# Patient Record
Sex: FEMALE | Race: ASIAN | NOT HISPANIC OR LATINO | URBAN - METROPOLITAN AREA
[De-identification: names, ages, dates, MRNs, and addresses within clinical notes are randomized per-mention and may not be internally consistent; named-entity substitution may affect disease eponyms.]

---

## 2019-02-20 ENCOUNTER — INPATIENT (INPATIENT)
Facility: HOSPITAL | Age: 79
LOS: 1 days | Discharge: HOME | DRG: 871 | End: 2019-02-22
Attending: HOSPITALIST | Admitting: HOSPITALIST
Payer: MEDICARE

## 2019-02-20 VITALS
HEART RATE: 124 BPM | SYSTOLIC BLOOD PRESSURE: 186 MMHG | WEIGHT: 119.93 LBS | RESPIRATION RATE: 16 BRPM | HEIGHT: 61 IN | OXYGEN SATURATION: 98 % | TEMPERATURE: 101 F | DIASTOLIC BLOOD PRESSURE: 96 MMHG

## 2019-02-20 DIAGNOSIS — J11.1 INFLUENZA DUE TO UNIDENTIFIED INFLUENZA VIRUS WITH OTHER RESPIRATORY MANIFESTATIONS: ICD-10-CM

## 2019-02-20 LAB
ALBUMIN SERPL ELPH-MCNC: 3.4 G/DL — LOW (ref 3.5–5)
ALP SERPL-CCNC: 107 U/L — SIGNIFICANT CHANGE UP (ref 40–120)
ALT FLD-CCNC: 26 U/L DA — SIGNIFICANT CHANGE UP (ref 10–60)
ANION GAP SERPL CALC-SCNC: 7 MMOL/L — SIGNIFICANT CHANGE UP (ref 5–17)
APTT BLD: 42.4 SEC — HIGH (ref 27.5–36.3)
AST SERPL-CCNC: 43 U/L — HIGH (ref 10–40)
BASOPHILS # BLD AUTO: 0.02 K/UL — SIGNIFICANT CHANGE UP (ref 0–0.2)
BASOPHILS NFR BLD AUTO: 0.4 % — SIGNIFICANT CHANGE UP (ref 0–2)
BILIRUB SERPL-MCNC: 0.7 MG/DL — SIGNIFICANT CHANGE UP (ref 0.2–1.2)
BUN SERPL-MCNC: 9 MG/DL — SIGNIFICANT CHANGE UP (ref 7–18)
CALCIUM SERPL-MCNC: 8.4 MG/DL — SIGNIFICANT CHANGE UP (ref 8.4–10.5)
CHLORIDE SERPL-SCNC: 98 MMOL/L — SIGNIFICANT CHANGE UP (ref 96–108)
CO2 SERPL-SCNC: 31 MMOL/L — SIGNIFICANT CHANGE UP (ref 22–31)
CREAT SERPL-MCNC: 0.56 MG/DL — SIGNIFICANT CHANGE UP (ref 0.5–1.3)
EOSINOPHIL # BLD AUTO: 0.01 K/UL — SIGNIFICANT CHANGE UP (ref 0–0.5)
EOSINOPHIL NFR BLD AUTO: 0.2 % — SIGNIFICANT CHANGE UP (ref 0–6)
FLU A RESULT: DETECTED
FLU A RESULT: DETECTED
FLUAV AG NPH QL: DETECTED
FLUBV AG NPH QL: SIGNIFICANT CHANGE UP
GLUCOSE SERPL-MCNC: 98 MG/DL — SIGNIFICANT CHANGE UP (ref 70–99)
HCT VFR BLD CALC: 44.8 % — SIGNIFICANT CHANGE UP (ref 34.5–45)
HGB BLD-MCNC: 14 G/DL — SIGNIFICANT CHANGE UP (ref 11.5–15.5)
IMM GRANULOCYTES NFR BLD AUTO: 0.2 % — SIGNIFICANT CHANGE UP (ref 0–1.5)
INR BLD: 1.25 RATIO — HIGH (ref 0.88–1.16)
LACTATE SERPL-SCNC: 0.9 MMOL/L — SIGNIFICANT CHANGE UP (ref 0.7–2)
LYMPHOCYTES # BLD AUTO: 0.41 K/UL — LOW (ref 1–3.3)
LYMPHOCYTES # BLD AUTO: 7.8 % — LOW (ref 13–44)
MCHC RBC-ENTMCNC: 31.3 GM/DL — LOW (ref 32–36)
MCHC RBC-ENTMCNC: 32.3 PG — SIGNIFICANT CHANGE UP (ref 27–34)
MCV RBC AUTO: 103.5 FL — HIGH (ref 80–100)
MONOCYTES # BLD AUTO: 0.69 K/UL — SIGNIFICANT CHANGE UP (ref 0–0.9)
MONOCYTES NFR BLD AUTO: 13.1 % — SIGNIFICANT CHANGE UP (ref 2–14)
NEUTROPHILS # BLD AUTO: 4.13 K/UL — SIGNIFICANT CHANGE UP (ref 1.8–7.4)
NEUTROPHILS NFR BLD AUTO: 78.3 % — HIGH (ref 43–77)
NRBC # BLD: 0 /100 WBCS — SIGNIFICANT CHANGE UP (ref 0–0)
PLATELET # BLD AUTO: 120 K/UL — LOW (ref 150–400)
POTASSIUM SERPL-MCNC: 4.1 MMOL/L — SIGNIFICANT CHANGE UP (ref 3.5–5.3)
POTASSIUM SERPL-SCNC: 4.1 MMOL/L — SIGNIFICANT CHANGE UP (ref 3.5–5.3)
PROT SERPL-MCNC: 7.6 G/DL — SIGNIFICANT CHANGE UP (ref 6–8.3)
PROTHROM AB SERPL-ACNC: 14 SEC — HIGH (ref 10–12.9)
RBC # BLD: 4.33 M/UL — SIGNIFICANT CHANGE UP (ref 3.8–5.2)
RBC # FLD: 12.6 % — SIGNIFICANT CHANGE UP (ref 10.3–14.5)
RSV RESULT: DETECTED
RSV RNA RESP QL NAA+PROBE: DETECTED
SODIUM SERPL-SCNC: 136 MMOL/L — SIGNIFICANT CHANGE UP (ref 135–145)
WBC # BLD: 5.27 K/UL — SIGNIFICANT CHANGE UP (ref 3.8–10.5)
WBC # FLD AUTO: 5.27 K/UL — SIGNIFICANT CHANGE UP (ref 3.8–10.5)

## 2019-02-20 PROCEDURE — 71045 X-RAY EXAM CHEST 1 VIEW: CPT | Mod: 26

## 2019-02-20 PROCEDURE — 99284 EMERGENCY DEPT VISIT MOD MDM: CPT | Mod: 25

## 2019-02-20 RX ORDER — AZITHROMYCIN 500 MG/1
500 TABLET, FILM COATED ORAL ONCE
Qty: 0 | Refills: 0 | Status: COMPLETED | OUTPATIENT
Start: 2019-02-20 | End: 2019-02-20

## 2019-02-20 RX ORDER — ACETAMINOPHEN 500 MG
650 TABLET ORAL ONCE
Qty: 0 | Refills: 0 | Status: COMPLETED | OUTPATIENT
Start: 2019-02-20 | End: 2019-02-20

## 2019-02-20 RX ORDER — CEFTRIAXONE 500 MG/1
1 INJECTION, POWDER, FOR SOLUTION INTRAMUSCULAR; INTRAVENOUS ONCE
Qty: 0 | Refills: 0 | Status: COMPLETED | OUTPATIENT
Start: 2019-02-20 | End: 2019-02-20

## 2019-02-20 RX ORDER — SODIUM CHLORIDE 9 MG/ML
1700 INJECTION INTRAMUSCULAR; INTRAVENOUS; SUBCUTANEOUS ONCE
Qty: 0 | Refills: 0 | Status: COMPLETED | OUTPATIENT
Start: 2019-02-20 | End: 2019-02-20

## 2019-02-20 RX ADMIN — Medication 650 MILLIGRAM(S): at 22:42

## 2019-02-20 RX ADMIN — SODIUM CHLORIDE 1700 MILLILITER(S): 9 INJECTION INTRAMUSCULAR; INTRAVENOUS; SUBCUTANEOUS at 22:23

## 2019-02-20 RX ADMIN — Medication 650 MILLIGRAM(S): at 23:30

## 2019-02-20 NOTE — H&P ADULT - PROBLEM SELECTOR PLAN 3
c/w duoneb, solumedorol q8 hr p/w flu like symtoms body ache, sorethroat cough, runny nose with sick contacts  Labs positive for Flu A infection   started tamifly

## 2019-02-20 NOTE — ED PROVIDER NOTE - PROGRESS NOTE DETAILS
pt febrile, tachycardic.  CXR with questionable right sided consolidation.  pt flu positive. will admit.

## 2019-02-20 NOTE — ED PROVIDER NOTE - OBJECTIVE STATEMENT
79 y/o F with a PMHx of HTN, Asthma and no PSHx presents to ED c/o fever x today & cough x 2 days. Pt febrile at 101.4 & tachy at 124. NKDA

## 2019-02-20 NOTE — H&P ADULT - PROBLEM SELECTOR PLAN 2
p/w flu like symtoms body ache, sorethroat cough, runny nose with sick contacts  Labs positive for Flu A infection   started tamifly p/w fever, dyspnea, tachycardia, temp: 103F, Flu positive likely sepsis secondary to influenza and UTI  s/p rocephin and zithromax  will continue rocephin  F/u blood cultures, urine culture

## 2019-02-20 NOTE — H&P ADULT - ASSESSMENT
79 y/o F with a PMHx of HTN, Asthma ( never intubated) and no PSHx presents to ED c/o shortness of breath  x 1 day

## 2019-02-20 NOTE — H&P ADULT - PROBLEM SELECTOR PLAN 1
p/w fever, dypnea, tachycardia, temp: 103F, Flu positive likely sepsis secondary to influenza and UTI  s/p rocephin and zithromax  will continue rocephin  F p/w fever, dyspnea, tachycardia, temp: 103F, Flu positive likely sepsis secondary to influenza and UTI  s/p rocephin and zithromax  will continue rocephin  F/u blood cultures, urine culture p/w fever, dyspnea, tachycardia, temp: 103F,   RLL infiltrate  c/w RLL CAP  Flu positive likely sepsis secondary to CAP, influenza and suspected UTI  s/p Rocephin and Zithromax

## 2019-02-20 NOTE — H&P ADULT - PROBLEM SELECTOR PLAN 4
p/w symptoms of UTI, burning, increased frequency   F/u rocephin  F/u UA not sent c/w Duoneb, solumedrol q8 hr

## 2019-02-20 NOTE — H&P ADULT - NSHPPHYSICALEXAM_GEN_ALL_CORE
ICU Vital Signs Last 24 Hrs  T(C): 38.6 (20 Feb 2019 20:34), Max: 38.6 (20 Feb 2019 20:34)  T(F): 101.4 (20 Feb 2019 20:34), Max: 101.4 (20 Feb 2019 20:34)  HR: 124 (20 Feb 2019 20:34) (124 - 124)  BP: 186/96 (20 Feb 2019 20:34) (186/96 - 186/96)  BP(mean): --  ABP: --  ABP(mean): --  RR: 16 (20 Feb 2019 20:34) (16 - 16)  SpO2: 98% (20 Feb 2019 20:34) (98% - 98%)

## 2019-02-20 NOTE — H&P ADULT - ATTENDING COMMENTS
This is a 78 year old woman with PMH of HTN, Mild intermittent asthma here in the ED with 2 days of fever/SOB, cough productive of greenish sputum with URI symptoms. She presents to the ED and remains febrile in the ED with tachycardia.  ROS reveals urinary freq with dysuria.    Vital Signs Last 24 Hrs  T(C): 37.4 (21 Feb 2019 01:08), Max: 38.6 (20 Feb 2019 20:34)  T(F): 99.3 (21 Feb 2019 01:08), Max: 101.4 (20 Feb 2019 20:34)  HR: 113 (21 Feb 2019 01:08) (113 - 124)  BP: 136/107 (21 Feb 2019 01:08) (136/107 - 186/96)  RR: 16 (21 Feb 2019 01:08) (16 - 16)  SpO2: 93% (21 Feb 2019 01:08) (93% - 98%)    Elderly woman, acutely ill-looking, alert awake, oriented X 3  MMM, no JVD  Good air entry, scattered upper airway and upper lobe wheezing  RRR S1S2 only  Soft NTND BS +  No pedal edema    Labs                        14.0   5.27  )-----------( 120      ( 20 Feb 2019 21:51 )             44.8     02-20    136  |  98  |  9   ----------------------------<  98  4.1   |  31  |  0.56    Ca    8.4      20 Feb 2019 21:51    TPro  7.6  /  Alb  3.4<L>  /  TBili  0.7  /  DBili  x   /  AST  43<H>  /  ALT  26  /  AlkPhos  107  02-20    lactate -0.9    flu A +  RSV +  CXR ( prelim read by me) early infiltrate in the RLL    Impression  78 year old woman with hx as above with clinical /lab/ radiological presentation c/w a RLL CAP superimposed on a RSV/influenza A URI.    RLL CAP with sepsis  - Admit to medicine  - s/p sepsis code s/p IV hydration  - Empiric antibiotics including atypical bacteria coverage  - Supportive care - O2 / bronchodilators/steroids/  - Mucolytics as needed    Viral URI - flu A + RSV  - Supportive care  - Continue tamiflu   - Resp isolation  - Monitor closely; low threshold for ICU care if resp distress occurs    Suspected UTI   clinical presentation  UA post antibiotics admin  already on appropriate antibiotics

## 2019-02-20 NOTE — H&P ADULT - HISTORY OF PRESENT ILLNESS
79 y/o F with a PMHx of HTN, Asthma and no PSHx presents to ED c/o fever x today & cough x 2 days. Pt febrile at 101.4 & tachy at 124. NKDA 79 y/o F with a PMHx of HTN, Asthma and no PSHx presents to ED c/o shortness of breath  x 1 day    Patient states she developed sudden shortness of breath with wheezing in morning, she used her inhaler but she did not get any relief so she came to ED. Patient states  & cough x 2 days. Pt febrile at 101.4 & tachy at 124. NKDA 79 y/o F with a PMHx of HTN, Asthma and no PSHx presents to ED c/o shortness of breath  x 1 day    Patient states she developed sudden shortness of breath with wheezing in morning, she used her inhaler but she did not get any relief so she came to ED. Her dyspnea is associated with productive cough which was green in color, high grade fevers, chills, bodyache, sorethroat, runny nose since 2 days. She reports her Patient states  & cough x 2 days. Pt febrile at 101.4 & tachy at 124. NKDA 79 y/o F with a PMHx of HTN, Asthma ( never intubated) and no PSHx presents to ED c/o shortness of breath  x 1 day    Patient states she developed sudden shortness of breath with wheezing in morning, used her inhaler but did not get any relief so she came to ED. Her dyspnea is associated with productive cough which was green in color, high grade fevers, chills, dry mouth, body ache, sore throat, runny nose since 2 days. . She also reports of having dysuria, increased urinary frequency  and lower abdominal pain since 2 days. She denies headache, diarrhea, constipation nausea, vomiting or any other complains. She reports her grand daughters are sick and have similar symptoms of cold.    In ED, patient's vitals signs were remarkable for Temp: 101.4, HR:124, BP: 186/94. Labs were grossly willian but Flu A positive, UA pending    Goals of care: Full code    Of note, patient does not know her medications. Primary team please confirm medications.

## 2019-02-20 NOTE — H&P ADULT - PROBLEM SELECTOR PLAN 5
RISK                                                          Points  [  ] Previous VTE                                                3  [  ] Thrombophilia                                             2  [  ] Lower limb paralysis                                   2        (unable to hold up >15 seconds)    [  ] Current Cancer                                             2         (within 6 months)  [ x ] Immobilization > 24 hrs                              1  [  ] ICU/CCU stay > 24 hours                             1  [ x ] Age > 60                                                         1    IMPROVE VTE Score: 2  lovenox for VTE prophylaxis. p/w symptoms of UTI, burning, increased frequency   F/u rocephin  F/u UA not sent

## 2019-02-21 DIAGNOSIS — J11.1 INFLUENZA DUE TO UNIDENTIFIED INFLUENZA VIRUS WITH OTHER RESPIRATORY MANIFESTATIONS: ICD-10-CM

## 2019-02-21 DIAGNOSIS — N39.0 URINARY TRACT INFECTION, SITE NOT SPECIFIED: ICD-10-CM

## 2019-02-21 DIAGNOSIS — Z29.9 ENCOUNTER FOR PROPHYLACTIC MEASURES, UNSPECIFIED: ICD-10-CM

## 2019-02-21 DIAGNOSIS — J45.901 UNSPECIFIED ASTHMA WITH (ACUTE) EXACERBATION: ICD-10-CM

## 2019-02-21 DIAGNOSIS — A41.9 SEPSIS, UNSPECIFIED ORGANISM: ICD-10-CM

## 2019-02-21 DIAGNOSIS — J18.9 PNEUMONIA, UNSPECIFIED ORGANISM: ICD-10-CM

## 2019-02-21 LAB
ALBUMIN SERPL ELPH-MCNC: 3 G/DL — LOW (ref 3.5–5)
ALP SERPL-CCNC: 90 U/L — SIGNIFICANT CHANGE UP (ref 40–120)
ALT FLD-CCNC: 32 U/L DA — SIGNIFICANT CHANGE UP (ref 10–60)
ANION GAP SERPL CALC-SCNC: 5 MMOL/L — SIGNIFICANT CHANGE UP (ref 5–17)
APPEARANCE UR: CLEAR — SIGNIFICANT CHANGE UP
AST SERPL-CCNC: 48 U/L — HIGH (ref 10–40)
BASOPHILS # BLD AUTO: 0.01 K/UL — SIGNIFICANT CHANGE UP (ref 0–0.2)
BASOPHILS NFR BLD AUTO: 0.2 % — SIGNIFICANT CHANGE UP (ref 0–2)
BILIRUB SERPL-MCNC: 0.7 MG/DL — SIGNIFICANT CHANGE UP (ref 0.2–1.2)
BILIRUB UR-MCNC: NEGATIVE — SIGNIFICANT CHANGE UP
BUN SERPL-MCNC: 9 MG/DL — SIGNIFICANT CHANGE UP (ref 7–18)
CALCIUM SERPL-MCNC: 8 MG/DL — LOW (ref 8.4–10.5)
CHLORIDE SERPL-SCNC: 98 MMOL/L — SIGNIFICANT CHANGE UP (ref 96–108)
CHOLEST SERPL-MCNC: 140 MG/DL — SIGNIFICANT CHANGE UP (ref 10–199)
CO2 SERPL-SCNC: 32 MMOL/L — HIGH (ref 22–31)
COLOR SPEC: YELLOW — SIGNIFICANT CHANGE UP
CREAT SERPL-MCNC: 0.54 MG/DL — SIGNIFICANT CHANGE UP (ref 0.5–1.3)
DIFF PNL FLD: ABNORMAL
EOSINOPHIL # BLD AUTO: 0 K/UL — SIGNIFICANT CHANGE UP (ref 0–0.5)
EOSINOPHIL NFR BLD AUTO: 0 % — SIGNIFICANT CHANGE UP (ref 0–6)
FOLATE SERPL-MCNC: 13.9 NG/ML — SIGNIFICANT CHANGE UP
GLUCOSE SERPL-MCNC: 98 MG/DL — SIGNIFICANT CHANGE UP (ref 70–99)
GLUCOSE UR QL: NEGATIVE — SIGNIFICANT CHANGE UP
HBA1C BLD-MCNC: 5.6 % — SIGNIFICANT CHANGE UP (ref 4–5.6)
HCT VFR BLD CALC: 44.5 % — SIGNIFICANT CHANGE UP (ref 34.5–45)
HDLC SERPL-MCNC: 62 MG/DL — SIGNIFICANT CHANGE UP
HGB BLD-MCNC: 13.8 G/DL — SIGNIFICANT CHANGE UP (ref 11.5–15.5)
IMM GRANULOCYTES NFR BLD AUTO: 0.2 % — SIGNIFICANT CHANGE UP (ref 0–1.5)
KETONES UR-MCNC: ABNORMAL
LEUKOCYTE ESTERASE UR-ACNC: NEGATIVE — SIGNIFICANT CHANGE UP
LIPID PNL WITH DIRECT LDL SERPL: 69 MG/DL — SIGNIFICANT CHANGE UP
LYMPHOCYTES # BLD AUTO: 0.74 K/UL — LOW (ref 1–3.3)
LYMPHOCYTES # BLD AUTO: 16.4 % — SIGNIFICANT CHANGE UP (ref 13–44)
MAGNESIUM SERPL-MCNC: 1.8 MG/DL — SIGNIFICANT CHANGE UP (ref 1.6–2.6)
MCHC RBC-ENTMCNC: 31 GM/DL — LOW (ref 32–36)
MCHC RBC-ENTMCNC: 32.3 PG — SIGNIFICANT CHANGE UP (ref 27–34)
MCV RBC AUTO: 104.2 FL — HIGH (ref 80–100)
MONOCYTES # BLD AUTO: 0.82 K/UL — SIGNIFICANT CHANGE UP (ref 0–0.9)
MONOCYTES NFR BLD AUTO: 18.2 % — HIGH (ref 2–14)
NEUTROPHILS # BLD AUTO: 2.92 K/UL — SIGNIFICANT CHANGE UP (ref 1.8–7.4)
NEUTROPHILS NFR BLD AUTO: 65 % — SIGNIFICANT CHANGE UP (ref 43–77)
NITRITE UR-MCNC: NEGATIVE — SIGNIFICANT CHANGE UP
NRBC # BLD: 0 /100 WBCS — SIGNIFICANT CHANGE UP (ref 0–0)
PH UR: 7 — SIGNIFICANT CHANGE UP (ref 5–8)
PHOSPHATE SERPL-MCNC: 4.2 MG/DL — SIGNIFICANT CHANGE UP (ref 2.5–4.5)
PLATELET # BLD AUTO: 105 K/UL — LOW (ref 150–400)
POTASSIUM SERPL-MCNC: 3.6 MMOL/L — SIGNIFICANT CHANGE UP (ref 3.5–5.3)
POTASSIUM SERPL-SCNC: 3.6 MMOL/L — SIGNIFICANT CHANGE UP (ref 3.5–5.3)
PROT SERPL-MCNC: 6.7 G/DL — SIGNIFICANT CHANGE UP (ref 6–8.3)
PROT UR-MCNC: NEGATIVE — SIGNIFICANT CHANGE UP
RBC # BLD: 4.27 M/UL — SIGNIFICANT CHANGE UP (ref 3.8–5.2)
RBC # FLD: 12.7 % — SIGNIFICANT CHANGE UP (ref 10.3–14.5)
SODIUM SERPL-SCNC: 135 MMOL/L — SIGNIFICANT CHANGE UP (ref 135–145)
SP GR SPEC: 1.01 — SIGNIFICANT CHANGE UP (ref 1.01–1.02)
TOTAL CHOLESTEROL/HDL RATIO MEASUREMENT: 2.3 RATIO — LOW (ref 3.3–7.1)
TRIGL SERPL-MCNC: 46 MG/DL — SIGNIFICANT CHANGE UP (ref 10–149)
TSH SERPL-MCNC: 1.08 UU/ML — SIGNIFICANT CHANGE UP (ref 0.34–4.82)
UROBILINOGEN FLD QL: NEGATIVE — SIGNIFICANT CHANGE UP
VIT B12 SERPL-MCNC: 679 PG/ML — SIGNIFICANT CHANGE UP (ref 232–1245)
WBC # BLD: 4.5 K/UL — SIGNIFICANT CHANGE UP (ref 3.8–10.5)
WBC # FLD AUTO: 4.5 K/UL — SIGNIFICANT CHANGE UP (ref 3.8–10.5)

## 2019-02-21 PROCEDURE — 99222 1ST HOSP IP/OBS MODERATE 55: CPT

## 2019-02-21 RX ORDER — PANTOPRAZOLE SODIUM 20 MG/1
40 TABLET, DELAYED RELEASE ORAL
Qty: 0 | Refills: 0 | Status: DISCONTINUED | OUTPATIENT
Start: 2019-02-21 | End: 2019-02-22

## 2019-02-21 RX ORDER — IPRATROPIUM/ALBUTEROL SULFATE 18-103MCG
3 AEROSOL WITH ADAPTER (GRAM) INHALATION EVERY 6 HOURS
Qty: 0 | Refills: 0 | Status: DISCONTINUED | OUTPATIENT
Start: 2019-02-21 | End: 2019-02-22

## 2019-02-21 RX ORDER — CEFTRIAXONE 500 MG/1
1 INJECTION, POWDER, FOR SOLUTION INTRAMUSCULAR; INTRAVENOUS EVERY 24 HOURS
Qty: 0 | Refills: 0 | Status: DISCONTINUED | OUTPATIENT
Start: 2019-02-22 | End: 2019-02-22

## 2019-02-21 RX ORDER — ATENOLOL 25 MG/1
50 TABLET ORAL DAILY
Qty: 0 | Refills: 0 | Status: DISCONTINUED | OUTPATIENT
Start: 2019-02-21 | End: 2019-02-22

## 2019-02-21 RX ORDER — SODIUM CHLORIDE 9 MG/ML
1000 INJECTION INTRAMUSCULAR; INTRAVENOUS; SUBCUTANEOUS
Qty: 0 | Refills: 0 | Status: DISCONTINUED | OUTPATIENT
Start: 2019-02-21 | End: 2019-02-21

## 2019-02-21 RX ORDER — ENOXAPARIN SODIUM 100 MG/ML
40 INJECTION SUBCUTANEOUS DAILY
Qty: 0 | Refills: 0 | Status: DISCONTINUED | OUTPATIENT
Start: 2019-02-21 | End: 2019-02-22

## 2019-02-21 RX ORDER — ACETAMINOPHEN 500 MG
650 TABLET ORAL EVERY 6 HOURS
Qty: 0 | Refills: 0 | Status: DISCONTINUED | OUTPATIENT
Start: 2019-02-21 | End: 2019-02-22

## 2019-02-21 RX ADMIN — Medication 3 MILLILITER(S): at 15:02

## 2019-02-21 RX ADMIN — Medication 75 MILLIGRAM(S): at 17:05

## 2019-02-21 RX ADMIN — PANTOPRAZOLE SODIUM 40 MILLIGRAM(S): 20 TABLET, DELAYED RELEASE ORAL at 05:40

## 2019-02-21 RX ADMIN — Medication 3 MILLILITER(S): at 20:09

## 2019-02-21 RX ADMIN — Medication 40 MILLIGRAM(S): at 05:40

## 2019-02-21 RX ADMIN — Medication 40 MILLIGRAM(S): at 23:08

## 2019-02-21 RX ADMIN — Medication 75 MILLIGRAM(S): at 05:40

## 2019-02-21 RX ADMIN — SODIUM CHLORIDE 75 MILLILITER(S): 9 INJECTION INTRAMUSCULAR; INTRAVENOUS; SUBCUTANEOUS at 08:58

## 2019-02-21 RX ADMIN — ATENOLOL 50 MILLIGRAM(S): 25 TABLET ORAL at 05:40

## 2019-02-21 RX ADMIN — ENOXAPARIN SODIUM 40 MILLIGRAM(S): 100 INJECTION SUBCUTANEOUS at 11:48

## 2019-02-21 RX ADMIN — Medication 3 MILLILITER(S): at 08:56

## 2019-02-21 RX ADMIN — Medication 40 MILLIGRAM(S): at 16:02

## 2019-02-21 NOTE — PROGRESS NOTE ADULT - SUBJECTIVE AND OBJECTIVE BOX
MEDICAL ATTENDING NOTE    Patient is a 78y old  Female who presents with a chief complaint of shortness of breath (2019 23:34)      INTERVAL HPI/OVERNIGHT EVENTS: still with cough otherwise no new complaints    MEDICATIONS  (STANDING):  ALBUTerol/ipratropium for Nebulization 3 milliLiter(s) Nebulizer every 6 hours  ATENolol  Tablet 50 milliGRAM(s) Oral daily  enoxaparin Injectable 40 milliGRAM(s) SubCutaneous daily  methylPREDNISolone sodium succinate Injectable 40 milliGRAM(s) IV Push every 8 hours  oseltamivir 75 milliGRAM(s) Oral two times a day  pantoprazole    Tablet 40 milliGRAM(s) Oral before breakfast  sodium chloride 0.9%. 1000 milliLiter(s) (75 mL/Hr) IV Continuous <Continuous>    MEDICATIONS  (PRN):  acetaminophen   Tablet .. 650 milliGRAM(s) Oral every 6 hours PRN Temp greater or equal to 38C (100.4F), Mild Pain (1 - 3), Moderate Pain (4 - 6)      __________________________________________________  ----------------------------------------------------------------------------------  REVIEW OF SYSTEMS: no fever, no SOB, No Chest pain; feels ok      Vital Signs Last 24 Hrs  T(C): 37.3 (2019 16:21), Max: 38.6 (2019 20:34)  T(F): 99.1 (2019 16:21), Max: 101.4 (2019 20:34)  HR: 80 (2019 16:21) (79 - 124)  BP: 130/64 (2019 16:21) (124/56 - 186/96)  BP(mean): --  RR: 18 (2019 16:21) (16 - 18)  SpO2: 99% (2019 16:21) (93% - 100%)    _________________  PHYSICAL EXAM:  ---------------------------   NAD; Normocephalic   LUNGS - no wheezing; decreased bibasilar air entry  HEART: S1 S2+   ABDOMEN: Soft, Nontender, non distended; BS+  EXTREMITIES: no cyanosis; no edema  NERVOUS SYSTEM:  Awake and alert; no new deficits    _________________________________________________  LABS:                        13.8   4.50  )-----------( 105      ( 2019 07:03 )             44.5     -    135  |  98  |  9   ----------------------------<  98  3.6   |  32<H>  |  0.54    Ca    8.0<L>      2019 07:03  Phos  4.2       Mg     1.8         TPro  6.7  /  Alb  3.0<L>  /  TBili  0.7  /  DBili  x   /  AST  48<H>  /  ALT  32  /  AlkPhos  90  02-21    PT/INR - ( 2019 23:48 )   PT: 14.0 sec;   INR: 1.25 ratio         PTT - ( 2019 23:48 )  PTT:42.4 sec  Urinalysis Basic - ( 2019 01:14 )    Color: Yellow / Appearance: Clear / S.010 / pH: x  Gluc: x / Ketone: Moderate  / Bili: Negative / Urobili: Negative   Blood: x / Protein: Negative / Nitrite: Negative   Leuk Esterase: Negative / RBC: 2-5 /HPF / WBC 0-2 /HPF   Sq Epi: x / Non Sq Epi: x / Bacteria: x      CAPILLARY BLOOD GLUCOSE                Care Discussed with Consultants :     Plan of care was discussed with patient ; all questions and concerns were addressed and care was aligned with patient's wishes.

## 2019-02-21 NOTE — PROGRESS NOTE ADULT - PROBLEM SELECTOR PLAN 1
Bacterial PNA superimposed on viral infection with sepsis on admission   - Continue antibiotics  Add Robitussin for cough

## 2019-02-21 NOTE — ED ADULT NURSE NOTE - NSIMPLEMENTINTERV_GEN_ALL_ED
Implemented All Universal Safety Interventions:  [place] to call system. Call bell, personal items and telephone within reach. Instruct patient to call for assistance. Room bathroom lighting operational. Non-slip footwear when patient is off stretcher. Physically safe environment: no spills, clutter or unnecessary equipment. Stretcher in lowest position, wheels locked, appropriate side rails in place.

## 2019-02-21 NOTE — ED ADULT NURSE REASSESSMENT NOTE - NS ED NURSE REASSESS COMMENT FT1
Patient received from TOSHIA Camarena . Patient is hemodynamically stable and In no acute distress. On droplet precautions for Influenza A.

## 2019-02-21 NOTE — PROGRESS NOTE ADULT - PROBLEM SELECTOR PLAN 2
Continue Tamiflu  Patient did not receive Influenza vaccine  Maintain Isolation precautions based on infection control protocol

## 2019-02-21 NOTE — ED ADULT NURSE NOTE - ED STAT RN HANDOFF DETAILS
Patient endorsed to TOSHIA Olea in stable condition and in no acute distress. On droplet precautions for Influenza.

## 2019-02-22 ENCOUNTER — TRANSCRIPTION ENCOUNTER (OUTPATIENT)
Age: 79
End: 2019-02-22

## 2019-02-22 VITALS
OXYGEN SATURATION: 99 % | SYSTOLIC BLOOD PRESSURE: 133 MMHG | RESPIRATION RATE: 17 BRPM | DIASTOLIC BLOOD PRESSURE: 65 MMHG | TEMPERATURE: 98 F | HEART RATE: 81 BPM

## 2019-02-22 LAB
CULTURE RESULTS: SIGNIFICANT CHANGE UP
SPECIMEN SOURCE: SIGNIFICANT CHANGE UP

## 2019-02-22 PROCEDURE — 85730 THROMBOPLASTIN TIME PARTIAL: CPT

## 2019-02-22 PROCEDURE — 87086 URINE CULTURE/COLONY COUNT: CPT

## 2019-02-22 PROCEDURE — 83735 ASSAY OF MAGNESIUM: CPT

## 2019-02-22 PROCEDURE — 82607 VITAMIN B-12: CPT

## 2019-02-22 PROCEDURE — 99239 HOSP IP/OBS DSCHRG MGMT >30: CPT

## 2019-02-22 PROCEDURE — 81001 URINALYSIS AUTO W/SCOPE: CPT

## 2019-02-22 PROCEDURE — 83605 ASSAY OF LACTIC ACID: CPT

## 2019-02-22 PROCEDURE — 87631 RESP VIRUS 3-5 TARGETS: CPT

## 2019-02-22 PROCEDURE — 84100 ASSAY OF PHOSPHORUS: CPT

## 2019-02-22 PROCEDURE — 94640 AIRWAY INHALATION TREATMENT: CPT

## 2019-02-22 PROCEDURE — 84443 ASSAY THYROID STIM HORMONE: CPT

## 2019-02-22 PROCEDURE — 80053 COMPREHEN METABOLIC PANEL: CPT

## 2019-02-22 PROCEDURE — 85027 COMPLETE CBC AUTOMATED: CPT

## 2019-02-22 PROCEDURE — 85610 PROTHROMBIN TIME: CPT

## 2019-02-22 PROCEDURE — 36415 COLL VENOUS BLD VENIPUNCTURE: CPT

## 2019-02-22 PROCEDURE — 82746 ASSAY OF FOLIC ACID SERUM: CPT

## 2019-02-22 PROCEDURE — 71045 X-RAY EXAM CHEST 1 VIEW: CPT

## 2019-02-22 PROCEDURE — 83036 HEMOGLOBIN GLYCOSYLATED A1C: CPT

## 2019-02-22 PROCEDURE — 93005 ELECTROCARDIOGRAM TRACING: CPT

## 2019-02-22 PROCEDURE — 87040 BLOOD CULTURE FOR BACTERIA: CPT

## 2019-02-22 PROCEDURE — 99285 EMERGENCY DEPT VISIT HI MDM: CPT | Mod: 25

## 2019-02-22 PROCEDURE — 80061 LIPID PANEL: CPT

## 2019-02-22 RX ORDER — AZITHROMYCIN 500 MG/1
500 TABLET, FILM COATED ORAL EVERY 24 HOURS
Qty: 0 | Refills: 0 | Status: DISCONTINUED | OUTPATIENT
Start: 2019-02-22 | End: 2019-02-22

## 2019-02-22 RX ORDER — AZITHROMYCIN 500 MG/1
1 TABLET, FILM COATED ORAL
Qty: 5 | Refills: 0
Start: 2019-02-22 | End: 2019-02-26

## 2019-02-22 RX ORDER — HYDRALAZINE HCL 50 MG
10 TABLET ORAL ONCE
Qty: 0 | Refills: 0 | Status: DISCONTINUED | OUTPATIENT
Start: 2019-02-22 | End: 2019-02-22

## 2019-02-22 RX ADMIN — ATENOLOL 50 MILLIGRAM(S): 25 TABLET ORAL at 06:09

## 2019-02-22 RX ADMIN — CEFTRIAXONE 100 GRAM(S): 500 INJECTION, POWDER, FOR SOLUTION INTRAMUSCULAR; INTRAVENOUS at 06:09

## 2019-02-22 RX ADMIN — Medication 3 MILLILITER(S): at 08:49

## 2019-02-22 RX ADMIN — ENOXAPARIN SODIUM 40 MILLIGRAM(S): 100 INJECTION SUBCUTANEOUS at 11:14

## 2019-02-22 RX ADMIN — Medication 3 MILLILITER(S): at 04:26

## 2019-02-22 RX ADMIN — AZITHROMYCIN 250 MILLIGRAM(S): 500 TABLET, FILM COATED ORAL at 11:13

## 2019-02-22 RX ADMIN — PANTOPRAZOLE SODIUM 40 MILLIGRAM(S): 20 TABLET, DELAYED RELEASE ORAL at 06:09

## 2019-02-22 RX ADMIN — Medication 75 MILLIGRAM(S): at 06:09

## 2019-02-22 NOTE — PROGRESS NOTE ADULT - ASSESSMENT
Assessment	  79 y/o F with a PMHx of HTN, Asthma ( never intubated) and no PSHx presents to ED c/o shortness of breath  x 1 day     Problem/Plan - 1:  ·  Problem: CAP (community acquired pneumonia).  Plan: Bacterial PNA superimposed on viral infection with sepsis on admission   - Continue antibiotics  Add Robitussin for cough.      Problem/Plan - 2:  ·  Problem: Influenza.  Plan: Continue Tamiflu  Patient did not receive Influenza vaccine  Maintain Isolation precautions based on infection control protocol.      Problem/Plan - 3:  ·  Problem: Asthma with exacerbation.  Plan: Continue steroids  Continue bronchodilators.      Problem/Plan - 4:  ·  Problem: UTI (urinary tract infection).  Plan: continue antibiotics  Follow up cultures.      Problem/Plan - 5:  ·  Problem: Prophylactic measure.  Plan: continue Lovenox for VTE prophylaxis.     Dispo:  Pt is clinically improved. Will dc home today to complete azithromycin.

## 2019-02-22 NOTE — PROGRESS NOTE ADULT - SUBJECTIVE AND OBJECTIVE BOX
Patient is a 78y old  Female who presents with a chief complaint of shortness of breath (2019 14:21)    HPI:  77 y/o F with a PMHx of HTN, Asthma ( never intubated) and no PSHx presents to ED c/o shortness of breath  x 1 day  Patient states she developed sudden shortness of breath with wheezing in morning, used her inhaler but did not get any relief so she came to ED. Her dyspnea is associated with productive cough which was green in color, high grade fevers, chills, dry mouth, body ache, sore throat, runny nose since 2 days. . She also reports of having dysuria, increased urinary frequency  and lower abdominal pain since 2 days. She denies headache, diarrhea, constipation nausea, vomiting or any other complains. She reports her grand daughters are sick and have similar symptoms of cold.  In ED, patient's vitals signs were remarkable for Temp: 101.4, HR:124, BP: 186/94. Labs were grossly willian but Flu A positive, UA pending    Today:   Pt states she feels better. Pt denies any cough, sob, weakness, chest pain.      PAST MEDICAL & SURGICAL HISTORY:  Essential hypertension  Asthma with acute exacerbation, unspecified asthma severity  No significant past surgical history    MEDICATIONS  (STANDING):  ALBUTerol/ipratropium for Nebulization 3 milliLiter(s) Nebulizer every 6 hours  ATENolol  Tablet 50 milliGRAM(s) Oral daily  azithromycin  IVPB 500 milliGRAM(s) IV Intermittent every 24 hours  cefTRIAXone   IVPB 1 Gram(s) IV Intermittent every 24 hours  enoxaparin Injectable 40 milliGRAM(s) SubCutaneous daily  oseltamivir 75 milliGRAM(s) Oral two times a day  pantoprazole    Tablet 40 milliGRAM(s) Oral before breakfast    MEDICATIONS  (PRN):  acetaminophen   Tablet .. 650 milliGRAM(s) Oral every 6 hours PRN Temp greater or equal to 38C (100.4F), Mild Pain (1 - 3), Moderate Pain (4 - 6)    EXAM:  Vital Signs Last 24 Hrs  T(C): 36.7 (2019 07:46), Max: 37.4 (2019 23:51)  T(F): 98.1 (2019 07:46), Max: 99.4 (2019 02:14)  HR: 81 (2019 07:46) (76 - 87)  BP: 133/65 (2019 07:46) (130/64 - 158/90)  BP(mean): --  RR: 17 (2019 07:46) (16 - 18)  SpO2: 99% (2019 07:46) (86% - 99%)    PHYSICAL EXAM:  Constitutional: awake in bed.   ENMT: patent nares, moist mucus membranes.  Neck: supple  Respiratory: bilaterally clear to auscultation, no wheezing, no rhonchi, no crackles, no decreased air entry  Cardiovascular: s1s2, rrr, no murmurs.   Gastrointestinal: soft, non tender, +bowel sounds, no rebound, no guarding.   Extremities: no edema.   Skin: intact no rash, warm to touch.   Musculoskeletal: moves all 4 extremities  Psychiatric: no homicidal, suicidal ideation. appropriate affect.     LABS:  Urinalysis Basic - ( 2019 01:14 )  Color: Yellow / Appearance: Clear / S.010 / pH: x  Gluc: x / Ketone: Moderate  / Bili: Negative / Urobili: Negative   Blood: x / Protein: Negative / Nitrite: Negative   Leuk Esterase: Negative / RBC: 2-5 /HPF / WBC 0-2 /HPF   Sq Epi: x / Non Sq Epi: x / Bacteria: x    PT/INR - ( 2019 23:48 )   PT: 14.0 sec;   INR: 1.25 ratio    PTT - ( 2019 23:48 )  PTT:42.4 sec  LIVER FUNCTIONS - ( 2019 07:03 )  Alb: 3.0 g/dL / Pro: 6.7 g/dL / ALK PHOS: 90 U/L / ALT: 32 U/L DA / AST: 48 U/L / GGT: x                             13.8   4.50  )-----------( 105      ( 2019 07:03 )             44.5   02-  135  |  98  |  9   ----------------------------<  98  3.6   |  32<H>  |  0.54  Ca    8.0<L>      2019 07:03  Phos  4.2     02-  Mg     1.8     02-21  TPro  6.7  /  Alb  3.0<L>  /  TBili  0.7  /  DBili  x   /  AST  48<H>  /  ALT  32  /  AlkPhos  90      Chart reviewed.   Labs reviewed.  Imaging reviewed.   Plan discussed with consultants.

## 2019-02-22 NOTE — DISCHARGE NOTE ADULT - HOSPITAL COURSE
79 y/o F with a PMHx of HTN, Asthma ( never intubated) and no PSHx presents to ED c/o shortness of breath. Patients symptoms greatly improved today.     For CAP (community acquired pneumonia) bacterial PNA superimposed on viral infection with sepsis on admission was suspected. Patient was given Rocephin, Azithromycin, and Tamiflu. Patient will continue course of azithromycin and Tamiflu at home. Isolation precautions based on infection control protocol were followed.    For asthma with exacerbation patient was given a course of steroids and continued bronchodilators as needed.   For suspected UTI (urinary tract infection) urine culture was negative.    Patient medically stable for discharge back to home.

## 2019-02-22 NOTE — DISCHARGE NOTE ADULT - PLAN OF CARE
Resolve pneumonia When you came to the hospital you had shortness of breath due suspected pneumonia. You were given a course of antibiotics and your symptoms greatly improved. You will  your medication from the pharmacy you provided and continue taking your home medication. You will follow up with your primary care physician. When you came to the hospital aminata tested positive for the flu. You were given antibiotics and Tamiflu while you were here. You will continue taking the Tamiflu and Antibiotics as prescribed. You will follow up with your Primary care physician. You will continue with your bronchodilators at home. You will follow up with your Primary care physician. You will continue with your prescribed medications at home. You will follow up with your Primary care physician.

## 2019-02-22 NOTE — DISCHARGE NOTE ADULT - CARE PLAN
Principal Discharge DX:	CAP (community acquired pneumonia)  Secondary Diagnosis:	Influenza  Secondary Diagnosis:	Asthma with exacerbation  Secondary Diagnosis:	Essential hypertension Principal Discharge DX:	CAP (community acquired pneumonia)  Goal:	Resolve pneumonia  Assessment and plan of treatment:	When you came to the hospital you had shortness of breath due suspected pneumonia. You were given a course of antibiotics and your symptoms greatly improved. You will  your medication from the pharmacy you provided and continue taking your home medication. You will follow up with your primary care physician.  Secondary Diagnosis:	Influenza  Assessment and plan of treatment:	When you came to the hospital yoou tested positive for the flu. You were given antibiotics and Tamiflu while you were here. You will continue taking the Tamiflu and Antibiotics as prescribed. You will follow up with your Primary care physician.  Secondary Diagnosis:	Asthma with exacerbation  Assessment and plan of treatment:	You will continue with your bronchodilators at home. You will follow up with your Primary care physician.  Secondary Diagnosis:	Essential hypertension  Assessment and plan of treatment:	You will continue with your prescribed medications at home. You will follow up with your Primary care physician.

## 2019-02-22 NOTE — DISCHARGE NOTE ADULT - MEDICATION SUMMARY - MEDICATIONS TO TAKE
I will START or STAY ON the medications listed below when I get home from the hospital:    valsartan 160 mg oral capsule  -- 160 milligram(s) by mouth once a day  -- Indication: For Essential hypertension    oseltamivir 75 mg oral capsule  -- 1 cap(s) by mouth 2 times a day  -- Indication: For Flu    atenolol 50 mg oral tablet  -- 1 tab(s) by mouth once a day  -- Indication: For Essential hypertension    albuterol 90 mcg/inh inhalation aerosol  --  inhaled every 6 hours, As Needed  -- Indication: For Asthma with exacerbation    Advair Diskus 250 mcg-50 mcg inhalation powder  -- 1 puff(s) inhaled 2 times a day  -- Indication: For Asthma with exacerbation    ProAir HFA 90 mcg/inh inhalation aerosol  -- 2 puff(s) inhaled 4 times a day, As Needed  -- Indication: For Asthma with exacerbation    azithromycin 250 mg oral tablet  -- 1 cap(s) by mouth once a day   -- Do not take dairy products, antacids, or iron preparations within one hour of this medication.  Finish all this medication unless otherwise directed by prescriber.    -- Indication: For flu    Protonix 40 mg oral delayed release tablet  -- 1 tab(s) by mouth once a day (before a meal)  -- Indication: For Prophylactic measure

## 2019-02-22 NOTE — DISCHARGE NOTE ADULT - PATIENT PORTAL LINK FT
You can access the [facility], offered by [facility], by registering with the following website: [email or web address]

## 2019-02-25 PROBLEM — Z00.00 ENCOUNTER FOR PREVENTIVE HEALTH EXAMINATION: Status: ACTIVE | Noted: 2019-02-25

## 2019-02-26 LAB
CULTURE RESULTS: SIGNIFICANT CHANGE UP
CULTURE RESULTS: SIGNIFICANT CHANGE UP
SPECIMEN SOURCE: SIGNIFICANT CHANGE UP
SPECIMEN SOURCE: SIGNIFICANT CHANGE UP

## 2023-06-01 ENCOUNTER — INPATIENT (INPATIENT)
Facility: HOSPITAL | Age: 83
LOS: 10 days | Discharge: NURSING, REHAB OR LONG-TERM CARE | DRG: 871 | End: 2023-06-12
Attending: INTERNAL MEDICINE | Admitting: INTERNAL MEDICINE
Payer: MEDICARE

## 2023-06-01 VITALS
HEART RATE: 86 BPM | SYSTOLIC BLOOD PRESSURE: 99 MMHG | RESPIRATION RATE: 20 BRPM | WEIGHT: 139.99 LBS | TEMPERATURE: 99 F | DIASTOLIC BLOOD PRESSURE: 47 MMHG | OXYGEN SATURATION: 95 %

## 2023-06-01 DIAGNOSIS — R09.89 OTHER SPECIFIED SYMPTOMS AND SIGNS INVOLVING THE CIRCULATORY AND RESPIRATORY SYSTEMS: ICD-10-CM

## 2023-06-01 DIAGNOSIS — Z98.890 OTHER SPECIFIED POSTPROCEDURAL STATES: Chronic | ICD-10-CM

## 2023-06-01 DIAGNOSIS — E87.5 HYPERKALEMIA: ICD-10-CM

## 2023-06-01 LAB
ALBUMIN SERPL ELPH-MCNC: 2.5 G/DL — LOW (ref 3.5–5)
ALP SERPL-CCNC: 177 U/L — HIGH (ref 40–120)
ALT FLD-CCNC: 11 U/L DA — SIGNIFICANT CHANGE UP (ref 10–60)
ANION GAP SERPL CALC-SCNC: 7 MMOL/L — SIGNIFICANT CHANGE UP (ref 5–17)
ANION GAP SERPL CALC-SCNC: 9 MMOL/L — SIGNIFICANT CHANGE UP (ref 5–17)
APPEARANCE UR: ABNORMAL
APTT BLD: 30.9 SEC — SIGNIFICANT CHANGE UP (ref 27.5–35.5)
AST SERPL-CCNC: 38 U/L — SIGNIFICANT CHANGE UP (ref 10–40)
BACTERIA # UR AUTO: ABNORMAL /HPF
BASOPHILS # BLD AUTO: 0.07 K/UL — SIGNIFICANT CHANGE UP (ref 0–0.2)
BASOPHILS NFR BLD AUTO: 0.4 % — SIGNIFICANT CHANGE UP (ref 0–2)
BILIRUB SERPL-MCNC: 0.5 MG/DL — SIGNIFICANT CHANGE UP (ref 0.2–1.2)
BILIRUB UR-MCNC: NEGATIVE — SIGNIFICANT CHANGE UP
BUN SERPL-MCNC: 163 MG/DL — HIGH (ref 7–18)
BUN SERPL-MCNC: 173 MG/DL — HIGH (ref 7–18)
CALCIUM SERPL-MCNC: 7.8 MG/DL — LOW (ref 8.4–10.5)
CALCIUM SERPL-MCNC: 8.6 MG/DL — SIGNIFICANT CHANGE UP (ref 8.4–10.5)
CHLORIDE SERPL-SCNC: 103 MMOL/L — SIGNIFICANT CHANGE UP (ref 96–108)
CHLORIDE SERPL-SCNC: 97 MMOL/L — SIGNIFICANT CHANGE UP (ref 96–108)
CO2 SERPL-SCNC: 19 MMOL/L — LOW (ref 22–31)
CO2 SERPL-SCNC: 19 MMOL/L — LOW (ref 22–31)
COLOR SPEC: YELLOW — SIGNIFICANT CHANGE UP
CREAT SERPL-MCNC: 6.76 MG/DL — HIGH (ref 0.5–1.3)
CREAT SERPL-MCNC: 7.86 MG/DL — HIGH (ref 0.5–1.3)
D DIMER BLD IA.RAPID-MCNC: 1047 NG/ML DDU — HIGH
DIFF PNL FLD: ABNORMAL
EGFR: 5 ML/MIN/1.73M2 — LOW
EGFR: 6 ML/MIN/1.73M2 — LOW
EOSINOPHIL # BLD AUTO: 0.01 K/UL — SIGNIFICANT CHANGE UP (ref 0–0.5)
EOSINOPHIL NFR BLD AUTO: 0.1 % — SIGNIFICANT CHANGE UP (ref 0–6)
EPI CELLS # UR: ABNORMAL /HPF
GAS PNL BLDA: SIGNIFICANT CHANGE UP
GLUCOSE SERPL-MCNC: 114 MG/DL — HIGH (ref 70–99)
GLUCOSE SERPL-MCNC: 167 MG/DL — HIGH (ref 70–99)
GLUCOSE UR QL: NEGATIVE — SIGNIFICANT CHANGE UP
HCT VFR BLD CALC: 33.5 % — LOW (ref 34.5–45)
HGB BLD-MCNC: 11.5 G/DL — SIGNIFICANT CHANGE UP (ref 11.5–15.5)
IMM GRANULOCYTES NFR BLD AUTO: 1.2 % — HIGH (ref 0–0.9)
INR BLD: 1.04 RATIO — SIGNIFICANT CHANGE UP (ref 0.88–1.16)
INR BLD: 1.09 RATIO — SIGNIFICANT CHANGE UP (ref 0.88–1.16)
KETONES UR-MCNC: NEGATIVE — SIGNIFICANT CHANGE UP
LACTATE SERPL-SCNC: 0.7 MMOL/L — SIGNIFICANT CHANGE UP (ref 0.7–2)
LEUKOCYTE ESTERASE UR-ACNC: ABNORMAL
LYMPHOCYTES # BLD AUTO: 0.48 K/UL — LOW (ref 1–3.3)
LYMPHOCYTES # BLD AUTO: 2.6 % — LOW (ref 13–44)
MAGNESIUM SERPL-MCNC: 2.8 MG/DL — HIGH (ref 1.6–2.6)
MANUAL SMEAR VERIFICATION: SIGNIFICANT CHANGE UP
MCHC RBC-ENTMCNC: 33 PG — SIGNIFICANT CHANGE UP (ref 27–34)
MCHC RBC-ENTMCNC: 34.3 GM/DL — SIGNIFICANT CHANGE UP (ref 32–36)
MCV RBC AUTO: 96 FL — SIGNIFICANT CHANGE UP (ref 80–100)
MONOCYTES # BLD AUTO: 1.62 K/UL — HIGH (ref 0–0.9)
MONOCYTES NFR BLD AUTO: 8.9 % — SIGNIFICANT CHANGE UP (ref 2–14)
NEUTROPHILS # BLD AUTO: 15.87 K/UL — HIGH (ref 1.8–7.4)
NEUTROPHILS NFR BLD AUTO: 86.8 % — HIGH (ref 43–77)
NITRITE UR-MCNC: NEGATIVE — SIGNIFICANT CHANGE UP
NRBC # BLD: 0 /100 WBCS — SIGNIFICANT CHANGE UP (ref 0–0)
OSMOLALITY UR: 365 MOS/KG — SIGNIFICANT CHANGE UP (ref 50–1200)
PH UR: 5 — SIGNIFICANT CHANGE UP (ref 5–8)
PHOSPHATE SERPL-MCNC: 7 MG/DL — HIGH (ref 2.5–4.5)
PLAT MORPH BLD: NORMAL — SIGNIFICANT CHANGE UP
PLATELET # BLD AUTO: 257 K/UL — SIGNIFICANT CHANGE UP (ref 150–400)
PLATELET COUNT - ESTIMATE: ABNORMAL
POTASSIUM SERPL-MCNC: 5.7 MMOL/L — HIGH (ref 3.5–5.3)
POTASSIUM SERPL-MCNC: 7.2 MMOL/L — CRITICAL HIGH (ref 3.5–5.3)
POTASSIUM SERPL-SCNC: 5.7 MMOL/L — HIGH (ref 3.5–5.3)
POTASSIUM SERPL-SCNC: 7.2 MMOL/L — CRITICAL HIGH (ref 3.5–5.3)
POTASSIUM UR-SCNC: 39 MMOL/L — SIGNIFICANT CHANGE UP
PROT SERPL-MCNC: 6.4 G/DL — SIGNIFICANT CHANGE UP (ref 6–8.3)
PROT UR-MCNC: 30 MG/DL
PROTHROM AB SERPL-ACNC: 12.4 SEC — SIGNIFICANT CHANGE UP (ref 10.5–13.4)
PROTHROM AB SERPL-ACNC: 13 SEC — SIGNIFICANT CHANGE UP (ref 10.5–13.4)
RAPID RVP RESULT: SIGNIFICANT CHANGE UP
RBC # BLD: 3.49 M/UL — LOW (ref 3.8–5.2)
RBC # FLD: 12.2 % — SIGNIFICANT CHANGE UP (ref 10.3–14.5)
RBC BLD AUTO: NORMAL — SIGNIFICANT CHANGE UP
RBC CASTS # UR COMP ASSIST: ABNORMAL /HPF (ref 0–2)
SARS-COV-2 RNA SPEC QL NAA+PROBE: SIGNIFICANT CHANGE UP
SODIUM SERPL-SCNC: 125 MMOL/L — LOW (ref 135–145)
SODIUM SERPL-SCNC: 129 MMOL/L — LOW (ref 135–145)
SODIUM UR-SCNC: 35 MMOL/L — SIGNIFICANT CHANGE UP
SP GR SPEC: 1.01 — SIGNIFICANT CHANGE UP (ref 1.01–1.02)
T4 AB SER-ACNC: 5.5 UG/DL — SIGNIFICANT CHANGE UP (ref 4.6–12)
TSH SERPL-MCNC: 1.8 UU/ML — SIGNIFICANT CHANGE UP (ref 0.34–4.82)
UROBILINOGEN FLD QL: NEGATIVE — SIGNIFICANT CHANGE UP
WBC # BLD: 18.26 K/UL — HIGH (ref 3.8–10.5)
WBC # FLD AUTO: 18.26 K/UL — HIGH (ref 3.8–10.5)
WBC UR QL: >50 /HPF (ref 0–5)

## 2023-06-01 PROCEDURE — 71045 X-RAY EXAM CHEST 1 VIEW: CPT | Mod: 26

## 2023-06-01 PROCEDURE — 74176 CT ABD & PELVIS W/O CONTRAST: CPT | Mod: 26,MA

## 2023-06-01 PROCEDURE — 99291 CRITICAL CARE FIRST HOUR: CPT

## 2023-06-01 PROCEDURE — 99291 CRITICAL CARE FIRST HOUR: CPT | Mod: GC

## 2023-06-01 PROCEDURE — 93010 ELECTROCARDIOGRAM REPORT: CPT

## 2023-06-01 PROCEDURE — 71250 CT THORAX DX C-: CPT | Mod: 26,MA

## 2023-06-01 PROCEDURE — 70450 CT HEAD/BRAIN W/O DYE: CPT | Mod: 26,MA

## 2023-06-01 RX ORDER — DEXTROSE 50 % IN WATER 50 %
50 SYRINGE (ML) INTRAVENOUS ONCE
Refills: 0 | Status: COMPLETED | OUTPATIENT
Start: 2023-06-01 | End: 2023-06-01

## 2023-06-01 RX ORDER — SODIUM CHLORIDE 9 MG/ML
2000 INJECTION INTRAMUSCULAR; INTRAVENOUS; SUBCUTANEOUS ONCE
Refills: 0 | Status: COMPLETED | OUTPATIENT
Start: 2023-06-01 | End: 2023-06-01

## 2023-06-01 RX ORDER — LEVOTHYROXINE SODIUM 125 MCG
38 TABLET ORAL AT BEDTIME
Refills: 0 | Status: DISCONTINUED | OUTPATIENT
Start: 2023-06-01 | End: 2023-06-04

## 2023-06-01 RX ORDER — FERROUS SULFATE 325(65) MG
1 TABLET ORAL
Refills: 0 | DISCHARGE

## 2023-06-01 RX ORDER — HEPARIN SODIUM 5000 [USP'U]/ML
INJECTION INTRAVENOUS; SUBCUTANEOUS
Qty: 25000 | Refills: 0 | Status: DISCONTINUED | OUTPATIENT
Start: 2023-06-01 | End: 2023-06-01

## 2023-06-01 RX ORDER — SODIUM CHLORIDE 9 MG/ML
1000 INJECTION INTRAMUSCULAR; INTRAVENOUS; SUBCUTANEOUS
Refills: 0 | Status: DISCONTINUED | OUTPATIENT
Start: 2023-06-01 | End: 2023-06-02

## 2023-06-01 RX ORDER — AMLODIPINE BESYLATE 2.5 MG/1
1 TABLET ORAL
Refills: 0 | DISCHARGE

## 2023-06-01 RX ORDER — ASPIRIN/CALCIUM CARB/MAGNESIUM 324 MG
1 TABLET ORAL
Refills: 0 | DISCHARGE

## 2023-06-01 RX ORDER — INSULIN HUMAN 100 [IU]/ML
5 INJECTION, SOLUTION SUBCUTANEOUS ONCE
Refills: 0 | Status: COMPLETED | OUTPATIENT
Start: 2023-06-01 | End: 2023-06-01

## 2023-06-01 RX ORDER — ALBUTEROL 90 UG/1
3 AEROSOL, METERED ORAL
Refills: 0 | DISCHARGE

## 2023-06-01 RX ORDER — HEPARIN SODIUM 5000 [USP'U]/ML
5000 INJECTION INTRAVENOUS; SUBCUTANEOUS EVERY 6 HOURS
Refills: 0 | Status: DISCONTINUED | OUTPATIENT
Start: 2023-06-01 | End: 2023-06-01

## 2023-06-01 RX ORDER — SODIUM CHLORIDE 9 MG/ML
1000 INJECTION INTRAMUSCULAR; INTRAVENOUS; SUBCUTANEOUS ONCE
Refills: 0 | Status: COMPLETED | OUTPATIENT
Start: 2023-06-01 | End: 2023-06-01

## 2023-06-01 RX ORDER — METOPROLOL TARTRATE 50 MG
1 TABLET ORAL
Refills: 0 | DISCHARGE

## 2023-06-01 RX ORDER — LEVOTHYROXINE SODIUM 125 MCG
1 TABLET ORAL
Refills: 0 | DISCHARGE

## 2023-06-01 RX ORDER — DOCUSATE SODIUM 100 MG
2 CAPSULE ORAL
Refills: 0 | DISCHARGE

## 2023-06-01 RX ORDER — TRAMADOL HYDROCHLORIDE 50 MG/1
1 TABLET ORAL
Refills: 0 | DISCHARGE

## 2023-06-01 RX ORDER — PANTOPRAZOLE SODIUM 20 MG/1
40 TABLET, DELAYED RELEASE ORAL DAILY
Refills: 0 | Status: DISCONTINUED | OUTPATIENT
Start: 2023-06-01 | End: 2023-06-04

## 2023-06-01 RX ORDER — SENNA PLUS 8.6 MG/1
1 TABLET ORAL
Refills: 0 | DISCHARGE

## 2023-06-01 RX ORDER — CALCIUM CARBONATE 500(1250)
1 TABLET ORAL
Refills: 0 | DISCHARGE

## 2023-06-01 RX ORDER — HEPARIN SODIUM 5000 [USP'U]/ML
2500 INJECTION INTRAVENOUS; SUBCUTANEOUS EVERY 6 HOURS
Refills: 0 | Status: DISCONTINUED | OUTPATIENT
Start: 2023-06-01 | End: 2023-06-01

## 2023-06-01 RX ORDER — CEFEPIME 1 G/1
1000 INJECTION, POWDER, FOR SOLUTION INTRAMUSCULAR; INTRAVENOUS EVERY 24 HOURS
Refills: 0 | Status: DISCONTINUED | OUTPATIENT
Start: 2023-06-02 | End: 2023-06-03

## 2023-06-01 RX ORDER — ESOMEPRAZOLE MAGNESIUM 40 MG/1
1 CAPSULE, DELAYED RELEASE ORAL
Refills: 0 | DISCHARGE

## 2023-06-01 RX ORDER — CEFEPIME 1 G/1
1000 INJECTION, POWDER, FOR SOLUTION INTRAMUSCULAR; INTRAVENOUS ONCE
Refills: 0 | Status: COMPLETED | OUTPATIENT
Start: 2023-06-01 | End: 2023-06-01

## 2023-06-01 RX ORDER — LOSARTAN POTASSIUM 100 MG/1
1 TABLET, FILM COATED ORAL
Refills: 0 | DISCHARGE

## 2023-06-01 RX ORDER — IPRATROPIUM/ALBUTEROL SULFATE 18-103MCG
3 AEROSOL WITH ADAPTER (GRAM) INHALATION EVERY 6 HOURS
Refills: 0 | Status: DISCONTINUED | OUTPATIENT
Start: 2023-06-01 | End: 2023-06-04

## 2023-06-01 RX ORDER — CHLORHEXIDINE GLUCONATE 213 G/1000ML
1 SOLUTION TOPICAL
Refills: 0 | Status: DISCONTINUED | OUTPATIENT
Start: 2023-06-01 | End: 2023-06-06

## 2023-06-01 RX ADMIN — CEFEPIME 100 MILLIGRAM(S): 1 INJECTION, POWDER, FOR SOLUTION INTRAMUSCULAR; INTRAVENOUS at 23:19

## 2023-06-01 RX ADMIN — SODIUM CHLORIDE 2000 MILLILITER(S): 9 INJECTION INTRAMUSCULAR; INTRAVENOUS; SUBCUTANEOUS at 20:16

## 2023-06-01 RX ADMIN — CHLORHEXIDINE GLUCONATE 1 APPLICATION(S): 213 SOLUTION TOPICAL at 23:18

## 2023-06-01 RX ADMIN — Medication 38 MICROGRAM(S): at 23:15

## 2023-06-01 RX ADMIN — Medication 50 MILLILITER(S): at 23:27

## 2023-06-01 RX ADMIN — SODIUM CHLORIDE 1000 MILLILITER(S): 9 INJECTION INTRAMUSCULAR; INTRAVENOUS; SUBCUTANEOUS at 23:42

## 2023-06-01 RX ADMIN — Medication 50 MILLILITER(S): at 20:22

## 2023-06-01 RX ADMIN — INSULIN HUMAN 5 UNIT(S): 100 INJECTION, SOLUTION SUBCUTANEOUS at 20:21

## 2023-06-01 RX ADMIN — INSULIN HUMAN 5 UNIT(S): 100 INJECTION, SOLUTION SUBCUTANEOUS at 23:26

## 2023-06-01 NOTE — ED ADULT NURSE NOTE - OBJECTIVE STATEMENT
36.8 Pt a&ox3, in ED for change in mental status as per daughters, pt isnt eating drinking or behaving herself, pt non-verbal at this time, pt came for rehab secondary to right hip replacement, pt found on nasal cannula 5 L, 94%, no SOB, unlabored breathing, equal rise & fall, no N/V/D.

## 2023-06-01 NOTE — H&P ADULT - NSHPPHYSICALEXAM_GEN_ALL_CORE
PHYSICAL EXAMINATION:  GENERAL: NAD, well built  HEAD:  Atraumatic, Normocephalic  EYES:  conjunctiva and sclera clear  NECK: Supple, No JVD, Normal thyroid  CHEST/LUNG: Clear to auscultation. Clear to percussion bilaterally; No rales, rhonchi, wheezing, or rubs  HEART: Regular rate and rhythm; No murmurs, rubs, or gallops  ABDOMEN: Soft, Nontender, Nondistended; Bowel sounds present  NERVOUS SYSTEM:  Alert & Oriented X3,    EXTREMITIES:  2+ Peripheral Pulses, No clubbing, cyanosis, or edema  SKIN: warm dry PHYSICAL EXAMINATION:  GENERAL: confused appearing female, on 5L NC sat 96%  HEAD:  Atraumatic, Normocephalic  EYES:  conjunctiva and sclera clear  NECK: Supple, No JVD  CHEST/LUNG: CTAB No rales, rhonchi, wheezing, or rubs  HEART: tachy rate and reg rhythm; No murmurs, rubs, or gallops  ABDOMEN: Soft, Nontender, Nondistended; Bowel sounds present  NERVOUS SYSTEM:  Alert & Oriented X0, not responding to comands. pupils PERRL, CN 2-12 grossly intact. Moving all extremities willfully and equally. Strength 5/5 in all four extremities.  EXTREMITIES:  2+ Peripheral Pulses, No clubbing, cyanosis, or edema  SKIN: warm dry - no decub ulcer over back. + RIGHT hip incision site CDI dressing. PHYSICAL EXAMINATION:  GENERAL: confused appearing female, on 5L NC sat 96%  HEAD:  Atraumatic, Normocephalic  EYES:  conjunctiva and sclera clear  NECK: Supple, No JVD  CHEST/LUNG: CTAB No rales, rhonchi, wheezing, or rubs  HEART: tachy rate and reg rhythm; No murmurs, rubs, or gallops  ABDOMEN: Soft, Nontender, Nondistended; Bowel sounds present  NERVOUS SYSTEM:  Alert & Oriented X0, not responding to commands. pupils PERRL, CN 2-12 grossly intact. Moving all extremities willfully and equally. Strength 5/5 in all four extremities.  EXTREMITIES:  2+ Peripheral Pulses, No clubbing, cyanosis, or edema  SKIN: warm dry - no decub ulcer over back. + RIGHT hip incision site CDI dressing.

## 2023-06-01 NOTE — H&P ADULT - NSICDXPASTMEDICALHX_GEN_ALL_CORE_FT
PAST MEDICAL HISTORY:  Asthma with acute exacerbation, unspecified asthma severity     Essential hypertension      PAST MEDICAL HISTORY:  Asthma with acute exacerbation, unspecified asthma severity     Constipation     Essential hypertension     Fe deficiency anemia     GERD (gastroesophageal reflux disease)     Hypothyroid

## 2023-06-01 NOTE — H&P ADULT - ASSESSMENT
Assessment  81 yo F w/ Hx Asthma, primary HTN, GERD, constipation, Fe def Anemia, Hypothyroidism and recent hip replacement who was sent in from NH for AMS and hypoxia. Admit for acute renal failure w/ hyperkalemia 2/2 urinary obstruction and AHRF w/ c/f PE in setting of recent joint replacement.     PLAN:    -----------------CNS:------------------  #Acute metabolic encephalopathy  in setting of urine retention and uremia  c/f UTI  resolve as below  CT head w/out acute finding, unremarkable     -----------------CVS:------------------  #Primary HTN  HOLDING home meds, BP soft on admission  (amlodipine, metoprolol, losartan)    -----------------RESPIRATORY:--------  #AHRF  c/f PE, does not appear to be in asthma exac  cont duonebs  f/u ABG  empirically Tx w/ heparin drip for now    #Asthma  On albuterol at home  will cont duonebs    -----------------GI:----------------------  NPO for now while encephalopathic  accucheck q 6    -----------------ID:-----------------------  #suspect UTI  UA appears purulent  f/u UA UCx BCx  s/p Cefepime x 1  cont renal dosed Cefepime    -----------------RENAL: ---------------  #ARF  new onset BUN Cr    -----------------HEME/ONC: ---------------    -----------------EXTREMITIES:-------    -----------------PROPHYLAXIS: -------    DVT   GI    -----------------DISPOSITION-------- Assessment  81 yo F w/ Hx Asthma, primary HTN, GERD, constipation, Fe def Anemia, Hypothyroidism and recent hip replacement who was sent in from NH for AMS and hypoxia. Admit for acute renal failure w/ hyperkalemia 2/2 urinary obstruction and AHRF w/ c/f PE in setting of recent joint replacement.     PLAN:    -----------------CNS:------------------  #Acute metabolic encephalopathy  in setting of urine retention and uremia  c/f UTI  resolve as below  CT head w/out acute finding, unremarkable     -----------------CVS:------------------  #Primary HTN  HOLDING home meds, BP soft on admission  (amlodipine, metoprolol, losartan)    -----------------RESPIRATORY:--------  #AHRF  c/f PE, does not appear to be in asthma exac  cont duonebs  f/u ABG  empirically Tx w/ heparin drip for now    #Asthma  On albuterol at home  will cont duonebs    -----------------GI:----------------------  NPO for now while encephalopathic  accucheck q 6    #GERD  cont PPI    #Constipation  cont bowel regimen when rick PO    -----------------ID:-----------------------  #suspect UTI  UA appears purulent  f/u UA UCx BCx  s/p Cefepime x 1  cont renal dosed Cefepime 1g q 24    -----------------RENAL: ---------------  #ARF  new onset BUN Cr 173/ 7.86 in setting of obstruction  CT showing obstruction urine retention and b/l hydronephrosis  s/p 2L ivf bolus  placed benavides > will clamp after 1L o/p  monitor urine output  f/u urine lytes  NEPHRO Dr. Brooks consulted/ Dr. Kent consulted    #Hyperkalemia  K 7.2 on admission, EKG without peaked T waves  s/p insulin 5, dextrose and 2L IVF bolus    -----------------HEME/ONC: ---------------  #Fe def anemia  cont home iron when rick PO    -----------------EXTREMITIES:-------  Peripheral IV  Benavides placed in ED 6/1 for acute retention    -----------------PROPHYLAXIS: -------    DVT heparin drip  GI PPI IV QD    -----------------DISPOSITION--------  ICU Assessment  83 yo F w/ Hx Asthma, primary HTN, GERD, constipation, Fe def Anemia, Hypothyroidism and recent hip replacement who was sent in from NH for AMS and hypoxia. Admit for acute renal failure w/ hyperkalemia 2/2 urinary obstruction and AHRF w/ c/f PE in setting of recent joint replacement.     PLAN:    -----------------CNS:------------------  #Acute metabolic encephalopathy  in setting of urine retention and uremia  c/f UTI  resolve as below  CT head w/out acute finding, unremarkable     -----------------CVS:------------------  #Primary HTN  HOLDING home meds, BP soft on admission  (amlodipine, metoprolol, losartan)    -----------------RESPIRATORY:--------  #AHRF  c/f PE, does not appear to be in asthma exac  cont duonebs  f/u ABG  empirically Tx w/ heparin drip for now    #Asthma  On albuterol at home  will cont duonebs    -----------------GI:----------------------  NPO for now while encephalopathic  accucheck q 6    #GERD  cont PPI    #Constipation  cont bowel regimen when rick PO    -----------------ID:-----------------------  #suspect UTI  UA appears purulent  f/u UA UCx BCx  s/p Cefepime x 1  cont renal dosed Cefepime 1g q 24    -----------------RENAL: ---------------  #ARF  new onset BUN Cr 173/ 7.86 in setting of obstruction  CT showing obstruction urine retention and b/l hydronephrosis  s/p 2L ivf bolus  placed benavides > will clamp after 1L o/p  monitor urine output  f/u urine lytes  NEPHRO Dr. Brooks consulted/ Dr. Kent consulted    #Hyperkalemia  K 7.2 on admission, EKG without peaked T waves  s/p insulin 5, dextrose and 2L IVF bolus    -----------------ENDO:------------------  #Hypothyroidism  on PO synthroid 50  cont IV syntrhoid 37.5 mcg QHS    -----------------HEME/ONC: ---------------  #Fe def anemia  cont home iron when rick PO    -----------------EXTREMITIES:-------  Peripheral IV  Benavides placed in ED 6/1 for acute retention    -----------------PROPHYLAXIS: -------    DVT heparin drip  GI PPI IV QD    -----------------DISPOSITION--------  ICU Assessment  81 yo F w/ Hx Asthma, primary HTN, GERD, constipation, Fe def Anemia, Hypothyroidism and recent hip replacement who was sent in from NH for AMS and hypoxia. Admit for acute renal failure w/ hyperkalemia 2/2 urinary obstruction and AHRF w/ c/f PE in setting of recent joint replacement.     PLAN:    -----------------CNS:------------------  #Acute metabolic encephalopathy  in setting of urine retention and uremia  c/f UTI  resolve as below  CT head w/out acute finding, unremarkable     -----------------CVS:------------------  #Primary HTN  HOLDING home meds, BP soft on admission  (amlodipine, metoprolol, losartan)    -----------------RESPIRATORY:--------  #AHRF  c/f PE?, does not appear to be in asthma exac, no COPD or HF hx  family reports patient is baseline on 2L oxygen   > now tolerating 3L  cont duonebs q6  f/u ABG  titrate O2 as tolerates  no need for heparin drip at this time  f/u doppler b/l LE      #Asthma  On albuterol at home  will cont duonebs    -----------------GI:----------------------  NPO for now while encephalopathic  accucheck q 6    #GERD  cont PPI    #Constipation  cont bowel regimen when rick PO    -----------------ID:-----------------------  #suspect UTI  UA appears purulent  f/u UA UCx BCx  s/p Cefepime x 1  cont renal dosed Cefepime 1g q 24    -----------------RENAL: ---------------  #ARF  new onset BUN Cr 173/ 7.86 in setting of obstruction  CT showing obstruction urine retention and b/l hydronephrosis  s/p 2L ivf bolus  placed benavides > will clamp after 1L o/p  monitor urine output  f/u urine lytes  NEPHRO Dr. Brooks consulted/ Dr. Kent consulted    #Hyperkalemia  K 7.2 on admission, EKG without peaked T waves  s/p insulin 5, dextrose and 2L IVF bolus    -----------------ENDO:------------------  #Hypothyroidism  on PO synthroid 50  cont IV syntrhoid 37.5 mcg QHS    -----------------HEME/ONC: ---------------  #Fe def anemia  cont home iron when rick PO    -----------------EXTREMITIES:-------  Peripheral IV  Benavides placed in ED 6/1 for acute retention    -----------------PROPHYLAXIS: -------    DVT heparin drip  GI PPI IV QD    -----------------DISPOSITION--------  ICU

## 2023-06-01 NOTE — ED ADULT NURSE NOTE - CHIEF COMPLAINT QUOTE
sent from [facility] for AMS x last night, BIBA for low O2 sat in the field - (90% on 2L NC), O2 sat - 95% on 6L NC

## 2023-06-01 NOTE — ED ADULT TRIAGE NOTE - CHIEF COMPLAINT QUOTE
sent from [facility] for AMS x last night, BIBA for low O2 sat in the field - (90% on 2L NC), O2 sat - 95% on 6L NC

## 2023-06-01 NOTE — ED PROVIDER NOTE - NSICDXPASTMEDICALHX_GEN_ALL_CORE_FT
PAST MEDICAL HISTORY:  Asthma with acute exacerbation, unspecified asthma severity     Essential hypertension

## 2023-06-01 NOTE — H&P ADULT - NSICDXPASTSURGICALHX_GEN_ALL_CORE_FT
PAST SURGICAL HISTORY:  No significant past surgical history      PAST SURGICAL HISTORY:  History of arthroplasty of right hip

## 2023-06-01 NOTE — H&P ADULT - NSHPLABSRESULTS_GEN_ALL_CORE
LABS:                        11.5   18.26 )-----------( 257      ( 01 Jun 2023 19:00 )             33.5     06-01    125<L>  |  97  |  173<H>  ----------------------------<  114<H>  7.2<HH>   |  19<L>  |  7.86<H>    Ca    8.6      01 Jun 2023 19:00    TPro  6.4  /  Alb  2.5<L>  /  TBili  0.5  /  DBili  x   /  AST  38  /  ALT  11  /  AlkPhos  177<H>  06-01    PT/INR - ( 01 Jun 2023 19:00 )   PT: 12.4 sec;   INR: 1.04 ratio         PTT - ( 01 Jun 2023 19:00 )  PTT:30.9 sec    LIVER FUNCTIONS - ( 01 Jun 2023 19:00 )  Alb: 2.5 g/dL / Pro: 6.4 g/dL / ALK PHOS: 177 U/L / ALT: 11 U/L DA / AST: 38 U/L / GGT: x           Lactate, Blood: 0.7 mmol/L (06-01-23 @ 19:00) LABS:                        11.5   18.26 )-----------( 257      ( 01 Jun 2023 19:00 )             33.5     06-01    125<L>  |  97  |  173<H>  ----------------------------<  114<H>  7.2<HH>   |  19<L>  |  7.86<H>    Ca    8.6      01 Jun 2023 19:00    TPro  6.4  /  Alb  2.5<L>  /  TBili  0.5  /  DBili  x   /  AST  38  /  ALT  11  /  AlkPhos  177<H>  06-01    PT/INR - ( 01 Jun 2023 19:00 )   PT: 12.4 sec;   INR: 1.04 ratio      PTT - ( 01 Jun 2023 19:00 )  PTT:30.9 sec    LIVER FUNCTIONS - ( 01 Jun 2023 19:00 )  Alb: 2.5 g/dL / Pro: 6.4 g/dL / ALK PHOS: 177 U/L / ALT: 11 U/L DA / AST: 38 U/L / GGT: x           Lactate, Blood: 0.7 mmol/L (06-01-23 @ 19:00)    < from: CT Abdomen and Pelvis No Cont (06.01.23 @ 20:08) >      IMPRESSION:  Mild bilateral hydroureteronephrosis to the level of the urinary bladder,   which is distended.    < end of copied text >    < from: CT Head No Cont (06.01.23 @ 20:07) >    IMPRESSION:  Unremarkable head CT.   Ischemic white matter disease and   atrophy lower range typical for age. Patient motion limited scan    --- End of Report ---    < end of copied text >

## 2023-06-01 NOTE — H&P ADULT - HISTORY OF PRESENT ILLNESS
81 yo F w/ Hx Asthma, primary HTN, GERD, constipation, Fe def Anemia, Hypothyroidism and recent hip replacement who was sent in from NH for AMS and hypoxia. Ms. Garcia had a RIGHT hip replacement approx 2 weeks ago at [facility] w/ DC to [facility] for sub acute rehab. Daughter reports that she had began to have diminished PO intake x few days and is not speaking as she usually does. NH started her on Oxygen 6L due to new hypoxia but she does not use O2 at baseline.     In ED: Patient arrived with BUN Cr 173/7.85 and serum K 7.2 w/ EKG showing no peaked T waves. Vitals notable for hypoxia, on 5L NC maintaining 94%.  CT abd showing large distended bladder w/ b/l hydronephrosis. Given 2L IVF, Insulin 5 and Dextrose. Ram placed with urine studies. Urine appeared w/ bipin pus. Given 1x Cefepime dose. 1x Duoneb and ABG for hypoxia.

## 2023-06-01 NOTE — ED PROVIDER NOTE - PHYSICAL EXAMINATION
Exam:  General: Patient lethargic, hypoxic, otherwise vital signs within normal limits  HEENT: airway patent with dry mucous membranes  Cardiac: RRR S1/S2 with strong peripheral pulses  Respiratory: lungs clear without respiratory distress  GI: abdomen soft, tender to palpation diffusely throughout upper abdomen

## 2023-06-01 NOTE — ED ADULT TRIAGE NOTE - SOURCE OF INFORMATION
Statement Selected Statement Selected Statement Selected bus # [id number]/[id number] Statement Selected Statement Selected Statement Selected Statement Selected Statement Selected Statement Selected

## 2023-06-01 NOTE — ED PROVIDER NOTE - PROGRESS NOTE DETAILS
Labs noting severe hypokalemia in setting of CHIP on CKD, suspect severe dehydration is underlying, intravenous fluids ordered, D50/insulin also ordered, source of underlying presentation remains unclear. CTAP reviewed by myself, significant urinary retention with bilateral hydronephrosis likely playing role in CHIP, benavides catheter ordered.  ICU team evaluating.

## 2023-06-01 NOTE — PATIENT PROFILE ADULT - FALL HARM RISK - HARM RISK INTERVENTIONS

## 2023-06-01 NOTE — H&P ADULT - ATTENDING COMMENTS
Patient is an 83 y/o female with PMH of HTN, Asthma? COPD, not on supplemental oxygen at baseline, admitted from a skilled nursing facility where she has resided since HIP replacement surgery 2 weeks ago. She is there for rehabilitation but her appetite has decreased since transfer and today her family describes a change in mental status with her becoming less responsive than baseline. CT scan of the head showed an "unremarkable " study + for age appropriate microvascular ischemic changes and involution. OF note her labs show acute kidney injury with bun/creatinine= 1763/7.8. The patient also had an abdominal CT which shows bilateral hydronephrosis and massive bladder  enlargement. Lab data revealed severe hyperkalemia ( 7.2 mmol/l ). The ICU team has ordered Ram's catheter placement and will coordinate a nephrology consult. The patient also needed supplemental oxygen @ the rehab facility and is now on 5L nasal canula. Will obtain ABG and consider possible causes of hypoxemia including chronic airflow obstruction vs venous thromboembolic disease given recent hip surgery. Dx- acute renal failure with severe hyperkalemia, bladder outlet obstruction. Hypoxia r/o PE. I reviewed all laboratory and roentgenographic data and any previous medical record from [facility] that existed. I also discussed the case with the ED attending or referring physician.

## 2023-06-01 NOTE — ED PROVIDER NOTE - CLINICAL SUMMARY MEDICAL DECISION MAKING FREE TEXT BOX
Patient presenting with decreased mental status and hypoxia post surgery.  Differential includes pneumonia, aspiration, stroke, metabolic abnormality, sepsis - plan for labs, CT head, CT CAP (non contrast, per nursing home paperwork prior Cre approx 3.6), pan culture, lactate.  Will require admission.

## 2023-06-01 NOTE — ED ADULT NURSE NOTE - PRO INTERPRETER NEED 2
Problem: Discharge Planning  Goal: Discharge to home or other facility with appropriate resources  Outcome: Progressing     Problem: Pain - [place]  Goal: Displays adequate comfort level or baseline comfort level  Outcome: Progressing     Problem:  Thermoregulation - [place]/Pediatrics  Goal: Maintains normal body temperature  Outcome: Progressing     Problem: Safety - [place]  Goal: Free from fall injury  Outcome: Progressing     Problem: Normal   Goal:  experiences normal transition  Outcome: Progressing  Goal: Total Weight Loss Less than 10% of birth weight  Outcome: Progressing English

## 2023-06-01 NOTE — ED PROVIDER NOTE - OBJECTIVE STATEMENT
82-year-old woman presenting complaining of altered mental status.  History is being provided by family at bedside.  She had a hip replacement approximately 2 weeks ago at [facility] and was discharged to a rehab facility.  Over the past few days she has lost her appetite and been eating minimally and now today is more confused and is not speaking which is not her baseline.  She was found to be hypoxic at the nursing home today and now is requiring 6 L nasal cannula to maintain O2 sats.  She is not on O2 at baseline.

## 2023-06-02 LAB
ALBUMIN SERPL ELPH-MCNC: 1.9 G/DL — LOW (ref 3.5–5)
ALP SERPL-CCNC: 145 U/L — HIGH (ref 40–120)
ALT FLD-CCNC: 8 U/L DA — LOW (ref 10–60)
ANION GAP SERPL CALC-SCNC: 2 MMOL/L — LOW (ref 5–17)
ANION GAP SERPL CALC-SCNC: 6 MMOL/L — SIGNIFICANT CHANGE UP (ref 5–17)
ANION GAP SERPL CALC-SCNC: 7 MMOL/L — SIGNIFICANT CHANGE UP (ref 5–17)
ANION GAP SERPL CALC-SCNC: 7 MMOL/L — SIGNIFICANT CHANGE UP (ref 5–17)
AST SERPL-CCNC: 31 U/L — SIGNIFICANT CHANGE UP (ref 10–40)
BASOPHILS # BLD AUTO: 0.03 K/UL — SIGNIFICANT CHANGE UP (ref 0–0.2)
BASOPHILS NFR BLD AUTO: 0.2 % — SIGNIFICANT CHANGE UP (ref 0–2)
BILIRUB SERPL-MCNC: 0.5 MG/DL — SIGNIFICANT CHANGE UP (ref 0.2–1.2)
BUN SERPL-MCNC: 114 MG/DL — HIGH (ref 7–18)
BUN SERPL-MCNC: 121 MG/DL — HIGH (ref 7–18)
BUN SERPL-MCNC: 132 MG/DL — HIGH (ref 7–18)
BUN SERPL-MCNC: 82 MG/DL — HIGH (ref 7–18)
CALCIUM SERPL-MCNC: 6.9 MG/DL — LOW (ref 8.4–10.5)
CALCIUM SERPL-MCNC: 7.8 MG/DL — LOW (ref 8.4–10.5)
CALCIUM SERPL-MCNC: 8.4 MG/DL — SIGNIFICANT CHANGE UP (ref 8.4–10.5)
CALCIUM SERPL-MCNC: 8.6 MG/DL — SIGNIFICANT CHANGE UP (ref 8.4–10.5)
CHLORIDE SERPL-SCNC: 111 MMOL/L — HIGH (ref 96–108)
CHLORIDE SERPL-SCNC: 114 MMOL/L — HIGH (ref 96–108)
CHLORIDE SERPL-SCNC: 114 MMOL/L — HIGH (ref 96–108)
CHLORIDE SERPL-SCNC: 119 MMOL/L — HIGH (ref 96–108)
CO2 SERPL-SCNC: 17 MMOL/L — LOW (ref 22–31)
CO2 SERPL-SCNC: 17 MMOL/L — LOW (ref 22–31)
CO2 SERPL-SCNC: 20 MMOL/L — LOW (ref 22–31)
CO2 SERPL-SCNC: 25 MMOL/L — SIGNIFICANT CHANGE UP (ref 22–31)
CREAT SERPL-MCNC: 1.15 MG/DL — SIGNIFICANT CHANGE UP (ref 0.5–1.3)
CREAT SERPL-MCNC: 2.9 MG/DL — HIGH (ref 0.5–1.3)
CREAT SERPL-MCNC: 3.28 MG/DL — HIGH (ref 0.5–1.3)
CREAT SERPL-MCNC: 4.83 MG/DL — HIGH (ref 0.5–1.3)
EGFR: 14 ML/MIN/1.73M2 — LOW
EGFR: 16 ML/MIN/1.73M2 — LOW
EGFR: 48 ML/MIN/1.73M2 — LOW
EGFR: 9 ML/MIN/1.73M2 — LOW
EOSINOPHIL # BLD AUTO: 0.04 K/UL — SIGNIFICANT CHANGE UP (ref 0–0.5)
EOSINOPHIL NFR BLD AUTO: 0.3 % — SIGNIFICANT CHANGE UP (ref 0–6)
GAS PNL BLDA: SIGNIFICANT CHANGE UP
GAS PNL BLDA: SIGNIFICANT CHANGE UP
GLUCOSE SERPL-MCNC: 101 MG/DL — HIGH (ref 70–99)
GLUCOSE SERPL-MCNC: 112 MG/DL — HIGH (ref 70–99)
GLUCOSE SERPL-MCNC: 115 MG/DL — HIGH (ref 70–99)
GLUCOSE SERPL-MCNC: 361 MG/DL — HIGH (ref 70–99)
HCT VFR BLD CALC: 33.4 % — LOW (ref 34.5–45)
HGB BLD-MCNC: 11 G/DL — LOW (ref 11.5–15.5)
IMM GRANULOCYTES NFR BLD AUTO: 1.3 % — HIGH (ref 0–0.9)
LYMPHOCYTES # BLD AUTO: 0.39 K/UL — LOW (ref 1–3.3)
LYMPHOCYTES # BLD AUTO: 3 % — LOW (ref 13–44)
MAGNESIUM SERPL-MCNC: 2.4 MG/DL — SIGNIFICANT CHANGE UP (ref 1.6–2.6)
MAGNESIUM SERPL-MCNC: 2.6 MG/DL — SIGNIFICANT CHANGE UP (ref 1.6–2.6)
MAGNESIUM SERPL-MCNC: 2.7 MG/DL — HIGH (ref 1.6–2.6)
MCHC RBC-ENTMCNC: 32.1 PG — SIGNIFICANT CHANGE UP (ref 27–34)
MCHC RBC-ENTMCNC: 32.9 GM/DL — SIGNIFICANT CHANGE UP (ref 32–36)
MCV RBC AUTO: 97.4 FL — SIGNIFICANT CHANGE UP (ref 80–100)
MONOCYTES # BLD AUTO: 1.13 K/UL — HIGH (ref 0–0.9)
MONOCYTES NFR BLD AUTO: 8.6 % — SIGNIFICANT CHANGE UP (ref 2–14)
NEUTROPHILS # BLD AUTO: 11.36 K/UL — HIGH (ref 1.8–7.4)
NEUTROPHILS NFR BLD AUTO: 86.6 % — HIGH (ref 43–77)
NRBC # BLD: 0 /100 WBCS — SIGNIFICANT CHANGE UP (ref 0–0)
PHOSPHATE SERPL-MCNC: 2.5 MG/DL — SIGNIFICANT CHANGE UP (ref 2.5–4.5)
PHOSPHATE SERPL-MCNC: 4.7 MG/DL — HIGH (ref 2.5–4.5)
PHOSPHATE SERPL-MCNC: 5.7 MG/DL — HIGH (ref 2.5–4.5)
PLATELET # BLD AUTO: 225 K/UL — SIGNIFICANT CHANGE UP (ref 150–400)
POTASSIUM SERPL-MCNC: 4.3 MMOL/L — SIGNIFICANT CHANGE UP (ref 3.5–5.3)
POTASSIUM SERPL-MCNC: 4.4 MMOL/L — SIGNIFICANT CHANGE UP (ref 3.5–5.3)
POTASSIUM SERPL-MCNC: 4.9 MMOL/L — SIGNIFICANT CHANGE UP (ref 3.5–5.3)
POTASSIUM SERPL-MCNC: 5.2 MMOL/L — SIGNIFICANT CHANGE UP (ref 3.5–5.3)
POTASSIUM SERPL-SCNC: 4.3 MMOL/L — SIGNIFICANT CHANGE UP (ref 3.5–5.3)
POTASSIUM SERPL-SCNC: 4.4 MMOL/L — SIGNIFICANT CHANGE UP (ref 3.5–5.3)
POTASSIUM SERPL-SCNC: 4.9 MMOL/L — SIGNIFICANT CHANGE UP (ref 3.5–5.3)
POTASSIUM SERPL-SCNC: 5.2 MMOL/L — SIGNIFICANT CHANGE UP (ref 3.5–5.3)
PROT SERPL-MCNC: 5.4 G/DL — LOW (ref 6–8.3)
RBC # BLD: 3.43 M/UL — LOW (ref 3.8–5.2)
RBC # FLD: 12.5 % — SIGNIFICANT CHANGE UP (ref 10.3–14.5)
SODIUM SERPL-SCNC: 134 MMOL/L — LOW (ref 135–145)
SODIUM SERPL-SCNC: 138 MMOL/L — SIGNIFICANT CHANGE UP (ref 135–145)
SODIUM SERPL-SCNC: 141 MMOL/L — SIGNIFICANT CHANGE UP (ref 135–145)
SODIUM SERPL-SCNC: 146 MMOL/L — HIGH (ref 135–145)
T3 SERPL-MCNC: 28 NG/DL — LOW (ref 80–200)
UUN UR-MCNC: 416 MG/DL — SIGNIFICANT CHANGE UP
WBC # BLD: 13.12 K/UL — HIGH (ref 3.8–10.5)
WBC # FLD AUTO: 13.12 K/UL — HIGH (ref 3.8–10.5)

## 2023-06-02 PROCEDURE — 93971 EXTREMITY STUDY: CPT | Mod: 26,LT

## 2023-06-02 RX ORDER — SODIUM CHLORIDE 9 MG/ML
1000 INJECTION, SOLUTION INTRAVENOUS
Refills: 0 | Status: DISCONTINUED | OUTPATIENT
Start: 2023-06-02 | End: 2023-06-02

## 2023-06-02 RX ORDER — ACETAMINOPHEN 500 MG
1000 TABLET ORAL ONCE
Refills: 0 | Status: COMPLETED | OUTPATIENT
Start: 2023-06-02 | End: 2023-06-02

## 2023-06-02 RX ORDER — HEPARIN SODIUM 5000 [USP'U]/ML
5000 INJECTION INTRAVENOUS; SUBCUTANEOUS EVERY 8 HOURS
Refills: 0 | Status: DISCONTINUED | OUTPATIENT
Start: 2023-06-02 | End: 2023-06-12

## 2023-06-02 RX ORDER — METOPROLOL TARTRATE 50 MG
2.5 TABLET ORAL ONCE
Refills: 0 | Status: COMPLETED | OUTPATIENT
Start: 2023-06-02 | End: 2023-06-02

## 2023-06-02 RX ORDER — METOPROLOL TARTRATE 50 MG
5 TABLET ORAL ONCE
Refills: 0 | Status: DISCONTINUED | OUTPATIENT
Start: 2023-06-02 | End: 2023-06-02

## 2023-06-02 RX ORDER — ACETAMINOPHEN 500 MG
1000 TABLET ORAL ONCE
Refills: 0 | Status: COMPLETED | OUTPATIENT
Start: 2023-06-03 | End: 2023-06-03

## 2023-06-02 RX ORDER — SODIUM CHLORIDE 9 MG/ML
1000 INJECTION, SOLUTION INTRAVENOUS
Refills: 0 | Status: DISCONTINUED | OUTPATIENT
Start: 2023-06-02 | End: 2023-06-03

## 2023-06-02 RX ORDER — SODIUM CHLORIDE 9 MG/ML
500 INJECTION, SOLUTION INTRAVENOUS ONCE
Refills: 0 | Status: COMPLETED | OUTPATIENT
Start: 2023-06-02 | End: 2023-06-02

## 2023-06-02 RX ORDER — SODIUM BICARBONATE 1 MEQ/ML
50 SYRINGE (ML) INTRAVENOUS ONCE
Refills: 0 | Status: COMPLETED | OUTPATIENT
Start: 2023-06-02 | End: 2023-06-02

## 2023-06-02 RX ADMIN — SODIUM CHLORIDE 500 MILLILITER(S): 9 INJECTION, SOLUTION INTRAVENOUS at 23:25

## 2023-06-02 RX ADMIN — Medication 2.5 MILLIGRAM(S): at 23:37

## 2023-06-02 RX ADMIN — Medication 50 MILLIEQUIVALENT(S): at 01:23

## 2023-06-02 RX ADMIN — CHLORHEXIDINE GLUCONATE 1 APPLICATION(S): 213 SOLUTION TOPICAL at 05:38

## 2023-06-02 RX ADMIN — SODIUM CHLORIDE 100 MILLILITER(S): 9 INJECTION INTRAMUSCULAR; INTRAVENOUS; SUBCUTANEOUS at 00:39

## 2023-06-02 RX ADMIN — SODIUM CHLORIDE 500 MILLILITER(S): 9 INJECTION, SOLUTION INTRAVENOUS at 21:58

## 2023-06-02 RX ADMIN — Medication 400 MILLIGRAM(S): at 09:08

## 2023-06-02 RX ADMIN — PANTOPRAZOLE SODIUM 40 MILLIGRAM(S): 20 TABLET, DELAYED RELEASE ORAL at 12:24

## 2023-06-02 RX ADMIN — SODIUM CHLORIDE 100 MILLILITER(S): 9 INJECTION, SOLUTION INTRAVENOUS at 14:48

## 2023-06-02 RX ADMIN — HEPARIN SODIUM 5000 UNIT(S): 5000 INJECTION INTRAVENOUS; SUBCUTANEOUS at 14:48

## 2023-06-02 RX ADMIN — Medication 1000 MILLIGRAM(S): at 18:44

## 2023-06-02 RX ADMIN — SODIUM CHLORIDE 100 MILLILITER(S): 9 INJECTION, SOLUTION INTRAVENOUS at 09:08

## 2023-06-02 RX ADMIN — HEPARIN SODIUM 5000 UNIT(S): 5000 INJECTION INTRAVENOUS; SUBCUTANEOUS at 21:58

## 2023-06-02 RX ADMIN — Medication 3 MILLILITER(S): at 03:38

## 2023-06-02 RX ADMIN — CEFEPIME 100 MILLIGRAM(S): 1 INJECTION, POWDER, FOR SOLUTION INTRAMUSCULAR; INTRAVENOUS at 21:58

## 2023-06-02 RX ADMIN — Medication 38 MICROGRAM(S): at 21:58

## 2023-06-02 RX ADMIN — Medication 400 MILLIGRAM(S): at 18:29

## 2023-06-02 RX ADMIN — Medication 1000 MILLIGRAM(S): at 09:25

## 2023-06-02 NOTE — CONSULT NOTE ADULT - ASSESSMENT
Patient is a 81yo Female with Asthma, Essential HTN, GERD, constipation, Iron def Anemia, Hypothyroidism and recent Rt  hip replacement p/w  AMS and hypoxia. Pt a/w CHIP and hyperkalemia with b/l hydronephrosis s/p benavides placement in ER with bipin pus. Nephrology consulted for Elevated serum creatinine.      [facility] NEPHROLOGY  Chaka Brooks M.D.  Ildefonso Gonzalez D.O.  Dana Kent M.D.  Jayne Stratton, MSN, ANP-C  [phone]  Fax: [phone]    [facility] [place] [address], [address]  [place] [address]   Patient is a 83yo Female with Asthma, Essential HTN, GERD, constipation, Iron def Anemia, Hypothyroidism and recent Rt  hip replacement p/w  AMS and hypoxia. Pt a/w CHIP and hyperkalemia with b/l hydronephrosis s/p benavides placement in ER with bipin pus. Nephrology consulted for Elevated serum creatinine.    1. CHIP- unknown baseline SCr. CHIP 2/2 Obstructive uropathy. CT abd/pelvis with distended bladder and mild b/l hydro. s/p benavides placement. Now with post obstructive diuresis. Recc to d/c LR and switch to 1/2 NS @ 100ml/hr (2/3 urine o/p); monitor serum Na. Recc to repeat renal US in 2-3 days to monitor for resolution. Monitor K/Mg/Phos daily; replete prn. Strict I/Os. Avoid nephrotoxins/ NSAIDs/ RCA. Monitor BMP.    2. Obstructive uropathy. See above.     3. Hyperkalemia- resolved. Please avoid LR IVF. Recc to change IVF to 1/2 NS as above.     4. Sepsis 2/2 UTI- +UA, Pt on Cefepime. UCx and BCx pending    [facility] NEPHROLOGY  Chaka Brooks M.D.  Ildefonso Gonzalez D.O.  Dana Kent M.D.  Jayne Stratton, MSN, ANP-C  [phone]  Fax: [phone]    [facility] [place] [address], [address]  [place] [address]   Patient is a 81yo Female with Asthma, Essential HTN, GERD, constipation, Iron def Anemia, Hypothyroidism and recent Rt  hip replacement p/w  AMS and hypoxia. Pt a/w CHIP and hyperkalemia with b/l hydronephrosis s/p benavides placement in ER with bipin pus. Nephrology consulted for Elevated serum creatinine.    1. CHIP- unknown baseline SCr. CHIP 2/2 Obstructive uropathy. CT abd/pelvis with distended bladder and mild b/l hydro. s/p benavides placement. CHIP resolving. Now with post obstructive diuresis. Recc to d/c LR and switch to 1/2 NS @ 100ml/hr (2/3 urine o/p); monitor serum Na. Recc to repeat renal US in 2-3 days to monitor for resolution. Monitor K/Mg/Phos daily; replete prn. Strict I/Os. Avoid nephrotoxins/ NSAIDs/ RCA. Monitor BMP.    2. Obstructive uropathy. See above.     3. Hyperkalemia- resolved. Please avoid LR IVF. Recc to change IVF to 1/2 NS as above.     4. Sepsis 2/2 UTI- +UA, Pt on Cefepime. UCx and BCx pending    [facility] NEPHROLOGY  Chaka Brooks M.D.  Ildefonso Gonzalez D.O.  Dana Kent M.D.  Jayne Stratton, MSN, ANP-C  [phone]  Fax: [phone]    [facility] [place] [address], [address]  [place] [address]

## 2023-06-02 NOTE — CONSULT NOTE ADULT - ASSESSMENT
UTI  Leukocytosis - improving  Sepsis    Plan - Cont Maxipime 1gm iv q24hrs for now but will have to increase dose as cr improves  await culture results.

## 2023-06-02 NOTE — PROGRESS NOTE ADULT - ASSESSMENT
Assessment  81 yo F w/ Hx Asthma, primary HTN, GERD, constipation, Fe def Anemia, Hypothyroidism and recent hip replacement who was sent in from NH for AMS and hypoxia. Admit for acute renal failure w/ hyperkalemia 2/2 urinary obstruction and AHRF w/ c/f PE in setting of recent joint replacement.     PLAN:    -----------------CNS:------------------  #Acute metabolic encephalopathy  in setting of urine retention and uremia  c/f UTI  resolve as below  CT head w/out acute finding, unremarkable     -----------------CVS:------------------  #Primary HTN  HOLDING home meds, BP soft on admission  (amlodipine, metoprolol, losartan)    -----------------RESPIRATORY:--------  #AHRF  c/f PE?, does not appear to be in asthma exac, no COPD or HF hx  family reports patient is baseline on 2L oxygen   > now tolerating 3L  cont duonebs q6  f/u ABG  titrate O2 as tolerates  no need for heparin drip at this time  f/u doppler b/l LE      #Asthma  On albuterol at home  will cont duonebs    -----------------GI:----------------------  NPO for now while encephalopathic  accucheck q 6    #GERD  cont PPI    #Constipation  cont bowel regimen when rick PO    -----------------ID:-----------------------  #suspect UTI  UA appears purulent  f/u UA UCx BCx  s/p Cefepime x 1  cont renal dosed Cefepime 1g q 24    -----------------RENAL: ---------------  #ARF  new onset BUN Cr 173/ 7.86 in setting of obstruction  CT showing obstruction urine retention and b/l hydronephrosis  s/p 2L ivf bolus  placed benavides > will clamp after 1L o/p  monitor urine output  f/u urine lytes  NEPHRO Dr. Brooks consulted/ Dr. Kent consulted    #Hyperkalemia  K 7.2 on admission, EKG without peaked T waves  s/p insulin 5, dextrose and 2L IVF bolus    -----------------ENDO:------------------  #Hypothyroidism  on PO synthroid 50  cont IV syntrhoid 37.5 mcg QHS    -----------------HEME/ONC: ---------------  #Fe def anemia  cont home iron when rick PO    -----------------EXTREMITIES:-------  Peripheral IV  Benavides placed in ED 6/1 for acute retention    -----------------PROPHYLAXIS: -------    DVT heparin drip  GI PPI IV QD    -----------------DISPOSITION--------  ICU Assessment  83 yo F w/ Hx Asthma, primary HTN, GERD, constipation, Fe def Anemia, Hypothyroidism and recent hip replacement who was sent in from NH for AMS and hypoxia. Admit for acute renal failure w/ hyperkalemia 2/2 urinary obstruction and AHRF w/ c/f PE in setting of recent joint replacement.     PLAN:    -----------------CNS:------------------  #Acute metabolic encephalopathy  in setting of urine retention and uremia  c/f UTI  resolve as below  CT head w/out acute finding, unremarkable     -----------------CVS:------------------  #Primary HTN  HOLDING home meds, BP soft on admission  (amlodipine, metoprolol, losartan)    -----------------RESPIRATORY:--------  #AHRF  c/f PE?, does not appear to be in asthma exac, no COPD or HF hx  family reports patient is baseline on 2L oxygen   > now tolerating 3L  cont duonebs q6  f/u ABG  titrate O2 as tolerates,  no need for heparin drip at this time  f/u doppler b/l LE  d-dimer 1047      #Asthma  On albuterol at home  will cont duonebs    -----------------GI:----------------------  NPO for now while encephalopathic  accucheck q 6    #GERD  cont PPI    #Constipation  cont bowel regimen when rick PO    -----------------ID:-----------------------  #suspect UTI  UA appears purulent  f/u UA UCx BCx  s/p Cefepime x 1  cont renal dosed Cefepime 1g q 24    -----------------RENAL: ---------------  #ARF  new onset BUN Cr 173/ 7.86 in setting of obstruction  CT showing obstruction urine retention and b/l hydronephrosis  s/p 2L ivf bolus  placed benavides > will clamp after 1L o/p  monitor urine output  f/u urine lytes  NEPHRO Dr. Brooks consulted/ Dr. Kent consulted    #Hyperkalemia  K 7.2 on admission, EKG without peaked T waves  s/p insulin 5, dextrose and 2L IVF bolus  K on 4.9 > 5.2 (6/2)    -----------------ENDO:------------------  #Hypothyroidism  on PO synthroid 50  cont IV syntrhoid 37.5 mcg QHS    -----------------HEME/ONC: ---------------  #Fe def anemia  cont home iron when to PO    -----------------EXTREMITIES:-------  Peripheral IV  Benavides placed in ED 6/1 for acute retention    -----------------PROPHYLAXIS: -------    DVT heparin drip  GI PPI IV QD    -----------------DISPOSITION--------  ICU

## 2023-06-02 NOTE — CONSULT NOTE ADULT - SUBJECTIVE AND OBJECTIVE BOX
HPI:  81 yo F w/ Hx Asthma, primary HTN, GERD, constipation, Fe def Anemia, Hypothyroidism and recent hip replacement who was sent in from NH for AMS and hypoxia. Ms. Garcia had a RIGHT hip replacement approx 2 weeks ago at [facility] w/ DC to [facility] for sub acute rehab. Daughter reports that she had began to have diminished PO intake x few days and is not speaking as she usually does. NH started her on Oxygen 6L due to new hypoxia but she does not use O2 at baseline.     In ED: Patient arrived with BUN Cr 173/7.85 and serum K 7.2 w/ EKG showing no peaked T waves. Vitals notable for hypoxia, on 5L NC maintaining 94%.  CT abd showing large distended bladder w/ b/l hydronephrosis. Given 2L IVF, Insulin 5 and Dextrose. Ram placed with urine studies. Urine appeared w/ bipin pus. Given 1x Cefepime dose. 1x Duoneb and ABG for hypoxia. (2023 20:12)      PAST MEDICAL & SURGICAL HISTORY:  Asthma with acute exacerbation, unspecified asthma severity      Essential hypertension      Hypothyroid      GERD (gastroesophageal reflux disease)      Constipation      Fe deficiency anemia      History of arthroplasty of right hip          No Known Allergies      Meds:  albuterol/ipratropium for Nebulization 3 milliLiter(s) Nebulizer every 6 hours  cefepime   IVPB 1000 milliGRAM(s) IV Intermittent every 24 hours  chlorhexidine 2% Cloths 1 Application(s) Topical <User Schedule>  heparin   Injectable 5000 Unit(s) SubCutaneous every 8 hours  levothyroxine Injectable 38 MICROGram(s) IV Push at bedtime  pantoprazole  Injectable 40 milliGRAM(s) IV Push daily  sodium chloride 0.45%. 1000 milliLiter(s) IV Continuous <Continuous>      SOCIAL HISTORY:  Smoker:  YES / NO        PACK YEARS:                         WHEN QUIT?  ETOH use:  YES / NO               FREQUENCY / QUANTITY:  Ilic[place] Drug use:  YES / NO  Occupation:  Assisted device use (Cane / Walker):  Live with:    FAMILY HISTORY:  No pertinent family history in first degree relatives        VITALS:  Vital Signs Last 24 Hrs  T(C): 36 (2023 13:00), Max: 37 (2023 17:52)  T(F): 96.8 (2023 13:00), Max: 98.6 (2023 17:52)  HR: 104 (2023 15:00) (85 - 152)  BP: 146/45 (2023 15:00) (96/32 - 146/45)  BP(mean): 69 (2023 15:00) (48 - 71)  RR: 15 (2023 15:00) (15 - 27)  SpO2: 95% (2023 15:00) (90% - 100%)    Parameters below as of 2023 09:00  Patient On (Oxygen Delivery Method): nasal cannula  O2 Flow (L/min): 2      LABS/DIAGNOSTIC TESTS:                          11.0   13.12 )-----------( 225      ( 2023 04:20 )             33.4     WBC Count: 13.12 K/uL ( @ 04:20)  WBC Count: 18.26 K/uL ( @ 19:00)      06-02    138  |  114<H>  |  121<H>  ----------------------------<  101<H>  5.2   |  17<L>  |  2.90<H>    Ca    8.4      2023 06:45  Phos  4.7     06-02  Mg     2.7     06-02    TPro  5.4<L>  /  Alb  1.9<L>  /  TBili  0.5  /  DBili  x   /  AST  31  /  ALT  8<L>  /  AlkPhos  145<H>  06-02      Urinalysis Basic - ( 2023 20:55 )    Color: Yellow / Appearance: Slightly Turbid / S.015 / pH: x  Gluc: x / Ketone: Negative  / Bili: Negative / Urobili: Negative   Blood: x / Protein: 30 mg/dL / Nitrite: Negative   Leuk Esterase: Moderate / RBC: 10-25 /HPF / WBC >50 /HPF   Sq Epi: x / Non Sq Epi: x / Bacteria: Moderate /HPF        LIVER FUNCTIONS - ( 2023 04:20 )  Alb: 1.9 g/dL / Pro: 5.4 g/dL / ALK PHOS: 145 U/L / ALT: 8 U/L DA / AST: 31 U/L / GGT: x             PT/INR - ( 2023 21:20 )   PT: 13.0 sec;   INR: 1.09 ratio         PTT - ( 2023 19:00 )  PTT:30.9 sec    LACTATE:Lactate, Blood: 0.7 mmol/L ( @ 19:00)      ABG - ABG - ( 2023 03:49 )  pH, Arterial: 7.25  pH, Blood: x     /  pCO2: 49    /  pO2: 97    / HCO3: 22    / Base Excess: -6.0  /  SaO2: 98                  CULTURES:       RADIOLOGY:< from: CT Abdomen and Pelvis No Cont (23 @ 20:08) >  ACC: [id number] EXAM:  CT ABDOMEN AND PELVIS   ORDERED BY: ZULEYKA ANDREWS     ACC: [id number] EXAM:  CT CHEST   ORDERED BY: ZULEYKA ANDREWS     PROCEDURE DATE:  2023          INTERPRETATION:  CLINICAL INFORMATION: Change in mental status. Decreased   p.o. intake. Upper abdominal tenderness.    COMPARISON: None.    CONTRAST/COMPLICATIONS:  IV Contrast: NONE  Oral Contrast: NONE  Complications: None reported at time of study completion    PROCEDURE:  CT of the Chest, Abdomen and Pelvis was performed.  Sagittal and coronal reformats were performed.    FINDINGS:  CHEST:  LUNGS AND LARGE AIRWAYS: Mucous impacted airways in the right lower lobe.   Partial compressive atelectasis of the right lower lobe.  PLEURA: Small right pleural effusion.  VESSELS: Atherosclerotic changes of the aorta.  HEART: Heart size is normal. No pericardial effusion.  MEDIASTINUM AND BHARATH: No lymphadenopathy.  CHEST WALL AND LOWER NECK: Within normal limits.    ABDOMEN AND PELVIS:  LIVER: Within normal limits.  BILE DUCTS: Normal caliber.  GALLBLADDER: Within normal limits.  SPLEEN: Within normal limits.  PANCREAS: Within normal limits.  ADRENALS: Within normal limits.  KIDNEYS/URETERS: Mild bilateral hydroureteronephrosis to the level of the   urinary bladder.    BLADDER: Distended.  REPRODUCTIVE ORGANS: Uterus and adnexa within normal limits.    BOWEL: No bowel obstruction. Appendix is normal. Colonic diverticulosis  PERITONEUM: No ascites.  VESSELS: Atherosclerotic changes.  RETROPERITONEUM/LYMPH NODES:No lymphadenopathy.  ABDOMINAL WALL: Within normal limits.  BONES: Degenerative changes. Right hip replacement with adjacent foci of   air, possibly postsurgical..    IMPRESSION:  Mild bilateral hydroureteronephrosis to the level of the urinary bladder,   which is distended.    --- End of Report ---            GUS GOEL MD; Attending Radiologist  This document has been electronically signed. 2023  8:41PM    < end of copied text >  ------------------------------------------------------------------------------------------------------------------------------------------------------------------------------------------------  ACC: [id number] EXAM:  US DPLX [facility] EXT VEINS LTD    ORDERED BY: BENNY KANG     PROCEDURE DATE:  2023          INTERPRETATION:  CLINICAL INFORMATION: Recent hip replacement. Change in   mental status.    COMPARISON: None available.    TECHNIQUE: Duplex sonography of the LEFT LOWER extremity veins with color   and spectral Doppler, with and without compression.    FINDINGS:    There is normal compressibility of the left common femoral, femoral and   popliteal veins.  Unable to evaluate the contralateral common femoral vein due to overlying   brace.  Doppler examination shows normal spontaneous and phasic flow.    No calf vein thrombosis is detected, although there is nonvisualization   of the left soleal and gastrocnemius veins.    IMPRESSION:  No evidence of left lower extremity deep venous thrombosis.            --- End of Report ---          ERIC BLEVINS MD; Resident Radiologist  This document has been electronically signed.  DAQUAN NIETO MD; Attending Radiologist  This document has been electronically signed. 2023 11:36AM    < end of copied text >        ROS  [  ] UNABLE TO ELICIT               HPI:  81 yo F w/ Hx Asthma, primary HTN, GERD, constipation, Fe def Anemia, Hypothyroidism and recent hip replacement who was sent in from NH for AMS and hypoxia. Ms. Garcia had a RIGHT hip replacement approx 2 weeks ago at [facility] w/ DC to [facility] for sub acute rehab. Daughter reports that she had began to have diminished PO intake x few days and is not speaking as she usually does. NH started her on Oxygen 6L due to new hypoxia but she does not use O2 at baseline.   In ED: Patient arrived with BUN Cr 173/7.85 and serum K 7.2 w/ EKG showing no peaked T waves. Vitals notable for hypoxia, on 5L NC maintaining 94%.  CT abd showing large distended bladder w/ b/l hydronephrosis. Given 2L IVF, Insulin 5 and Dextrose. Benavides placed with urine studies. Urine appeared w/ bipin pus. Given 1x Cefepime dose. 1x Duoneb and ABG for hypoxia. (2023 20:12)      History as above, asked to see this patient who presented with poor po intake at the rehab she was at after having a hip replacement , she also had abdominal pain , inability to pass urine and a distended bladder with CHIP. She had a benavides inserted and had pure pus coming out, she is currently in the ICU because of EKG changes from hyperkalemia. She is doing better clinically but still has some abdominal pain as her benavides was just changed as her benavides from the ER was clogged. She feels weak but is otherwise feeling better. Her youngest daughter is at the bedside translating for me. She is mildly confused currently. She is denying any SOB, no chest pain, no nausea, vomiting or diarrhea.        PAST MEDICAL & SURGICAL HISTORY:  Asthma with acute exacerbation, unspecified asthma severity      Essential hypertension      Hypothyroid      GERD (gastroesophageal reflux disease)      Constipation      Fe deficiency anemia      History of arthroplasty of right hip          No Known Allergies      Meds:  albuterol/ipratropium for Nebulization 3 milliLiter(s) Nebulizer every 6 hours  cefepime   IVPB 1000 milliGRAM(s) IV Intermittent every 24 hours  chlorhexidine 2% Cloths 1 Application(s) Topical <User Schedule>  heparin   Injectable 5000 Unit(s) SubCutaneous every 8 hours  levothyroxine Injectable 38 MICROGram(s) IV Push at bedtime  pantoprazole  Injectable 40 milliGRAM(s) IV Push daily  sodium chloride 0.45%. 1000 milliLiter(s) IV Continuous <Continuous>      SOCIAL HISTORY:  Smoker: no  ETOH use:  no      FAMILY HISTORY:  No pertinent family history in first degree relatives        VITALS:  Vital Signs Last 24 Hrs  T(C): 36 (2023 13:00), Max: 37 (2023 17:52)  T(F): 96.8 (2023 13:00), Max: 98.6 (2023 17:52)  HR: 104 (2023 15:00) (85 - 152)  BP: 146/45 (2023 15:00) (96/32 - 146/45)  BP(mean): 69 (2023 15:00) (48 - 71)  RR: 15 (2023 15:00) (15 - 27)  SpO2: 95% (2023 15:00) (90% - 100%)    Parameters below as of 2023 09:00  Patient On (Oxygen Delivery Method): nasal cannula  O2 Flow (L/min): 2      LABS/DIAGNOSTIC TESTS:                          11.0   13.12 )-----------( 225      ( 2023 04:20 )             33.4     WBC Count: 13.12 K/uL ( @ 04:20)  WBC Count: 18.26 K/uL ( @ 19:00)      06    138  |  114<H>  |  121<H>  ----------------------------<  101<H>  5.2   |  17<L>  |  2.90<H>    Ca    8.4      2023 06:45  Phos  4.7     06-02  Mg     2.7     06-02    TPro  5.4<L>  /  Alb  1.9<L>  /  TBili  0.5  /  DBili  x   /  AST  31  /  ALT  8<L>  /  AlkPhos  145<H>  06-02      Urinalysis Basic - ( 2023 20:55 )    Color: Yellow / Appearance: Slightly Turbid / S.015 / pH: x  Gluc: x / Ketone: Negative  / Bili: Negative / Urobili: Negative   Blood: x / Protein: 30 mg/dL / Nitrite: Negative   Leuk Esterase: Moderate / RBC: 10-25 /HPF / WBC >50 /HPF   Sq Epi: x / Non Sq Epi: x / Bacteria: Moderate /HPF        LIVER FUNCTIONS - ( 2023 04:20 )  Alb: 1.9 g/dL / Pro: 5.4 g/dL / ALK PHOS: 145 U/L / ALT: 8 U/L DA / AST: 31 U/L / GGT: x             PT/INR - ( 2023 21:20 )   PT: 13.0 sec;   INR: 1.09 ratio         PTT - ( 2023 19:00 )  PTT:30.9 sec    LACTATE:Lactate, Blood: 0.7 mmol/L ( @ 19:00)      ABG - ABG - ( 2023 03:49 )  pH, Arterial: 7.25  pH, Blood: x     /  pCO2: 49    /  pO2: 97    / HCO3: 22    / Base Excess: -6.0  /  SaO2: 98                  CULTURES:       RADIOLOGY:< from: CT Abdomen and Pelvis No Cont (23 @ 20:08) >  ACC: [id number] EXAM:  CT ABDOMEN AND PELVIS   ORDERED BY: ZULEYKA ANDREWS     ACC: [id number] EXAM:  CT CHEST   ORDERED BY: ZULEYKA ANDREWS     PROCEDURE DATE:  2023          INTERPRETATION:  CLINICAL INFORMATION: Change in mental status. Decreased   p.o. intake. Upper abdominal tenderness.    COMPARISON: None.    CONTRAST/COMPLICATIONS:  IV Contrast: NONE  Oral Contrast: NONE  Complications: None reported at time of study completion    PROCEDURE:  CT of the Chest, Abdomen and Pelvis was performed.  Sagittal and coronal reformats were performed.    FINDINGS:  CHEST:  LUNGS AND LARGE AIRWAYS: Mucous impacted airways in the right lower lobe.   Partial compressive atelectasis of the right lower lobe.  PLEURA: Small right pleural effusion.  VESSELS: Atherosclerotic changes of the aorta.  HEART: Heart size is normal. No pericardial effusion.  MEDIASTINUM AND BHARATH: No lymphadenopathy.  CHEST WALL AND LOWER NECK: Within normal limits.    ABDOMEN AND PELVIS:  LIVER: Within normal limits.  BILE DUCTS: Normal caliber.  GALLBLADDER: Within normal limits.  SPLEEN: Within normal limits.  PANCREAS: Within normal limits.  ADRENALS: Within normal limits.  KIDNEYS/URETERS: Mild bilateral hydroureteronephrosis to the level of the   urinary bladder.    BLADDER: Distended.  REPRODUCTIVE ORGANS: Uterus and adnexa within normal limits.    BOWEL: No bowel obstruction. Appendix is normal. Colonic diverticulosis  PERITONEUM: No ascites.  VESSELS: Atherosclerotic changes.  RETROPERITONEUM/LYMPH NODES:No lymphadenopathy.  ABDOMINAL WALL: Within normal limits.  BONES: Degenerative changes. Right hip replacement with adjacent foci of   air, possibly postsurgical..    IMPRESSION:  Mild bilateral hydroureteronephrosis to the level of the urinary bladder,   which is distended.    --- End of Report ---            GUS GOEL MD; Attending Radiologist  This document has been electronically signed. 2023  8:41PM    < end of copied text >  ------------------------------------------------------------------------------------------------------------------------------------------------------------------------------------------------  ACC: [id number] EXAM:  US DPLX LWR EXT VEINS LTD LT   ORDERED BY: BENNY KANG     PROCEDURE DATE:  2023          INTERPRETATION:  CLINICAL INFORMATION: Recent hip replacement. Change in   mental status.    COMPARISON: None available.    TECHNIQUE: Duplex sonography of the LEFT LOWER extremity veins with color   and spectral Doppler, with and without compression.    FINDINGS:    There is normal compressibility of the left common femoral, femoral and   popliteal veins.  Unable to evaluate the contralateral common femoral vein due to overlying   brace.  Doppler examination shows normal spontaneous and phasic flow.    No calf vein thrombosis is detected, although there is nonvisualization   of the left soleal and gastrocnemius veins.    IMPRESSION:  No evidence of left lower extremity deep venous thrombosis.            --- End of Report ---          ERIC BLEVINS MD; Resident Radiologist  This document has been electronically signed.  DAQUAN NIETO MD; Attending Radiologist  This document has been electronically signed. 2023 11:36AM    < end of copied text >        ROS  [  ] UNABLE TO ELICIT

## 2023-06-02 NOTE — CONSULT NOTE ADULT - SUBJECTIVE AND OBJECTIVE BOX
[facility] NEPHROLOGY- CONSULTATION NOTE    Patient is a 81yo Female with Asthma, Essential HTN, GERD, constipation, Iron def Anemia, Hypothyroidism and recent Rt  hip replacement p/w  AMS and hypoxia. Pt a/w CHIP and hyperkalemia with b/l hydronephrosis s/p benavides placement in ER with bipin pus. Nephrology consulted for Elevated serum creatinine.    Limited history due to lethargy. As per pt's daughter Rosey, pt with decreased PO intake. She c/o abd pain and SOB      PAST MEDICAL & SURGICAL HISTORY:  Asthma with acute exacerbation, unspecified asthma severity      Essential hypertension      Hypothyroid      GERD (gastroesophageal reflux disease)      Constipation      Fe deficiency anemia      History of arthroplasty of right hip        No Known Allergies    Home Medications Reviewed  Hospital Medications:   MEDICATIONS  (STANDING):  albuterol/ipratropium for Nebulization 3 milliLiter(s) Nebulizer every 6 hours  cefepime   IVPB 1000 milliGRAM(s) IV Intermittent every 24 hours  chlorhexidine 2% Cloths 1 Application(s) Topical <User Schedule>  heparin   Injectable 5000 Unit(s) SubCutaneous every 8 hours  levothyroxine Injectable 38 MICROGram(s) IV Push at bedtime  pantoprazole  Injectable 40 milliGRAM(s) IV Push daily  sodium chloride 0.45%. 1000 milliLiter(s) (100 mL/Hr) IV Continuous <Continuous>      REVIEW OF SYSTEMS: Unable to obtain due to mental status    VITALS:  T(F): 96.8 (23 @ 13:00), Max: 98.6 (23 @ 17:52)  HR: 104 (23 @ 15:00)  BP: 146/45 (23 @ 15:00)  RR: 15 (23 @ 15:00)  SpO2: 95% (23 @ 15:00)  Wt(kg): --     @ :  -   @ 07:00  --------------------------------------------------------  IN: 1400 mL / OUT: 3600 mL / NET: -2200 mL     @ 07:  -   @ 15:32  --------------------------------------------------------  IN: 700 mL / OUT: 330 mL / NET: 370 mL        Weight (kg): 63.458 ( @ 17:52)  PHYSICAL EXAM:  Gen: Mild lethargy/ Confusion  HEENT: anicteric  Neck: no JVD  Cards: tachy , +S1/S2,   Resp: CTA B/L  GI: soft, diffuse abd tenderness/ no guarding  : +benavides with pale yellow urine  Extremities: +trace LLE edema, RLE in brace    LABS:      138  |  114<H>  |  121<H>  ----------------------------<  101<H>  5.2   |  17<L>  |  2.90<H>    Ca    8.4      2023 06:45  Phos  4.7       Mg     2.7         TPro  5.4<L>  /  Alb  1.9<L>  /  TBili  0.5  /  DBili      /  AST  31  /  ALT  8<L>  /  AlkPhos  145<H>      Creatinine Trend: 2.90 <--, 3.28 <--, 4.83 <--, 6.76 <--, 7.86 <--                        11.0   13.12 )-----------( 225      ( 2023 04:20 )             33.4     Urine Studies:  Urinalysis Basic - ( 2023 20:55 )    Color: Yellow / Appearance: Slightly Turbid / S.015 / pH:   Gluc:  / Ketone: Negative  / Bili: Negative / Urobili: Negative   Blood:  / Protein: 30 mg/dL / Nitrite: Negative   Leuk Esterase: Moderate / RBC: 10-25 /HPF / WBC >50 /HPF   Sq Epi:  / Non Sq Epi:  / Bacteria: Moderate /HPF      Sodium, Random Urine: 35 mmol/L ( @ 20:55)  Osmolality, Random Urine: 365 mos/kg ( @ 20:55)  Potassium, Random Urine: 39 mmol/L ( @ 20:55)  Creatinine, Random Urine: 78 mg/dL ( @ 20:55)    RADIOLOGY & ADDITIONAL STUDIES:      < from: Xray Chest 1 View- PORTABLE-Urgent (23 @ 18:46) >    ACC: [id number] EXAM:  XR CHEST PORTABLE URGENT 1V   ORDERED BY: ZULEYKA ANDREWS     PROCEDURE DATE:  2023          INTERPRETATION:  AP chest on 2023 6:22 PM. Patient has sepsis.    Heart magnified by technique and possibly enlarged.    Slightly increased interstitial markings are seen at right base increased   from 2019.    IMPRESSION: Slightly increased right base interstitial findings. Probable   heart enlargement.    --- End of Report ---            < end of copied text >  < from: CT Chest No Cont (23 @ 20:07) >    ACC: [id number] EXAM:  CT ABDOMEN AND PELVIS   ORDERED BY: ZULEYKA ANDREWS     ACC: [id number] EXAM:  CT CHEST   ORDERED BY: ZULEYKA ANDREWS     PROCEDURE DATE:  2023        < end of copied text >  < from: CT Chest No Cont (23 @ 20:07) >  KIDNEYS/URETERS: Mild bilateral hydroureteronephrosis to the level of the   urinary bladder.    BLADDER: Distended.    < end of copied text >  < from: CT Chest No Cont (23 @ 20:07) >    IMPRESSION:  Mild bilateral hydroureteronephrosis to the level of the urinary bladder,   which is distended.    --- End of Report ---      < end of copied text >  < from: US Duplex Venous Lower Ext Ltd, Left (23 @ 10:56) >    ACC: [id number] EXAM:  US DPLX LWR EXT VEINS LTD LT   ORDERED BY: BENNY KANG     PROCEDURE DATE:  2023          < end of copied text >  < from: US Duplex Venous Lower Ext Ltd, Left (23 @ 10:56) >  IMPRESSION:  No evidence of left lower extremity deep venous thrombosis.            --- End of Report ---    < end of copied text >

## 2023-06-02 NOTE — PHARMACOTHERAPY INTERVENTION NOTE - COMMENTS
Informed RN that the patient's height is not listed on the Flowsheet. RN to add when family is at bedside.  
Reminded prescriber to obtain ID approval for cefepime, per hospital policy.

## 2023-06-02 NOTE — PROGRESS NOTE ADULT - SUBJECTIVE AND OBJECTIVE BOX
INTERVAL HPI/OVERNIGHT EVENTS: ***    PRESSORS: [ ] YES [ ] NO  WHICH:    Antimicrobial:  cefepime   IVPB 1000 milliGRAM(s) IV Intermittent every 24 hours    Cardiovascular:    Pulmonary:  albuterol/ipratropium for Nebulization 3 milliLiter(s) Nebulizer every 6 hours    Hematalogic:    Other:  chlorhexidine 2% Cloths 1 Application(s) Topical <User Schedule>  levothyroxine Injectable 38 MICROGram(s) IV Push at bedtime  pantoprazole  Injectable 40 milliGRAM(s) IV Push daily    albuterol/ipratropium for Nebulization 3 milliLiter(s) Nebulizer every 6 hours  cefepime   IVPB 1000 milliGRAM(s) IV Intermittent every 24 hours  chlorhexidine 2% Cloths 1 Application(s) Topical <User Schedule>  levothyroxine Injectable 38 MICROGram(s) IV Push at bedtime  pantoprazole  Injectable 40 milliGRAM(s) IV Push daily    Drug Dosing Weight    Weight (kg): 63.458 (2023 17:52)    CENTRAL LINE: [ ] YES [ ] NO  LOCATION:   DATE INSERTED:  REMOVE: [ ] YES [ ] NO  EXPLAIN:    ALVAREZ: [ ] YES [ ] NO    DATE INSERTED:  REMOVE:  [ ] YES [ ] NO  EXPLAIN:    A-LINE:  [ ] YES [ ] NO  LOCATION:   DATE INSERTED:  REMOVE:  [ ] YES [ ] NO  EXPLAIN:    PMH -reviewed admission note, no change since admission  PAST MEDICAL & SURGICAL HISTORY:  Asthma with acute exacerbation, unspecified asthma severity      Essential hypertension      Hypothyroid      GERD (gastroesophageal reflux disease)      Constipation      Fe deficiency anemia      History of arthroplasty of right hip          ICU Vital Signs Last 24 Hrs  T(C): 36.2 (2023 04:00), Max: 37 (2023 17:52)  T(F): 97.1 (2023 04:00), Max: 98.6 (2023 17:52)  HR: 124 (2023 08:00) (85 - 124)  BP: 107/31 (2023 08:00) (96/32 - 125/42)  BP(mean): 49 (2023 08:00) (48 - 71)  ABP: --  ABP(mean): --  RR: 16 (2023 08:00) (15 - 27)  SpO2: 98% (2023 08:00) (90% - 100%)    O2 Parameters below as of 2023 07:00  Patient On (Oxygen Delivery Method): nasal cannula  O2 Flow (L/min): 3          ABG - ( 2023 03:49 )  pH, Arterial: 7.25  pH, Blood: x     /  pCO2: 49    /  pO2: 97    / HCO3: 22    / Base Excess: -6.0  /  SaO2: 98                    06 @ 07:01  -  06-02 @ 07:00  --------------------------------------------------------  IN: 1400 mL / OUT: 3600 mL / NET: -2200 mL            PHYSICAL EXAM:    GENERAL: NAD, well-groomed, well-developed  HEAD:  Atraumatic, Normocephalic  EYES: EOMI, PERRLA, conjunctiva and sclera clear  ENMT: No tonsillar erythema, exudates, or enlargement; Moist mucous membranes, Good dentition, No lesions  NECK: Supple, normal appearance, No JVD; Normal thyroid; Trachea midline  NERVOUS SYSTEM:  Alert & Oriented X3,  Motor Strength 5/5 B/L upper and lower extremities; DTRs 2+ intact and symmetric  CHEST/LUNG: No chest deformity; Normal percussion bilaterally; No rales, rhonchi, wheezing   HEART: Regular rate and rhythm; No murmurs, rubs, or gallops  ABDOMEN: Soft, Nontender, Nondistended; Bowel sounds present  EXTREMITIES:  2+ Peripheral Pulses, No clubbing, cyanosis, or edema  LYMPH: No lymphadenopathy noted  SKIN: No rashes or lesions;  Good capillary refill      LABS:  CBC Full  -  ( 2023 04:20 )  WBC Count : 13.12 K/uL  RBC Count : 3.43 M/uL  Hemoglobin : 11.0 g/dL  Hematocrit : 33.4 %  Platelet Count - Automated : 225 K/uL  Mean Cell Volume : 97.4 fl  Mean Cell Hemoglobin : 32.1 pg  Mean Cell Hemoglobin Concentration : 32.9 gm/dL  Auto Neutrophil # : 11.36 K/uL  Auto Lymphocyte # : 0.39 K/uL  Auto Monocyte # : 1.13 K/uL  Auto Eosinophil # : 0.04 K/uL  Auto Basophil # : 0.03 K/uL  Auto Neutrophil % : 86.6 %  Auto Lymphocyte % : 3.0 %  Auto Monocyte % : 8.6 %  Auto Eosinophil % : 0.3 %  Auto Basophil % : 0.2 %        138  |  114<H>  |  121<H>  ----------------------------<  101<H>  5.2   |  17<L>  |  2.90<H>    Ca    8.4      2023 06:45  Phos  4.7       Mg     2.7         TPro  5.4<L>  /  Alb  1.9<L>  /  TBili  0.5  /  DBili  x   /  AST  31  /  ALT  8<L>  /  AlkPhos  145<H>      PT/INR - ( 2023 21:20 )   PT: 13.0 sec;   INR: 1.09 ratio         PTT - ( 2023 19:00 )  PTT:30.9 sec  Urinalysis Basic - ( 2023 20:55 )    Color: Yellow / Appearance: Slightly Turbid / S.015 / pH: x  Gluc: x / Ketone: Negative  / Bili: Negative / Urobili: Negative   Blood: x / Protein: 30 mg/dL / Nitrite: Negative   Leuk Esterase: Moderate / RBC: 10-25 /HPF / WBC >50 /HPF   Sq Epi: x / Non Sq Epi: x / Bacteria: Moderate /HPF          RADIOLOGY & ADDITIONAL STUDIES REVIEWED:  ***    [ ]GOALS OF CARE DISCUSSION WITH PATIENT/FAMILY/PROXY:    CRITICAL CARE TIME SPENT: 35 minutes INTERVAL HPI/OVERNIGHT EVENTS: ***    PRESSORS: [ ] YES [ ] NO  WHICH:    Antimicrobial:  cefepime   IVPB 1000 milliGRAM(s) IV Intermittent every 24 hours    Cardiovascular:    Pulmonary:  albuterol/ipratropium for Nebulization 3 milliLiter(s) Nebulizer every 6 hours    Hematalogic:    Other:  chlorhexidine 2% Cloths 1 Application(s) Topical <User Schedule>  levothyroxine Injectable 38 MICROGram(s) IV Push at bedtime  pantoprazole  Injectable 40 milliGRAM(s) IV Push daily    albuterol/ipratropium for Nebulization 3 milliLiter(s) Nebulizer every 6 hours  cefepime   IVPB 1000 milliGRAM(s) IV Intermittent every 24 hours  chlorhexidine 2% Cloths 1 Application(s) Topical <User Schedule>  levothyroxine Injectable 38 MICROGram(s) IV Push at bedtime  pantoprazole  Injectable 40 milliGRAM(s) IV Push daily    Drug Dosing Weight    Weight (kg): 63.458 (2023 17:52)    CENTRAL LINE: [ ] YES [ ] NO  LOCATION:   DATE INSERTED:  REMOVE: [ ] YES [ ] NO  EXPLAIN:    ALVAREZ: [ ] YES [ ] NO    DATE INSERTED:  REMOVE:  [ ] YES [ ] NO  EXPLAIN:    A-LINE:  [ ] YES [ ] NO  LOCATION:   DATE INSERTED:  REMOVE:  [ ] YES [ ] NO  EXPLAIN:    PMH -reviewed admission note, no change since admission  PAST MEDICAL & SURGICAL HISTORY:  Asthma with acute exacerbation, unspecified asthma severity      Essential hypertension      Hypothyroid      GERD (gastroesophageal reflux disease)      Constipation      Fe deficiency anemia      History of arthroplasty of right hip          ICU Vital Signs Last 24 Hrs  T(C): 36.2 (2023 04:00), Max: 37 (2023 17:52)  T(F): 97.1 (2023 04:00), Max: 98.6 (2023 17:52)  HR: 124 (2023 08:00) (85 - 124)  BP: 107/31 (2023 08:00) (96/32 - 125/42)  BP(mean): 49 (2023 08:00) (48 - 71)  ABP: --  ABP(mean): --  RR: 16 (2023 08:00) (15 - 27)  SpO2: 98% (2023 08:00) (90% - 100%)    O2 Parameters below as of 2023 07:00  Patient On (Oxygen Delivery Method): nasal cannula  O2 Flow (L/min): 3          ABG - ( 2023 03:49 )  pH, Arterial: 7.25  pH, Blood: x     /  pCO2: 49    /  pO2: 97    / HCO3: 22    / Base Excess: -6.0  /  SaO2: 98                    06- @ 07:01  -  06-02 @ 07:00  --------------------------------------------------------  IN: 1400 mL / OUT: 3600 mL / NET: -2200 mL            PHYSICAL EXAM:    PHYSICAL EXAMINATION:  GENERAL: confused appearing female, on 3L NC sat 96%  HEAD:  Atraumatic, Normocephalic  EYES:  conjunctiva and sclera clear  NECK: Supple, No JVD  CHEST/LUNG: CTAB No rales, rhonchi, wheezing, or rubs  HEART: tachy rate and reg rhythm; No murmurs, rubs, or gallops  ABDOMEN: Soft, Nontender, Nondistended; Bowel sounds present  NERVOUS SYSTEM:  Alert & Oriented X0, not responding to commands. pupils PERRL, CN 2-12 grossly intact. Moving all extremities willfully and equally. Strength 5/5 in all four extremities.  EXTREMITIES:  2+ Peripheral Pulses, No clubbing, cyanosis, or edema  SKIN: warm dry - no decub ulcer over back. + RIGHT hip incision site CDI dressing.    LABS:  CBC Full  -  ( 2023 04:20 )  WBC Count : 13.12 K/uL  RBC Count : 3.43 M/uL  Hemoglobin : 11.0 g/dL  Hematocrit : 33.4 %  Platelet Count - Automated : 225 K/uL  Mean Cell Volume : 97.4 fl  Mean Cell Hemoglobin : 32.1 pg  Mean Cell Hemoglobin Concentration : 32.9 gm/dL  Auto Neutrophil # : 11.36 K/uL  Auto Lymphocyte # : 0.39 K/uL  Auto Monocyte # : 1.13 K/uL  Auto Eosinophil # : 0.04 K/uL  Auto Basophil # : 0.03 K/uL  Auto Neutrophil % : 86.6 %  Auto Lymphocyte % : 3.0 %  Auto Monocyte % : 8.6 %  Auto Eosinophil % : 0.3 %  Auto Basophil % : 0.2 %        138  |  114<H>  |  121<H>  ----------------------------<  101<H>  5.2   |  17<L>  |  2.90<H>    Ca    8.4      2023 06:45  Phos  4.7       Mg     2.7         TPro  5.4<L>  /  Alb  1.9<L>  /  TBili  0.5  /  DBili  x   /  AST  31  /  ALT  8<L>  /  AlkPhos  145<H>      PT/INR - ( 2023 21:20 )   PT: 13.0 sec;   INR: 1.09 ratio         PTT - ( 2023 19:00 )  PTT:30.9 sec  Urinalysis Basic - ( 2023 20:55 )    Color: Yellow / Appearance: Slightly Turbid / S.015 / pH: x  Gluc: x / Ketone: Negative  / Bili: Negative / Urobili: Negative   Blood: x / Protein: 30 mg/dL / Nitrite: Negative   Leuk Esterase: Moderate / RBC: 10-25 /HPF / WBC >50 /HPF   Sq Epi: x / Non Sq Epi: x / Bacteria: Moderate /HPF          RADIOLOGY & ADDITIONAL STUDIES REVIEWED:  ***    [ ]GOALS OF CARE DISCUSSION WITH PATIENT/FAMILY/PROXY:    CRITICAL CARE TIME SPENT: 35 minutes INTERVAL HPI/OVERNIGHT EVENTS: No acute overnight events. Pt seen at bedside, AAOx0, lethargic, appears to be in pain.     PRESSORS: [ ] YES [x] NO  WHICH:    Antimicrobial:  cefepime   IVPB 1000 milliGRAM(s) IV Intermittent every 24 hours    Cardiovascular:    Pulmonary:  albuterol/ipratropium for Nebulization 3 milliLiter(s) Nebulizer every 6 hours    Hematalogic:    Other:  chlorhexidine 2% Cloths 1 Application(s) Topical <User Schedule>  levothyroxine Injectable 38 MICROGram(s) IV Push at bedtime  pantoprazole  Injectable 40 milliGRAM(s) IV Push daily    albuterol/ipratropium for Nebulization 3 milliLiter(s) Nebulizer every 6 hours  cefepime   IVPB 1000 milliGRAM(s) IV Intermittent every 24 hours  chlorhexidine 2% Cloths 1 Application(s) Topical <User Schedule>  levothyroxine Injectable 38 MICROGram(s) IV Push at bedtime  pantoprazole  Injectable 40 milliGRAM(s) IV Push daily    Drug Dosing Weight    Weight (kg): 63.458 (2023 17:52)    CENTRAL LINE: [ ] YES [ ] NO  LOCATION:   DATE INSERTED:  REMOVE: [ ] YES [ ] NO  EXPLAIN:    ALVAREZ: [ ] YES [ ] NO    DATE INSERTED:  REMOVE:  [ ] YES [ ] NO  EXPLAIN:    A-LINE:  [ ] YES [ ] NO  LOCATION:   DATE INSERTED:  REMOVE:  [ ] YES [ ] NO  EXPLAIN:    PMH -reviewed admission note, no change since admission  PAST MEDICAL & SURGICAL HISTORY:  Asthma with acute exacerbation, unspecified asthma severity      Essential hypertension      Hypothyroid      GERD (gastroesophageal reflux disease)      Constipation      Fe deficiency anemia      History of arthroplasty of right hip          ICU Vital Signs Last 24 Hrs  T(C): 36.2 (2023 04:00), Max: 37 (2023 17:52)  T(F): 97.1 (2023 04:00), Max: 98.6 (2023 17:52)  HR: 124 (2023 08:00) (85 - 124)  BP: 107/31 (2023 08:00) (96/32 - 125/42)  BP(mean): 49 (2023 08:00) (48 - 71)  ABP: --  ABP(mean): --  RR: 16 (2023 08:00) (15 - 27)  SpO2: 98% (2023 08:00) (90% - 100%)    O2 Parameters below as of 2023 07:00  Patient On (Oxygen Delivery Method): nasal cannula  O2 Flow (L/min): 3          ABG - ( 2023 03:49 )  pH, Arterial: 7.25  pH, Blood: x     /  pCO2: 49    /  pO2: 97    / HCO3: 22    / Base Excess: -6.0  /  SaO2: 98                    06- @ 07:01  -  06-02 @ 07:00  --------------------------------------------------------  IN: 1400 mL / OUT: 3600 mL / NET: -2200 mL            PHYSICAL EXAM:    PHYSICAL EXAMINATION:  GENERAL: confused appearing female, on 3L NC sat 96%  HEAD:  Atraumatic, Normocephalic  EYES:  conjunctiva and sclera clear  NECK: Supple, No JVD  CHEST/LUNG: CTAB No rales, rhonchi, wheezing, or rubs  HEART: tachy rate and reg rhythm; No murmurs, rubs, or gallops  ABDOMEN: Soft, Nontender, Nondistended; Bowel sounds present  NERVOUS SYSTEM:  Alert & Oriented X0, not responding to commands. pupils PERRL, CN 2-12 grossly intact. Moving all extremities willfully and equally. Strength 5/5 in all four extremities.  EXTREMITIES:  2+ Peripheral Pulses, No clubbing, cyanosis, or edema  SKIN: warm dry - no decub ulcer over back. + RIGHT hip incision site CDI dressing.    LABS:  CBC Full  -  ( 2023 04:20 )  WBC Count : 13.12 K/uL  RBC Count : 3.43 M/uL  Hemoglobin : 11.0 g/dL  Hematocrit : 33.4 %  Platelet Count - Automated : 225 K/uL  Mean Cell Volume : 97.4 fl  Mean Cell Hemoglobin : 32.1 pg  Mean Cell Hemoglobin Concentration : 32.9 gm/dL  Auto Neutrophil # : 11.36 K/uL  Auto Lymphocyte # : 0.39 K/uL  Auto Monocyte # : 1.13 K/uL  Auto Eosinophil # : 0.04 K/uL  Auto Basophil # : 0.03 K/uL  Auto Neutrophil % : 86.6 %  Auto Lymphocyte % : 3.0 %  Auto Monocyte % : 8.6 %  Auto Eosinophil % : 0.3 %  Auto Basophil % : 0.2 %        138  |  114<H>  |  121<H>  ----------------------------<  101<H>  5.2   |  17<L>  |  2.90<H>    Ca    8.4      2023 06:45  Phos  4.7       Mg     2.7         TPro  5.4<L>  /  Alb  1.9<L>  /  TBili  0.5  /  DBili  x   /  AST  31  /  ALT  8<L>  /  AlkPhos  145<H>      PT/INR - ( 2023 21:20 )   PT: 13.0 sec;   INR: 1.09 ratio         PTT - ( 2023 19:00 )  PTT:30.9 sec  Urinalysis Basic - ( 2023 20:55 )    Color: Yellow / Appearance: Slightly Turbid / S.015 / pH: x  Gluc: x / Ketone: Negative  / Bili: Negative / Urobili: Negative   Blood: x / Protein: 30 mg/dL / Nitrite: Negative   Leuk Esterase: Moderate / RBC: 10-25 /HPF / WBC >50 /HPF   Sq Epi: x / Non Sq Epi: x / Bacteria: Moderate /HPF          RADIOLOGY & ADDITIONAL STUDIES REVIEWED:  ***    [ ]GOALS OF CARE DISCUSSION WITH PATIENT/FAMILY/PROXY:    CRITICAL CARE TIME SPENT: 35 minutes

## 2023-06-03 LAB
ALBUMIN SERPL ELPH-MCNC: 1.9 G/DL — LOW (ref 3.5–5)
ALP SERPL-CCNC: 128 U/L — HIGH (ref 40–120)
ALT FLD-CCNC: 21 U/L DA — SIGNIFICANT CHANGE UP (ref 10–60)
ANION GAP SERPL CALC-SCNC: 1 MMOL/L — LOW (ref 5–17)
ANION GAP SERPL CALC-SCNC: 3 MMOL/L — LOW (ref 5–17)
AST SERPL-CCNC: 67 U/L — HIGH (ref 10–40)
BASOPHILS # BLD AUTO: 0.02 K/UL — SIGNIFICANT CHANGE UP (ref 0–0.2)
BASOPHILS NFR BLD AUTO: 0.2 % — SIGNIFICANT CHANGE UP (ref 0–2)
BILIRUB SERPL-MCNC: 0.5 MG/DL — SIGNIFICANT CHANGE UP (ref 0.2–1.2)
BUN SERPL-MCNC: 24 MG/DL — HIGH (ref 7–18)
BUN SERPL-MCNC: 32 MG/DL — HIGH (ref 7–18)
CALCIUM SERPL-MCNC: 7.8 MG/DL — LOW (ref 8.4–10.5)
CALCIUM SERPL-MCNC: 8.4 MG/DL — SIGNIFICANT CHANGE UP (ref 8.4–10.5)
CHLORIDE SERPL-SCNC: 119 MMOL/L — HIGH (ref 96–108)
CHLORIDE SERPL-SCNC: 120 MMOL/L — HIGH (ref 96–108)
CO2 SERPL-SCNC: 26 MMOL/L — SIGNIFICANT CHANGE UP (ref 22–31)
CO2 SERPL-SCNC: 28 MMOL/L — SIGNIFICANT CHANGE UP (ref 22–31)
CREAT SERPL-MCNC: 0.26 MG/DL — LOW (ref 0.5–1.3)
CREAT SERPL-MCNC: 0.31 MG/DL — LOW (ref 0.5–1.3)
CULTURE RESULTS: NO GROWTH — SIGNIFICANT CHANGE UP
EGFR: 106 ML/MIN/1.73M2 — SIGNIFICANT CHANGE UP
EGFR: 110 ML/MIN/1.73M2 — SIGNIFICANT CHANGE UP
EOSINOPHIL # BLD AUTO: 0.11 K/UL — SIGNIFICANT CHANGE UP (ref 0–0.5)
EOSINOPHIL NFR BLD AUTO: 1 % — SIGNIFICANT CHANGE UP (ref 0–6)
GLUCOSE SERPL-MCNC: 118 MG/DL — HIGH (ref 70–99)
GLUCOSE SERPL-MCNC: 99 MG/DL — SIGNIFICANT CHANGE UP (ref 70–99)
HCT VFR BLD CALC: 30.4 % — LOW (ref 34.5–45)
HGB BLD-MCNC: 10.1 G/DL — LOW (ref 11.5–15.5)
IMM GRANULOCYTES NFR BLD AUTO: 2.4 % — HIGH (ref 0–0.9)
LYMPHOCYTES # BLD AUTO: 0.52 K/UL — LOW (ref 1–3.3)
LYMPHOCYTES # BLD AUTO: 4.6 % — LOW (ref 13–44)
MAGNESIUM SERPL-MCNC: 1.8 MG/DL — SIGNIFICANT CHANGE UP (ref 1.6–2.6)
MAGNESIUM SERPL-MCNC: 1.9 MG/DL — SIGNIFICANT CHANGE UP (ref 1.6–2.6)
MCHC RBC-ENTMCNC: 32.5 PG — SIGNIFICANT CHANGE UP (ref 27–34)
MCHC RBC-ENTMCNC: 33.2 GM/DL — SIGNIFICANT CHANGE UP (ref 32–36)
MCV RBC AUTO: 97.7 FL — SIGNIFICANT CHANGE UP (ref 80–100)
MONOCYTES # BLD AUTO: 1.04 K/UL — HIGH (ref 0–0.9)
MONOCYTES NFR BLD AUTO: 9.1 % — SIGNIFICANT CHANGE UP (ref 2–14)
MRSA PCR RESULT.: SIGNIFICANT CHANGE UP
NEUTROPHILS # BLD AUTO: 9.44 K/UL — HIGH (ref 1.8–7.4)
NEUTROPHILS NFR BLD AUTO: 82.7 % — HIGH (ref 43–77)
NRBC # BLD: 0 /100 WBCS — SIGNIFICANT CHANGE UP (ref 0–0)
PHOSPHATE SERPL-MCNC: 1.2 MG/DL — LOW (ref 2.5–4.5)
PHOSPHATE SERPL-MCNC: 3.1 MG/DL — SIGNIFICANT CHANGE UP (ref 2.5–4.5)
PLATELET # BLD AUTO: 241 K/UL — SIGNIFICANT CHANGE UP (ref 150–400)
POTASSIUM SERPL-MCNC: 3.8 MMOL/L — SIGNIFICANT CHANGE UP (ref 3.5–5.3)
POTASSIUM SERPL-MCNC: 4.4 MMOL/L — SIGNIFICANT CHANGE UP (ref 3.5–5.3)
POTASSIUM SERPL-SCNC: 3.8 MMOL/L — SIGNIFICANT CHANGE UP (ref 3.5–5.3)
POTASSIUM SERPL-SCNC: 4.4 MMOL/L — SIGNIFICANT CHANGE UP (ref 3.5–5.3)
PROT SERPL-MCNC: 5.2 G/DL — LOW (ref 6–8.3)
RBC # BLD: 3.11 M/UL — LOW (ref 3.8–5.2)
RBC # FLD: 12.7 % — SIGNIFICANT CHANGE UP (ref 10.3–14.5)
S AUREUS DNA NOSE QL NAA+PROBE: SIGNIFICANT CHANGE UP
SODIUM SERPL-SCNC: 148 MMOL/L — HIGH (ref 135–145)
SODIUM SERPL-SCNC: 149 MMOL/L — HIGH (ref 135–145)
SPECIMEN SOURCE: SIGNIFICANT CHANGE UP
WBC # BLD: 11.19 K/UL — HIGH (ref 3.8–10.5)
WBC # FLD AUTO: 11.19 K/UL — HIGH (ref 3.8–10.5)

## 2023-06-03 RX ORDER — METOPROLOL TARTRATE 50 MG
5 TABLET ORAL EVERY 6 HOURS
Refills: 0 | Status: DISCONTINUED | OUTPATIENT
Start: 2023-06-03 | End: 2023-06-04

## 2023-06-03 RX ORDER — SODIUM CHLORIDE 9 MG/ML
1000 INJECTION, SOLUTION INTRAVENOUS
Refills: 0 | Status: DISCONTINUED | OUTPATIENT
Start: 2023-06-03 | End: 2023-06-04

## 2023-06-03 RX ORDER — ACETAMINOPHEN 500 MG
1000 TABLET ORAL ONCE
Refills: 0 | Status: COMPLETED | OUTPATIENT
Start: 2023-06-03 | End: 2023-06-03

## 2023-06-03 RX ORDER — CEFEPIME 1 G/1
1000 INJECTION, POWDER, FOR SOLUTION INTRAMUSCULAR; INTRAVENOUS EVERY 12 HOURS
Refills: 0 | Status: DISCONTINUED | OUTPATIENT
Start: 2023-06-03 | End: 2023-06-11

## 2023-06-03 RX ORDER — POTASSIUM PHOSPHATE, MONOBASIC POTASSIUM PHOSPHATE, DIBASIC 236; 224 MG/ML; MG/ML
30 INJECTION, SOLUTION INTRAVENOUS ONCE
Refills: 0 | Status: COMPLETED | OUTPATIENT
Start: 2023-06-03 | End: 2023-06-03

## 2023-06-03 RX ADMIN — Medication 1000 MILLIGRAM(S): at 08:45

## 2023-06-03 RX ADMIN — CEFEPIME 100 MILLIGRAM(S): 1 INJECTION, POWDER, FOR SOLUTION INTRAMUSCULAR; INTRAVENOUS at 20:53

## 2023-06-03 RX ADMIN — Medication 3 MILLILITER(S): at 20:02

## 2023-06-03 RX ADMIN — SODIUM CHLORIDE 50 MILLILITER(S): 9 INJECTION, SOLUTION INTRAVENOUS at 08:15

## 2023-06-03 RX ADMIN — Medication 38 MICROGRAM(S): at 22:43

## 2023-06-03 RX ADMIN — SODIUM CHLORIDE 100 MILLILITER(S): 9 INJECTION, SOLUTION INTRAVENOUS at 05:39

## 2023-06-03 RX ADMIN — Medication 1000 MILLIGRAM(S): at 21:57

## 2023-06-03 RX ADMIN — Medication 400 MILLIGRAM(S): at 08:15

## 2023-06-03 RX ADMIN — HEPARIN SODIUM 5000 UNIT(S): 5000 INJECTION INTRAVENOUS; SUBCUTANEOUS at 21:20

## 2023-06-03 RX ADMIN — HEPARIN SODIUM 5000 UNIT(S): 5000 INJECTION INTRAVENOUS; SUBCUTANEOUS at 05:38

## 2023-06-03 RX ADMIN — Medication 3 MILLILITER(S): at 08:34

## 2023-06-03 RX ADMIN — HEPARIN SODIUM 5000 UNIT(S): 5000 INJECTION INTRAVENOUS; SUBCUTANEOUS at 14:05

## 2023-06-03 RX ADMIN — Medication 400 MILLIGRAM(S): at 21:20

## 2023-06-03 RX ADMIN — PANTOPRAZOLE SODIUM 40 MILLIGRAM(S): 20 TABLET, DELAYED RELEASE ORAL at 12:18

## 2023-06-03 RX ADMIN — Medication 3 MILLILITER(S): at 14:49

## 2023-06-03 RX ADMIN — POTASSIUM PHOSPHATE, MONOBASIC POTASSIUM PHOSPHATE, DIBASIC 83.33 MILLIMOLE(S): 236; 224 INJECTION, SOLUTION INTRAVENOUS at 09:15

## 2023-06-03 RX ADMIN — Medication 5 MILLIGRAM(S): at 12:18

## 2023-06-03 RX ADMIN — Medication 5 MILLIGRAM(S): at 23:36

## 2023-06-03 RX ADMIN — SODIUM CHLORIDE 100 MILLILITER(S): 9 INJECTION, SOLUTION INTRAVENOUS at 00:25

## 2023-06-03 RX ADMIN — CHLORHEXIDINE GLUCONATE 1 APPLICATION(S): 213 SOLUTION TOPICAL at 05:38

## 2023-06-03 RX ADMIN — Medication 5 MILLIGRAM(S): at 17:43

## 2023-06-03 NOTE — PROGRESS NOTE ADULT - ASSESSMENT
Patient is a 83yo Female with Asthma, Essential HTN, GERD, constipation, Iron def Anemia, Hypothyroidism and recent Rt  hip replacement p/w  AMS and hypoxia. Pt a/w CHIP and hyperkalemia with b/l hydronephrosis s/p benavides placement in ER with bipin pus. Nephrology consulted for Elevated serum creatinine.    1. CHIP- CHIP 2/2 Obstructive uropathy now resolved. CT abd/pelvis with distended bladder and mild b/l hydro. s/p benavides placement. Now with post obstructive diuresis. Recc to change IVF to 1/2 NS @ 70 ml/hour given tachycardia, polyuria and NPO status. Recc to repeat renal US in 2-3 days to monitor for resolution. Monitor K/Mg/Phos daily; replete prn. Strict I/Os. Avoid nephrotoxins/ NSAIDs/ RCA. Monitor BMP.    2. Obstructive uropathy. See above.     3. Hyperkalemia- resolved.     4. Sepsis 2/2 UTI- +UA, Pt on Cefepime. UCx and BCx pending    [facility] NEPHROLOG[facility]  Chaka Brooks M.D.  Ildefonso Gonzalez D.O.  Dana Kent M.D.  Jayne Stratton, MSN, ANP-C  [phone]  Fax: [phone] [place] [address], [id number]  [place] [address]

## 2023-06-03 NOTE — PROGRESS NOTE ADULT - ASSESSMENT
UTI - urine culture negative?  Leukocytosis - mild  Sepsis     Plan - Change Maxipime to 1gm iv q12hrs  Time spent - 30 mins

## 2023-06-03 NOTE — DIETITIAN INITIAL EVALUATION ADULT - FACTORS AFF FOOD INTAKE
acute on chronic comorbidities including acute metabolic encephalopathy, CHIP, recent surgery for hip replacement/change in mental status

## 2023-06-03 NOTE — DIETITIAN INITIAL EVALUATION ADULT - PERTINENT LABORATORY DATA
06-03    149<H>  |  120<H>  |  32<H>  ----------------------------<  99  3.8   |  26  |  0.26<L>    Ca    8.4      03 Jun 2023 05:56  Phos  1.2     06-03  Mg     1.9     06-03    TPro  5.2<L>  /  Alb  1.9<L>  /  TBili  0.5  /  DBili  x   /  AST  67<H>  /  ALT  21  /  AlkPhos  128<H>  06-03  POCT Blood Glucose.: 84 mg/dL (06-03-23 @ 05:57)

## 2023-06-03 NOTE — PROGRESS NOTE ADULT - SUBJECTIVE AND OBJECTIVE BOX
INTERVAL HPI/OVERNIGHT EVENTS:   Patient seen at the bedside. Overnight she remained tachy 120-130. Gave 500cc bolus with no improvement. Patient is on toprol 25 at [facility], tele showing sinus tachy gave lopressor 2.5mg x 1 with IV tylenol with moderate improvement in tachycardia down to 100-110s. Gave an additional 500cc bolus for U/O 100-200/h.     PRESSORS: [X] NO    Antimicrobial:  cefepime   IVPB 1000 milliGRAM(s) IV Intermittent every 24 hours    Cardiovascular:    Pulmonary:  albuterol/ipratropium for Nebulization 3 milliLiter(s) Nebulizer every 6 hours    Hematalogic:  heparin   Injectable 5000 Unit(s) SubCutaneous every 8 hours    Other:  acetaminophen   IVPB .. 1000 milliGRAM(s) IV Intermittent once  chlorhexidine 2% Cloths 1 Application(s) Topical <User Schedule>  levothyroxine Injectable 38 MICROGram(s) IV Push at bedtime  pantoprazole  Injectable 40 milliGRAM(s) IV Push daily  sodium chloride 0.45%. 1000 milliLiter(s) IV Continuous <Continuous>    acetaminophen   IVPB .. 1000 milliGRAM(s) IV Intermittent once  albuterol/ipratropium for Nebulization 3 milliLiter(s) Nebulizer every 6 hours  cefepime   IVPB 1000 milliGRAM(s) IV Intermittent every 24 hours  chlorhexidine 2% Cloths 1 Application(s) Topical <User Schedule>  heparin   Injectable 5000 Unit(s) SubCutaneous every 8 hours  levothyroxine Injectable 38 MICROGram(s) IV Push at bedtime  pantoprazole  Injectable 40 milliGRAM(s) IV Push daily  sodium chloride 0.45%. 1000 milliLiter(s) IV Continuous <Continuous>    Drug Dosing Weight    Weight (kg): 63.458 (2023 17:52)    CENTRAL LINE: [X] NO          ALVAREZ: [X] YES        A-LINE: [X] NO          ICU Vital Signs Last 24 Hrs  T(C): 36.1 (2023 00:15), Max: 36.4 (2023 17:00)  T(F): 96.9 (2023 00:15), Max: 97.5 (2023 17:00)  HR: 122 (2023 04:00) (85 - 152)  BP: 129/52 (2023 04:00) (107/31 - 157/51)  BP(mean): 71 (2023 04:00) (49 - 83)  ABP: --  ABP(mean): --  RR: 15 (2023 04:00) (15 - 34)  SpO2: 92% (2023 04:00) (91% - 100%)    O2 Parameters below as of 2023 22:00  Patient On (Oxygen Delivery Method): nasal cannula  O2 Flow (L/min): 2          ABG - ( 2023 03:49 )  pH, Arterial: 7.25  pH, Blood: x     /  pCO2: 49    /  pO2: 97    / HCO3: 22    / Base Excess: -6.0  /  SaO2: 98                    06- @ 07:01  -  06-02 @ 07:00  --------------------------------------------------------  IN: 1400 mL / OUT: 3600 mL / NET: -2200 mL                PHYSICAL EXAM:    PHYSICAL EXAMINATION:  GENERAL: confused appearing female, on 3L NC sat 96%  HEAD:  Atraumatic, Normocephalic  EYES:  conjunctiva and sclera clear  NECK: Supple, No JVD  CHEST/LUNG: CTAB No rales, rhonchi, wheezing, or rubs  HEART: tachy rate and reg rhythm; No murmurs, rubs, or gallops  ABDOMEN: Soft, Nontender, Nondistended; Bowel sounds present  NERVOUS SYSTEM:  Alert & Oriented X0, not responding to commands. pupils PERRL, CN 2-12 grossly intact. Moving all extremities willfully and equally. Strength 5/5 in all four extremities.  EXTREMITIES:  2+ Peripheral Pulses, No clubbing, cyanosis, or edema  SKIN: warm dry - no decub ulcer over back. + RIGHT hip incision site CDI dressing.        LABS:  CBC Full  -  ( 2023 04:20 )  WBC Count : 13.12 K/uL  RBC Count : 3.43 M/uL  Hemoglobin : 11.0 g/dL  Hematocrit : 33.4 %  Platelet Count - Automated : 225 K/uL  Mean Cell Volume : 97.4 fl  Mean Cell Hemoglobin : 32.1 pg  Mean Cell Hemoglobin Concentration : 32.9 gm/dL  Auto Neutrophil # : 11.36 K/uL  Auto Lymphocyte # : 0.39 K/uL  Auto Monocyte # : 1.13 K/uL  Auto Eosinophil # : 0.04 K/uL  Auto Basophil # : 0.03 K/uL  Auto Neutrophil % : 86.6 %  Auto Lymphocyte % : 3.0 %  Auto Monocyte % : 8.6 %  Auto Eosinophil % : 0.3 %  Auto Basophil % : 0.2 %        146<H>  |  119<H>  |  82<H>  ----------------------------<  115<H>  4.3   |  25  |  1.15    Ca    8.6      2023 16:30  Phos  2.5       Mg     2.4         TPro  5.4<L>  /  Alb  1.9<L>  /  TBili  0.5  /  DBili  x   /  AST  31  /  ALT  8<L>  /  AlkPhos  145<H>      PT/INR - ( 2023 21:20 )   PT: 13.0 sec;   INR: 1.09 ratio         PTT - ( 2023 19:00 )  PTT:30.9 sec  Urinalysis Basic - ( 2023 20:55 )    Color: Yellow / Appearance: Slightly Turbid / S.015 / pH: x  Gluc: x / Ketone: Negative  / Bili: Negative / Urobili: Negative   Blood: x / Protein: 30 mg/dL / Nitrite: Negative   Leuk Esterase: Moderate / RBC: 10-25 /HPF / WBC >50 /HPF   Sq Epi: x / Non Sq Epi: x / Bacteria: Moderate /HPF      Culture Results:   No growth to date. ( @ 18:50)  Culture Results:   No growth to date. ( @ 18:45)        CRITICAL CARE TIME SPENT: 35 minutes   INTERVAL HPI/OVERNIGHT EVENTS:   Patient seen at the bedside. Overnight she remained tachy 120-130. Gave 500cc bolus with no improvement. Patient is on toprol 25 at [facility], tele showing sinus tachy gave lopressor 2.5mg x 1 with IV tylenol with moderate improvement in tachycardia down to 100-110s. Gave an additional 500cc bolus for U/O 100-200/h. Pt seen at bedside this morning, more awake and alert, but still lethargic. Pt stating she has abdominal pain and right hip pain on palpation.     PRESSORS: [X] NO    Antimicrobial:  cefepime   IVPB 1000 milliGRAM(s) IV Intermittent every 24 hours    Cardiovascular:    Pulmonary:  albuterol/ipratropium for Nebulization 3 milliLiter(s) Nebulizer every 6 hours    Hematalogic:  heparin   Injectable 5000 Unit(s) SubCutaneous every 8 hours    Other:  acetaminophen   IVPB .. 1000 milliGRAM(s) IV Intermittent once  chlorhexidine 2% Cloths 1 Application(s) Topical <User Schedule>  levothyroxine Injectable 38 MICROGram(s) IV Push at bedtime  pantoprazole  Injectable 40 milliGRAM(s) IV Push daily  sodium chloride 0.45%. 1000 milliLiter(s) IV Continuous <Continuous>    acetaminophen   IVPB .. 1000 milliGRAM(s) IV Intermittent once  albuterol/ipratropium for Nebulization 3 milliLiter(s) Nebulizer every 6 hours  cefepime   IVPB 1000 milliGRAM(s) IV Intermittent every 24 hours  chlorhexidine 2% Cloths 1 Application(s) Topical <User Schedule>  heparin   Injectable 5000 Unit(s) SubCutaneous every 8 hours  levothyroxine Injectable 38 MICROGram(s) IV Push at bedtime  pantoprazole  Injectable 40 milliGRAM(s) IV Push daily  sodium chloride 0.45%. 1000 milliLiter(s) IV Continuous <Continuous>    Drug Dosing Weight    Weight (kg): 63.458 (2023 17:52)    CENTRAL LINE: [X] NO          ALVAREZ: [X] YES        A-LINE: [X] NO          ICU Vital Signs Last 24 Hrs  T(C): 36.1 (2023 00:15), Max: 36.4 (2023 17:00)  T(F): 96.9 (2023 00:15), Max: 97.5 (2023 17:00)  HR: 122 (2023 04:00) (85 - 152)  BP: 129/52 (2023 04:00) (107/31 - 157/51)  BP(mean): 71 (2023 04:00) (49 - 83)  ABP: --  ABP(mean): --  RR: 15 (2023 04:00) (15 - 34)  SpO2: 92% (2023 04:00) (91% - 100%)    O2 Parameters below as of 2023 22:00  Patient On (Oxygen Delivery Method): nasal cannula  O2 Flow (L/min): 2          ABG - ( 2023 03:49 )  pH, Arterial: 7.25  pH, Blood: x     /  pCO2: 49    /  pO2: 97    / HCO3: 22    / Base Excess: -6.0  /  SaO2: 98                    06 @ 07:01  -  06-02 @ 07:00  --------------------------------------------------------  IN: 1400 mL / OUT: 3600 mL / NET: -2200 mL                PHYSICAL EXAM:    PHYSICAL EXAMINATION:  GENERAL: lethargic, elderly, on 2L NC sat 96%  HEAD:  Atraumatic, Normocephalic  EYES:  conjunctiva and sclera clear  NECK: Supple, No JVD  CHEST/LUNG: CTAB No rales, rhonchi, wheezing, or rubs  HEART: tachy rate and reg rhythm; No murmurs, rubs, or gallops  ABDOMEN: Soft, Nontender, Nondistended; Bowel sounds present  NERVOUS SYSTEM:  Alert & Oriented X0, not responding to commands. pupils PERRL, CN 2-12 grossly intact. Moving all extremities willfully and equally. Strength 5/5 in all four extremities.  EXTREMITIES:  2+ Peripheral Pulses, No clubbing, cyanosis, or edema  SKIN: warm dry - no decub ulcer over back. + RIGHT hip incision site CDI dressing.      LABS:  CBC Full  -  ( 2023 04:20 )  WBC Count : 13.12 K/uL  RBC Count : 3.43 M/uL  Hemoglobin : 11.0 g/dL  Hematocrit : 33.4 %  Platelet Count - Automated : 225 K/uL  Mean Cell Volume : 97.4 fl  Mean Cell Hemoglobin : 32.1 pg  Mean Cell Hemoglobin Concentration : 32.9 gm/dL  Auto Neutrophil # : 11.36 K/uL  Auto Lymphocyte # : 0.39 K/uL  Auto Monocyte # : 1.13 K/uL  Auto Eosinophil # : 0.04 K/uL  Auto Basophil # : 0.03 K/uL  Auto Neutrophil % : 86.6 %  Auto Lymphocyte % : 3.0 %  Auto Monocyte % : 8.6 %  Auto Eosinophil % : 0.3 %  Auto Basophil % : 0.2 %        146<H>  |  119<H>  |  82<H>  ----------------------------<  115<H>  4.3   |  25  |  1.15    Ca    8.6      2023 16:30  Phos  2.5       Mg     2.4         TPro  5.4<L>  /  Alb  1.9<L>  /  TBili  0.5  /  DBili  x   /  AST  31  /  ALT  8<L>  /  AlkPhos  145<H>      PT/INR - ( 2023 21:20 )   PT: 13.0 sec;   INR: 1.09 ratio         PTT - ( 2023 19:00 )  PTT:30.9 sec  Urinalysis Basic - ( 2023 20:55 )    Color: Yellow / Appearance: Slightly Turbid / S.015 / pH: x  Gluc: x / Ketone: Negative  / Bili: Negative / Urobili: Negative   Blood: x / Protein: 30 mg/dL / Nitrite: Negative   Leuk Esterase: Moderate / RBC: 10-25 /HPF / WBC >50 /HPF   Sq Epi: x / Non Sq Epi: x / Bacteria: Moderate /HPF      Culture Results:   No growth to date. ( @ 18:50)  Culture Results:   No growth to date. ( @ 18:45)        CRITICAL CARE TIME SPENT: 35 minutes

## 2023-06-03 NOTE — DIETITIAN INITIAL EVALUATION ADULT - EDUCATION DIETARY MODIFICATIONS
"              After Visit Summary   6/23/2017    Jamila Barrett    MRN: [id number]           Patient Information     Date Of Birth          1942        Visit Information        Provider Department      6/23/2017 9:40 AM Kyle Verde MD [facility]        Today's Diagnoses     Cholangitis    -  1    Dyspepsia        Essential hypertension, benign        Thrombocytopenia (H)           Follow-ups after your visit        Who to contact     Please call your clinic at [phone] to:    Ask questions about your health    Make or cancel appointments    Discuss your medicines    Learn about your test results    Speak to your doctor   If you have compliments or concerns about an experience at your clinic, or if you wish to file a complaint, please contact [facility] Patient Relations at [phone] or email us at [email or web address]         Additional Information About Your Visit        Care EveryWhere ID     This is your Care EveryWhere ID. This could be used by other organizations to access your [place] medical records  [id number]        Your Vitals Were     Pulse Temperature Respirations Height Pulse Oximetry BMI (Body Mass Index)    59 97.9  F (36.6  C) (Oral) 16 5' 2.5\" (158.8 cm) 97% 23.97 kg/m2       Blood Pressure from Last 3 Encounters:   06/23/17 136/84   06/14/17 139/80   04/20/17 136/81    Weight from Last 3 Encounters:   06/23/17 133 lb 3.2 oz (60.4 kg)   06/14/17 135 lb 6.4 oz (61.4 kg)   04/20/17 133 lb 9.6 oz (60.6 kg)              We Performed the Following     Comprehensive Metabolic Panel (LabDAQ)          Today's Medication Changes          These changes are accurate as of: 6/23/17 10:48 AM.  If you have any questions, ask your nurse or doctor.               Start taking these medicines.        Dose/Directions    LANsoprazole 30 MG CR capsule   Commonly known as:  PREVACID   Used for:  Dyspepsia   Started by:  Kyle Verde MD        Dose:  " 30 mg   Take 1 capsule (30 mg) by mouth daily   Quantity:  90 capsule   Refills:  1         Stop taking these medicines if you haven't already. Please contact your care team if you have questions.     Ca Carbonate-Mag Hydroxide 675-135 MG Chew   Commonly known as:  ANTACID EXTRA STRENGTH   Stopped by:  Kyle Verde MD           diclofenac 75 MG EC tablet   Commonly known as:  VOLTAREN   Stopped by:  Kyle Verde MD                Where to get your medicines      These medications were sent to [facility]/pharmacy #[id number] - [place], [place] - [address]  [address], [place] [address]    Hours:  24-hours Phone:  [phone]     LANsoprazole 30 MG CR capsule                Primary Care Provider Office Phone # Fax #    Karen Jordan Meyer [phone]       [facility] [address] [facility] [address]        Equal Access to Services     [facility]: Hadii aad ku hadasho Soomaali, waaxda luqadaha, qaybta kaalmada adeegyada, waxay idiin hayaan adekatie kharash lamin . So [facility] [phone].    ATENCIÓN: Si habla español, tiene a ramos disposición servicios gratuitos de asistencia lingüística. Misha al [phone].    We comply with applicable federal civil rights laws and [place] laws. We do not discriminate on the basis of race, color, national origin, age, disability sex, sexual orientation or gender identity.            Thank you!     Thank you for choosing [facility]  for your care. Our goal is always to provide you with excellent care. Hearing back from our patients is one way we can continue to improve our services. Please take a few minutes to complete the written survey that you may receive in the mail after your visit with us. Thank you!             Your Updated Medication List - Protect others around you: Learn how to safely use, store and throw away your medicines at www.disposemymeds.org.          This list is accurate as of: 6/23/17 10:48 AM.   Always use your most recent med list.                   Brand Name Dispense Instructions for use Diagnosis    allopurinol 300 MG tablet    ZYLOPRIM    90 tablet    Take 1 tablet (300 mg) by mouth daily    Chronic gout without tophus, unspecified cause, unspecified site       colchicine 0.6 MG tablet    COLCRYS    60 tablet    Take 1 tablet by mouth 2 times daily as needed    Chronic gout, unspecified cause, unspecified site       ENSURE Liqd     90 Can    Take 1 Bottle by mouth 2 times daily    Weight loss, abnormal       gabapentin 100 MG capsule    NEURONTIN    60 capsule    Take 1 capsule (100 mg) by mouth 2 times daily    Chronic bilateral thoracic back pain       LANsoprazole 30 MG CR capsule    PREVACID    90 capsule    Take 1 capsule (30 mg) by mouth daily    Dyspepsia       mirtazapine 15 MG tablet    REMERON    90 tablet    Take 1 tablet (15 mg) by mouth At Bedtime    Major depression, recurrent, chronic (H)       ranitidine 150 MG tablet    ZANTAC    60 tablet    Take 1 tablet (150 mg) by mouth 2 times daily           at present/not applicable

## 2023-06-03 NOTE — PROGRESS NOTE ADULT - ASSESSMENT
Assessment  81 yo F w/ Hx Asthma, primary HTN, GERD, constipation, Fe def Anemia, Hypothyroidism and recent hip replacement who was sent in from NH for AMS and hypoxia. Admit for acute renal failure w/ hyperkalemia 2/2 urinary obstruction and AHRF w/ c/f PE in setting of recent joint replacement.     PLAN:    -----------------CNS:------------------  #Acute metabolic encephalopathy  in setting of urine retention and uremia  c/f UTI  resolve as below  CT head w/out acute finding, unremarkable     -----------------CVS:------------------  #Primary HTN  HOLDING home meds, BP soft on admission  (amlodipine, metoprolol, losartan)    -----------------RESPIRATORY:--------  #AHRF  c/f PE?, does not appear to be in asthma exac, no COPD or HF hx  family reports patient is baseline on 2L oxygen   > now tolerating 3L  cont duonebs q6  f/u ABG  titrate O2 as tolerates,  no need for heparin drip at this time  f/u doppler b/l LE  d-dimer 1047      #Asthma  On albuterol at home  will cont duonebs    -----------------GI:----------------------  NPO for now while encephalopathic  accucheck q 6    #GERD  cont PPI    #Constipation  cont bowel regimen when rick PO    -----------------ID:-----------------------  #suspect UTI  UA appears purulent  f/u UA UCx BCx  s/p Cefepime x 1  cont renal dosed Cefepime 1g q 24    -----------------RENAL: ---------------  #ARF  new onset BUN Cr 173/ 7.86 in setting of obstruction  CT showing obstruction urine retention and b/l hydronephrosis  s/p 2L ivf bolus  placed benavides > will clamp after 1L o/p  monitor urine output  f/u urine lytes  NEPHRO Dr. Brooks consulted/ Dr. Kent consulted    #Hyperkalemia  K 7.2 on admission, EKG without peaked T waves  s/p insulin 5, dextrose and 2L IVF bolus  K on 4.9 > 5.2 (6/2)    -----------------ENDO:------------------  #Hypothyroidism  on PO synthroid 50  cont IV syntrhoid 37.5 mcg QHS    -----------------HEME/ONC: ---------------  #Fe def anemia  cont home iron when to PO    -----------------EXTREMITIES:-------  Peripheral IV  Benavides placed in ED 6/1 for acute retention    -----------------PROPHYLAXIS: -------    DVT heparin drip  GI PPI IV QD    -----------------DISPOSITION--------  ICU Assessment  83 yo F w/ Hx Asthma, primary HTN, GERD, constipation, Fe def Anemia, Hypothyroidism and recent hip replacement who was sent in from NH for AMS and hypoxia. Admit for acute renal failure w/ hyperkalemia 2/2 urinary obstruction and AHRF w/ c/f PE in setting of recent joint replacement.     PLAN:    -----------------CNS:------------------  #Acute metabolic encephalopathy  in setting of urine retention and uremia  c/f UTI  resolve as below  CT head w/out acute finding, unremarkable   more awake and alert 6/3    -----------------CVS:------------------  #Primary HTN  home meds, BP soft on admission  (amlodipine, metoprolol, losartan)  /69 (6/3)  started on lopressor IV 5 q6    -----------------RESPIRATORY:--------  #AHRF  c/f PE?, does not appear to be in asthma exac, no COPD or HF hx  family reports patient is baseline on 2L oxygen   > now tolerating 3L  cont duonebs q6  titrate O2 as tolerates,  no need for heparin drip at this time  doppler negative for DVT on left  d-dimer 1047      #Asthma  On albuterol at home  will cont duonebs    -----------------GI:----------------------  NPO for now while encephalopathic  accucheck q 6    #GERD  cont PPI    #Constipation  cont bowel regimen when rick PO    -----------------ID:-----------------------  #suspect UTI  UA appears purulent  BCx 6/1 negative x 2  f/u UCx   s/p Cefepime x 1  cont renal dosed Cefepime 1g q 24  WBC 13.12 > 11.19 (6/3)    -----------------RENAL: ---------------  #ARF  new onset BUN Cr 173/ 7.86 in setting of obstruction  CT showing obstruction urine retention and b/l hydronephrosis  s/p 2L ivf bolus  placed benavides > will clamp after 1L o/p  monitor urine output  NEPHRO Dr. Brooks consulted/ Dr. Kent consulted    #Hyperkalemia  K 7.2 on admission, EKG without peaked T waves  s/p insulin 5, dextrose and 2L IVF bolus  K on 4.9 > 5.2 > 3.8 (6/3)    #Hypernatremia  Na 146 > 149 (6/3)   Cl 119 > 120 (6/3)  started on d5W    -----------------ENDO:------------------  #Hypothyroidism  on PO synthroid 50  cont IV syntrhoid 37.5 mcg QHS    -----------------HEME/ONC: ---------------  #Fe def anemia  cont home iron when to PO    -----------------EXTREMITIES:-------  Peripheral IV  Benavides placed in ED 6/1 for acute retention    -----------------PROPHYLAXIS: -------    DVT heparin  GI PPI IV QD    -----------------DISPOSITION--------  ICU

## 2023-06-03 NOTE — PROGRESS NOTE ADULT - SUBJECTIVE AND OBJECTIVE BOX
ICU VISIT  82y Female    Meds:  cefepime   IVPB 1000 milliGRAM(s) IV Intermittent every 24 hours    Allergies    No Known Allergies    Intolerances        VITALS:  Vital Signs Last 24 Hrs  T(C): 36.7 (03 Jun 2023 16:00), Max: 36.7 (03 Jun 2023 16:00)  T(F): 98.1 (03 Jun 2023 16:00), Max: 98.1 (03 Jun 2023 16:00)  HR: 126 (03 Jun 2023 17:00) (101 - 138)  BP: 151/66 (03 Jun 2023 17:00) (108/49 - 163/61)  BP(mean): 87 (03 Jun 2023 17:00) (63 - 89)  RR: 19 (03 Jun 2023 17:00) (15 - 26)  SpO2: 93% (03 Jun 2023 17:00) (92% - 99%)    Parameters below as of 03 Jun 2023 16:00  Patient On (Oxygen Delivery Method): nasal cannula  O2 Flow (L/min): 2      LABS/DIAGNOSTIC TESTS:                          10.1   11.19 )-----------( 241      ( 03 Jun 2023 05:56 )             30.4         06-03    148<H>  |  119<H>  |  24<H>  ----------------------------<  118<H>  4.4   |  28  |  0.31<L>    Ca    7.8<L>      03 Jun 2023 13:20  Phos  3.1     06-03  Mg     1.8     06-03    TPro  5.2<L>  /  Alb  1.9<L>  /  TBili  0.5  /  DBili  x   /  AST  67<H>  /  ALT  21  /  AlkPhos  128<H>  06-03      LIVER FUNCTIONS - ( 03 Jun 2023 05:56 )  Alb: 1.9 g/dL / Pro: 5.2 g/dL / ALK PHOS: 128 U/L / ALT: 21 U/L DA / AST: 67 U/L / GGT: x             CULTURES: Clean Catch Clean Catch (Midstream)  06-01 @ 20:55   No growth  --  --      .Blood Blood-Peripheral  06-01 @ 18:50   No growth to date.  --  --      .Blood Blood-Peripheral  06-01 @ 18:45   No growth to date.  --  --            RADIOLOGY:      ROS:  [  ] UNABLE TO ELICIT ICU VISIT  82y Female who remains in the ICU but is doing better, she still c/o abdominal pain but less, no fevers or chills, diarrhea or vomiting. She is still tachycardic but not hypotensive or on any pressors. Her urine in her benavides is clearing up.    Meds:  cefepime   IVPB 1000 milliGRAM(s) IV Intermittent every 24 hours    Allergies    No Known Allergies    Intolerances        VITALS:  Vital Signs Last 24 Hrs  T(C): 36.7 (03 Jun 2023 16:00), Max: 36.7 (03 Jun 2023 16:00)  T(F): 98.1 (03 Jun 2023 16:00), Max: 98.1 (03 Jun 2023 16:00)  HR: 126 (03 Jun 2023 17:00) (101 - 138)  BP: 151/66 (03 Jun 2023 17:00) (108/49 - 163/61)  BP(mean): 87 (03 Jun 2023 17:00) (63 - 89)  RR: 19 (03 Jun 2023 17:00) (15 - 26)  SpO2: 93% (03 Jun 2023 17:00) (92% - 99%)    Parameters below as of 03 Jun 2023 16:00  Patient On (Oxygen Delivery Method): nasal cannula  O2 Flow (L/min): 2      LABS/DIAGNOSTIC TESTS:                          10.1   11.19 )-----------( 241      ( 03 Jun 2023 05:56 )             30.4         06-03    148<H>  |  119<H>  |  24<H>  ----------------------------<  118<H>  4.4   |  28  |  0.31<L>    Ca    7.8<L>      03 Jun 2023 13:20  Phos  3.1     06-03  Mg     1.8     06-03    TPro  5.2<L>  /  Alb  1.9<L>  /  TBili  0.5  /  DBili  x   /  AST  67<H>  /  ALT  21  /  AlkPhos  128<H>  06-03      LIVER FUNCTIONS - ( 03 Jun 2023 05:56 )  Alb: 1.9 g/dL / Pro: 5.2 g/dL / ALK PHOS: 128 U/L / ALT: 21 U/L DA / AST: 67 U/L / GGT: x             CULTURES: Clean Catch Clean Catch (Midstream)  06-01 @ 20:55   No growth  --  --      .Blood Blood-Peripheral  06-01 @ 18:50   No growth to date.  --  --      .Blood Blood-Peripheral  06-01 @ 18:45   No growth to date.  --  --            RADIOLOGY:      ROS:  [  ] UNABLE TO ELICIT

## 2023-06-03 NOTE — DIETITIAN INITIAL EVALUATION ADULT - PERTINENT MEDS FT
MEDICATIONS  (STANDING):  albuterol/ipratropium for Nebulization 3 milliLiter(s) Nebulizer every 6 hours  cefepime   IVPB 1000 milliGRAM(s) IV Intermittent every 24 hours  chlorhexidine 2% Cloths 1 Application(s) Topical <User Schedule>  dextrose 5%. 1000 milliLiter(s) (50 mL/Hr) IV Continuous <Continuous>  heparin   Injectable 5000 Unit(s) SubCutaneous every 8 hours  levothyroxine Injectable 38 MICROGram(s) IV Push at bedtime  pantoprazole  Injectable 40 milliGRAM(s) IV Push daily  potassium phosphate IVPB 30 milliMole(s) IV Intermittent once    MEDICATIONS  (PRN):

## 2023-06-03 NOTE — PROGRESS NOTE ADULT - SUBJECTIVE AND OBJECTIVE BOX
[facility] NEPHROLOGY- PROGRESS NOTE    Patient is a 83yo Female with Asthma, Essential HTN, GERD, constipation, Iron def Anemia, Hypothyroidism and recent Rt  hip replacement p/w AMS and hypoxia. Pt a/w CHIP and hyperkalemia with b/l hydronephrosis s/p benavides placement in ER with bipin pus. Nephrology consulted for Elevated serum creatinine.    REVIEW OF SYSTEMS: Unable to obtain due to mental status      VITALS:  T(F): 98.1 (23 @ 16:00), Max: 98.1 (23 @ 16:00)  HR: 118 (23 @ 16:00)  BP: 135/60 (23 @ 16:00)  RR: 19 (23 @ 16:00)  SpO2: 99% (23 @ 16:00)  Wt(kg): --     @ 07:01  -   @ 07:00  --------------------------------------------------------  IN: 3200 mL / OUT: 3155 mL / NET: 45 mL     @ 07:01  -   @ 16:23  --------------------------------------------------------  IN: 1050 mL / OUT: 1265 mL / NET: -215 mL        Weight (kg): 63.458 ( @ 17:52)      PHYSICAL EXAM:  Gen: Mild lethargy/ Confusion  Cards: tachy , +S1/S2,   Resp: CTA B/L  GI: soft, diffuse abd tenderness/ no guarding  : +benavides   Extremities: no LLE edema, RLE in brace      LABS:      148<H>  |  119<H>  |  24<H>  ----------------------------<  118<H>  4.4   |  28  |  0.31<L>    Ca    7.8<L>      2023 13:20  Phos  3.1       Mg     1.8         TPro  5.2<L>  /  Alb  1.9<L>  /  TBili  0.5  /  DBili      /  AST  67<H>  /  ALT  21  /  AlkPhos  128<H>      Creatinine Trend: 0.31 <--, 0.26 <--, 1.15 <--, 2.90 <--, 3.28 <--, 4.83 <--, 6.76 <--, 7.86 <--                        10.1   11.19 )-----------( 241      ( 2023 05:56 )             30.4     Urine Studies:  Urinalysis Basic - ( 2023 20:55 )    Color: Yellow / Appearance: Slightly Turbid / S.015 / pH:   Gluc:  / Ketone: Negative  / Bili: Negative / Urobili: Negative   Blood:  / Protein: 30 mg/dL / Nitrite: Negative   Leuk Esterase: Moderate / RBC: 10-25 /HPF / WBC >50 /HPF   Sq Epi:  / Non Sq Epi:  / Bacteria: Moderate /HPF      Sodium, Random Urine: 35 mmol/L ( @ 20:55)  Osmolality, Random Urine: 365 mos/kg ( @ 20:55)  Potassium, Random Urine: 39 mmol/L ( @ 20:55)  Creatinine, Random Urine: 78 mg/dL ( @ 20:55)

## 2023-06-03 NOTE — DIETITIAN INITIAL EVALUATION ADULT - NSICDXPASTMEDICALHX_GEN_ALL_CORE_FT
PAST MEDICAL HISTORY:  Asthma with acute exacerbation, unspecified asthma severity     Constipation     Essential hypertension     Fe deficiency anemia     GERD (gastroesophageal reflux disease)     Hypothyroid

## 2023-06-03 NOTE — DIETITIAN INITIAL EVALUATION ADULT - OTHER INFO
Pt from skilled nursing facility for rehab after hip surgery about 2wks ago at [facility], visited in ICU, lethargy, limited intake/wt change history from pt at  present; Per MD, decreased intake a few days with abdominal pain PTA; Unknown food allergies per Chart; NPO x 2-3 d in-house

## 2023-06-04 LAB
ALBUMIN SERPL ELPH-MCNC: 1.9 G/DL — LOW (ref 3.5–5)
ALP SERPL-CCNC: 123 U/L — HIGH (ref 40–120)
ALT FLD-CCNC: 24 U/L DA — SIGNIFICANT CHANGE UP (ref 10–60)
ANION GAP SERPL CALC-SCNC: 0 MMOL/L — LOW (ref 5–17)
AST SERPL-CCNC: 45 U/L — HIGH (ref 10–40)
BASOPHILS # BLD AUTO: 0.05 K/UL — SIGNIFICANT CHANGE UP (ref 0–0.2)
BASOPHILS NFR BLD AUTO: 0.3 % — SIGNIFICANT CHANGE UP (ref 0–2)
BILIRUB SERPL-MCNC: 0.4 MG/DL — SIGNIFICANT CHANGE UP (ref 0.2–1.2)
BUN SERPL-MCNC: 13 MG/DL — SIGNIFICANT CHANGE UP (ref 7–18)
CALCIUM SERPL-MCNC: 7.8 MG/DL — LOW (ref 8.4–10.5)
CHLORIDE SERPL-SCNC: 117 MMOL/L — HIGH (ref 96–108)
CO2 SERPL-SCNC: 31 MMOL/L — SIGNIFICANT CHANGE UP (ref 22–31)
CREAT SERPL-MCNC: 0.26 MG/DL — LOW (ref 0.5–1.3)
CULTURE RESULTS: NO GROWTH — SIGNIFICANT CHANGE UP
EGFR: 110 ML/MIN/1.73M2 — SIGNIFICANT CHANGE UP
EOSINOPHIL # BLD AUTO: 0.16 K/UL — SIGNIFICANT CHANGE UP (ref 0–0.5)
EOSINOPHIL NFR BLD AUTO: 1.1 % — SIGNIFICANT CHANGE UP (ref 0–6)
GLUCOSE SERPL-MCNC: 115 MG/DL — HIGH (ref 70–99)
HCT VFR BLD CALC: 30.7 % — LOW (ref 34.5–45)
HGB BLD-MCNC: 10 G/DL — LOW (ref 11.5–15.5)
IMM GRANULOCYTES NFR BLD AUTO: 4.4 % — HIGH (ref 0–0.9)
LYMPHOCYTES # BLD AUTO: 0.8 K/UL — LOW (ref 1–3.3)
LYMPHOCYTES # BLD AUTO: 5.6 % — LOW (ref 13–44)
MAGNESIUM SERPL-MCNC: 1.5 MG/DL — LOW (ref 1.6–2.6)
MCHC RBC-ENTMCNC: 32.6 GM/DL — SIGNIFICANT CHANGE UP (ref 32–36)
MCHC RBC-ENTMCNC: 33.1 PG — SIGNIFICANT CHANGE UP (ref 27–34)
MCV RBC AUTO: 101.7 FL — HIGH (ref 80–100)
MONOCYTES # BLD AUTO: 1.14 K/UL — HIGH (ref 0–0.9)
MONOCYTES NFR BLD AUTO: 7.9 % — SIGNIFICANT CHANGE UP (ref 2–14)
NEUTROPHILS # BLD AUTO: 11.57 K/UL — HIGH (ref 1.8–7.4)
NEUTROPHILS NFR BLD AUTO: 80.7 % — HIGH (ref 43–77)
NRBC # BLD: 0 /100 WBCS — SIGNIFICANT CHANGE UP (ref 0–0)
PHOSPHATE SERPL-MCNC: 1.2 MG/DL — LOW (ref 2.5–4.5)
PLATELET # BLD AUTO: 216 K/UL — SIGNIFICANT CHANGE UP (ref 150–400)
POTASSIUM SERPL-MCNC: 3.9 MMOL/L — SIGNIFICANT CHANGE UP (ref 3.5–5.3)
POTASSIUM SERPL-SCNC: 3.9 MMOL/L — SIGNIFICANT CHANGE UP (ref 3.5–5.3)
PROT SERPL-MCNC: 5.4 G/DL — LOW (ref 6–8.3)
RBC # BLD: 3.02 M/UL — LOW (ref 3.8–5.2)
RBC # FLD: 12.7 % — SIGNIFICANT CHANGE UP (ref 10.3–14.5)
SODIUM SERPL-SCNC: 148 MMOL/L — HIGH (ref 135–145)
SPECIMEN SOURCE: SIGNIFICANT CHANGE UP
WBC # BLD: 14.35 K/UL — HIGH (ref 3.8–10.5)
WBC # FLD AUTO: 14.35 K/UL — HIGH (ref 3.8–10.5)

## 2023-06-04 RX ORDER — LEVOTHYROXINE SODIUM 125 MCG
50 TABLET ORAL DAILY
Refills: 0 | Status: DISCONTINUED | OUTPATIENT
Start: 2023-06-04 | End: 2023-06-12

## 2023-06-04 RX ORDER — SODIUM CHLORIDE 9 MG/ML
1000 INJECTION, SOLUTION INTRAVENOUS
Refills: 0 | Status: DISCONTINUED | OUTPATIENT
Start: 2023-06-04 | End: 2023-06-05

## 2023-06-04 RX ORDER — PANTOPRAZOLE SODIUM 20 MG/1
40 TABLET, DELAYED RELEASE ORAL
Refills: 0 | Status: DISCONTINUED | OUTPATIENT
Start: 2023-06-04 | End: 2023-06-06

## 2023-06-04 RX ORDER — AMLODIPINE BESYLATE 2.5 MG/1
5 TABLET ORAL DAILY
Refills: 0 | Status: DISCONTINUED | OUTPATIENT
Start: 2023-06-04 | End: 2023-06-12

## 2023-06-04 RX ORDER — ALBUTEROL 90 UG/1
2 AEROSOL, METERED ORAL EVERY 6 HOURS
Refills: 0 | Status: DISCONTINUED | OUTPATIENT
Start: 2023-06-04 | End: 2023-06-12

## 2023-06-04 RX ORDER — MAGNESIUM SULFATE 500 MG/ML
2 VIAL (ML) INJECTION ONCE
Refills: 0 | Status: COMPLETED | OUTPATIENT
Start: 2023-06-04 | End: 2023-06-04

## 2023-06-04 RX ORDER — POTASSIUM PHOSPHATE, MONOBASIC POTASSIUM PHOSPHATE, DIBASIC 236; 224 MG/ML; MG/ML
30 INJECTION, SOLUTION INTRAVENOUS ONCE
Refills: 0 | Status: COMPLETED | OUTPATIENT
Start: 2023-06-04 | End: 2023-06-04

## 2023-06-04 RX ORDER — METOPROLOL TARTRATE 50 MG
25 TABLET ORAL DAILY
Refills: 0 | Status: DISCONTINUED | OUTPATIENT
Start: 2023-06-04 | End: 2023-06-05

## 2023-06-04 RX ADMIN — POTASSIUM PHOSPHATE, MONOBASIC POTASSIUM PHOSPHATE, DIBASIC 83.33 MILLIMOLE(S): 236; 224 INJECTION, SOLUTION INTRAVENOUS at 06:28

## 2023-06-04 RX ADMIN — HEPARIN SODIUM 5000 UNIT(S): 5000 INJECTION INTRAVENOUS; SUBCUTANEOUS at 14:30

## 2023-06-04 RX ADMIN — PANTOPRAZOLE SODIUM 40 MILLIGRAM(S): 20 TABLET, DELAYED RELEASE ORAL at 10:58

## 2023-06-04 RX ADMIN — SODIUM CHLORIDE 75 MILLILITER(S): 9 INJECTION, SOLUTION INTRAVENOUS at 11:27

## 2023-06-04 RX ADMIN — CEFEPIME 100 MILLIGRAM(S): 1 INJECTION, POWDER, FOR SOLUTION INTRAMUSCULAR; INTRAVENOUS at 06:43

## 2023-06-04 RX ADMIN — Medication 25 MILLIGRAM(S): at 12:28

## 2023-06-04 RX ADMIN — HEPARIN SODIUM 5000 UNIT(S): 5000 INJECTION INTRAVENOUS; SUBCUTANEOUS at 21:06

## 2023-06-04 RX ADMIN — Medication 50 MICROGRAM(S): at 10:58

## 2023-06-04 RX ADMIN — AMLODIPINE BESYLATE 5 MILLIGRAM(S): 2.5 TABLET ORAL at 10:58

## 2023-06-04 RX ADMIN — Medication 3 MILLILITER(S): at 02:05

## 2023-06-04 RX ADMIN — HEPARIN SODIUM 5000 UNIT(S): 5000 INJECTION INTRAVENOUS; SUBCUTANEOUS at 05:14

## 2023-06-04 RX ADMIN — CHLORHEXIDINE GLUCONATE 1 APPLICATION(S): 213 SOLUTION TOPICAL at 05:15

## 2023-06-04 RX ADMIN — CEFEPIME 100 MILLIGRAM(S): 1 INJECTION, POWDER, FOR SOLUTION INTRAMUSCULAR; INTRAVENOUS at 17:29

## 2023-06-04 RX ADMIN — Medication 25 GRAM(S): at 06:28

## 2023-06-04 RX ADMIN — Medication 5 MILLIGRAM(S): at 05:14

## 2023-06-04 NOTE — PROGRESS NOTE ADULT - ATTENDING COMMENTS
81 yo F w/ Hx Asthma, HTN, GERD, constipation, chronic anemia, Hypothyroidism and recent hip replacement who was sent in from NH for AMS and hypoxia. Admit for acute renal failure w/ hyperkalemia 2/2 urinary obstruction and AHRF. Noted with cloudy urine concern for UTI.     Assessment:  1. Sepsis  2. UTI  3. Acute hypoxia  4. Obstructive uropathy  5. Acute Renal failure   6. Acute encephalopathy   7. Asthma  8. HTN    Plan:  - Slight improvement in mentation   - Monitor hemodynamics  - Remains tachycardic, will restart beta blockers IV  - Close neurologic monitoring  - Aspiration precautions  - IV fluid hydration  - IV antibiotics for UTI  - Follow up urine cultures  - Difficult benavides placement, will maintain for now   - Pain control with IV tylenol if needed   - Oxygen support  - No evidence of DVT in left leg  - Repeat BMP in PM   - Nephrology follow up   - DVT prophylaxis  - PT evaluation   - Cont. ICU Care
83 yo F w/ Hx Asthma, HTN, GERD, constipation, chronic anemia, Hypothyroidism and recent hip replacement who was sent in from NH for AMS and hypoxia. Admit for acute renal failure w/ hyperkalemia 2/2 urinary obstruction and AHRF. Noted with cloudy urine concern for UTI.     Assessment:  1. Sepsis  2. UTI  3. Acute hypoxia  4. Obstructive uropathy  5. Acute Renal failure   6. Acute encephalopathy   7. Asthma  8. HTN    Plan:  - Awake this morning and answering questions appropriately   - Monitor hemodynamics  - Restart oral antihypertensives   - Close neurologic monitoring  - Aspiration precautions  - IV fluid hydration  - IV antibiotics for UTI  - Difficult benavides placement, will maintain for now   - Pain control with IV tylenol if needed   - Oxygen support  - No evidence of DVT in left leg  - Repeat BMP in PM   - Nephrology follow up   - Start oral dysphagia diet, was on puree diet in rehab  - DVT prophylaxis  - PT evaluation   - Transfer out of ICU
83 yo F w/ Hx Asthma, HTN, GERD, constipation, chronic anemia, Hypothyroidism and recent hip replacement who was sent in from NH for AMS and hypoxia. Admit for acute renal failure w/ hyperkalemia 2/2 urinary obstruction and AHRF. Noted with cloudy urine concern for UTI.     Assessment:  1. Sepsis  2. UTI  3. Acute hypoxia  4. Obstructive uropathy  5. Acute Renal failure   6. Acute encephalopathy   7. Asthma  8. HTN    Plan:  - Monitor hemodynamics  - Close neurologic monitoring  - Aspiration precautions  - IV fluid hydration  - IV antibiotics for UTI  - Follow up urine cultures  - Pain control with IV tylenol if needed   - Oxygen support  - Obtain LE doppler studies to evaluate for PE   - Repeat BMP in PM   - Nephrology follow up   - DVT prophylaxis  - Cont. ICU Care

## 2023-06-04 NOTE — PROGRESS NOTE ADULT - ASSESSMENT
Patient is a 83yo Female with Asthma, Essential HTN, GERD, constipation, Iron def Anemia, Hypothyroidism and recent Rt  hip replacement p/w  AMS and hypoxia. Pt a/w CHIP and hyperkalemia with b/l hydronephrosis s/p benavides placement in ER with bipin pus. Nephrology consulted for Elevated serum creatinine.    1. CHIP- CHIP 2/2 Obstructive uropathy now resolved. CT abd/pelvis with distended bladder and mild b/l hydro. s/p benavides placement with post-obstructive diuresis now improving (UO decreased to 2.5L). Recc to change IVF to 1/2 NS @ 70 ml/hour. Recc to repeat renal US in 2-3 days to monitor for resolution. Monitor K/Mg/Phos daily; replete prn. Strict I/Os. Avoid nephrotoxins/ NSAIDs/ RCA. Monitor BMP.    2. Obstructive uropathy. See above.     3. Hyperkalemia- resolved.     4. Sepsis 2/2 UTI- +UA, Pt on Cefepime. UCx and BCx pending    [facility] NEPHROLOGY  Chaka Brooks M.D.  Ildefonso Gonzalez D.O.  Dana Kent M.D.  Jayne Stratton, MSN, ANP-C  [phone]  Fax: [phone]    [facility] [place] [address], [id number]  [place] [address]

## 2023-06-04 NOTE — PROGRESS NOTE ADULT - SUBJECTIVE AND OBJECTIVE BOX
[facility] NEPHROLOGY- PROGRESS NOTE    Patient is a 83yo Female with Asthma, Essential HTN, GERD, constipation, Iron def Anemia, Hypothyroidism and recent Rt  hip replacement p/w AMS and hypoxia. Pt a/w CHIP and hyperkalemia with b/l hydronephrosis s/p benavides placement in ER with bipin pus. Nephrology consulted for Elevated serum creatinine.    REVIEW OF SYSTEMS: limited due to mental status but patient denies any complaints.      VITALS:  T(F): 97 (23 @ 08:00), Max: 99.1 (23 @ 04:00)  HR: 104 (23 @ 11:00)  BP: 156/75 (23 @ 11:00)  RR: 18 (23 @ 11:00)  SpO2: 97% (23 @ 11:00)  Wt(kg): --     @ 07:01  -   @ 07:00  --------------------------------------------------------  IN: 1616.6 mL / OUT: 2580 mL / NET: -963.4 mL     @ 07:01  -   @ 12:46  --------------------------------------------------------  IN: 241.6 mL / OUT: 60 mL / NET: 181.6 mL        PHYSICAL EXAM:  Gen: Mild lethargy/ Confusion  Cards: tachy , +S1/S2,   Resp: CTA B/L  GI: soft, diffuse abd tenderness/ no guarding  : +benavides   Extremities: no LLE edema, RLE in brace      LABS:      148<H>  |  117<H>  |  13  ----------------------------<  115<H>  3.9   |  31  |  0.26<L>    Ca    7.8<L>      2023 03:20  Phos  1.2       Mg     1.5         TPro  5.4<L>  /  Alb  1.9<L>  /  TBili  0.4  /  DBili      /  AST  45<H>  /  ALT  24  /  AlkPhos  123<H>      Creatinine Trend: 0.26 <--, 0.31 <--, 0.26 <--, 1.15 <--, 2.90 <--, 3.28 <--, 4.83 <--, 6.76 <--, 7.86 <--                        10.0   14.35 )-----------( 216      ( 2023 03:20 )             30.7     Urine Studies:  Urinalysis Basic - ( 2023 20:55 )    Color: Yellow / Appearance: Slightly Turbid / S.015 / pH:   Gluc:  / Ketone: Negative  / Bili: Negative / Urobili: Negative   Blood:  / Protein: 30 mg/dL / Nitrite: Negative   Leuk Esterase: Moderate / RBC: 10-25 /HPF / WBC >50 /HPF   Sq Epi:  / Non Sq Epi:  / Bacteria: Moderate /HPF      Sodium, Random Urine: 35 mmol/L ( @ 20:55)  Osmolality, Random Urine: 365 mos/kg ( @ 20:55)  Potassium, Random Urine: 39 mmol/L ( @ 20:55)  Creatinine, Random Urine: 78 mg/dL ( @ 20:55)

## 2023-06-04 NOTE — PROGRESS NOTE ADULT - ASSESSMENT
81 yo F w/ Hx Asthma, primary HTN, GERD, constipation, Fe def Anemia, Hypothyroidism and recent hip replacement who was sent in from NH for AMS and hypoxia. Admit for acute renal failure w/ hyperkalemia 2/2 urinary obstruction and AHRF w/ c/f PE in setting of recent joint replacement.     PLAN:    -----------------CNS:------------------  #Acute metabolic encephalopathy  in setting of urine retention and uremia  c/f UTI  resolve as below  CT head w/out acute finding, unremarkable   more awake and alert 6/3    -----------------CVS:------------------  #Primary HTN  home meds, BP soft on admission  (amlodipine, metoprolol, losartan)  /69 (6/3)  started on lopressor IV 5 q6    -----------------RESPIRATORY:--------  #AHRF  c/f PE?, does not appear to be in asthma exac, no COPD or HF hx  family reports patient is baseline on 2L oxygen   > now tolerating 3L  cont duonebs q6  titrate O2 as tolerates,  no need for heparin drip at this time  doppler negative for DVT on left  d-dimer 1047      #Asthma  On albuterol at home  will cont duonebs    -----------------GI:----------------------  NPO for now while encephalopathic  accucheck q 6    #GERD  cont PPI    #Constipation  cont bowel regimen when rick PO    -----------------ID:-----------------------  #suspect UTI  UA appears purulent  BCx 6/1 negative x 2  UCx NTGTD  s/p Cefepime x 1  cont renal dosed Cefepime 1g q 24  WBC 13.12 > 11.19 (6/3)    -----------------RENAL: ---------------  #ARF  new onset BUN Cr 173/ 7.86 in setting of obstruction  CT showing obstruction urine retention and b/l hydronephrosis  s/p 2L ivf bolus  placed benavides > will clamp after 1L o/p  monitor urine output  NEPHRO Dr. Brooks consulted/ Dr. Kent consulted    #Hyperkalemia  K 7.2 on admission, EKG without peaked T waves  s/p insulin 5, dextrose and 2L IVF bolus  K on 4.9 > 5.2 > 3.8 (6/3)    #Hypernatremia  Na 146 > 149 (6/3)   Cl 119 > 120 (6/3)  started on d5W    -----------------ENDO:------------------  #Hypothyroidism  on PO synthroid 50  cont IV syntrhoid 37.5 mcg QHS    -----------------HEME/ONC: ---------------  #Fe def anemia  cont home iron when to PO    -----------------EXTREMITIES:-------  Peripheral IV  Benavides placed in ED 6/1 for acute retention    -----------------PROPHYLAXIS: -------    DVT heparin  GI PPI IV QD    -----------------DISPOSITION--------  ICU   83 yo F w/ Hx Asthma, primary HTN, GERD, constipation, Fe def Anemia, Hypothyroidism and recent hip replacement who was sent in from NH for AMS and hypoxia. Admit for acute renal failure w/ hyperkalemia 2/2 urinary obstruction and AHRF w/ c/f PE in setting of recent joint replacement.     PLAN:    -----------------CNS:------------------  #Acute metabolic encephalopathy  in setting of urine retention and uremia  c/f UTI  CT head w/out acute finding, unremarkable   more awake and alert    -----------------CVS:------------------  #Primary HTN  home meds, BP soft on admission  (amlodipine, metoprolol, losartan)  Resumed home oral meds, metoprolol 25mg and amlodipine 5mg.     -----------------RESPIRATORY:--------  #AHRF  c/f PE?, does not appear to be in asthma exac, no COPD or HF hx  family reports patient is baseline on 2L oxygen   > now tolerating 3L  cont duonebs q6  titrate O2 as tolerates  doppler negative for DVT on left  d-dimer 1047    #Asthma  On albuterol at home  no wheezing on exam  Albuterol PRN    -----------------GI:----------------------  More awake today  Resumed oral pureed diet    #GERD  cont PPI    #Constipation  cont bowel regimen when rick PO    -----------------ID:-----------------------  #suspect UTI  UA appears purulent  BCx 6/1 negative x 2  UCx NTGTD  s/p Cefepime x 1  cont renal dosed Cefepime 1g q 24    -----------------RENAL: ---------------  #ARF  new onset BUN Cr 173/ 7.86 in setting of obstruction  CT showing obstruction urine retention and b/l hydronephrosis  s/p 2L ivf bolus  placed benavides > will clamp after 1L o/p  monitor urine output  NEPHRO Dr. Brooks consulted/ Dr. Kent consulted  RESOLVED**      #Hypernatremia  Na 146 > 149 >158  Cl 119 > 120 (6/3)  c/w IVF D5 @75cc      -----------------ENDO:------------------  #Hypothyroidism  on PO synthroid 50  Resumed oral med    -----------------HEME/ONC: ---------------  #Fe def anemia  cont home iron when to PO    -----------------EXTREMITIES:-------  Peripheral IV  Benavides placed in ED 6/1 for acute retention, c/w benavides    -----------------PROPHYLAXIS: -------    DVT heparin  GI PPI IV QD    -----------------DISPOSITION--------  Downgrade to medicine

## 2023-06-05 LAB
ALBUMIN SERPL ELPH-MCNC: 1.9 G/DL — LOW (ref 3.5–5)
ALP SERPL-CCNC: 116 U/L — SIGNIFICANT CHANGE UP (ref 40–120)
ALT FLD-CCNC: 24 U/L DA — SIGNIFICANT CHANGE UP (ref 10–60)
ANION GAP SERPL CALC-SCNC: 0 MMOL/L — LOW (ref 5–17)
AST SERPL-CCNC: 42 U/L — HIGH (ref 10–40)
BASOPHILS # BLD AUTO: 0.07 K/UL — SIGNIFICANT CHANGE UP (ref 0–0.2)
BASOPHILS NFR BLD AUTO: 0.4 % — SIGNIFICANT CHANGE UP (ref 0–2)
BILIRUB SERPL-MCNC: 0.4 MG/DL — SIGNIFICANT CHANGE UP (ref 0.2–1.2)
BUN SERPL-MCNC: 6 MG/DL — LOW (ref 7–18)
CALCIUM SERPL-MCNC: 7.9 MG/DL — LOW (ref 8.4–10.5)
CHLORIDE SERPL-SCNC: 104 MMOL/L — SIGNIFICANT CHANGE UP (ref 96–108)
CO2 SERPL-SCNC: 36 MMOL/L — HIGH (ref 22–31)
CREAT SERPL-MCNC: 0.2 MG/DL — LOW (ref 0.5–1.3)
EGFR: 117 ML/MIN/1.73M2 — SIGNIFICANT CHANGE UP
EOSINOPHIL # BLD AUTO: 0.51 K/UL — HIGH (ref 0–0.5)
EOSINOPHIL NFR BLD AUTO: 2.7 % — SIGNIFICANT CHANGE UP (ref 0–6)
GLUCOSE SERPL-MCNC: 108 MG/DL — HIGH (ref 70–99)
HCT VFR BLD CALC: 31 % — LOW (ref 34.5–45)
HGB BLD-MCNC: 9.9 G/DL — LOW (ref 11.5–15.5)
IMM GRANULOCYTES NFR BLD AUTO: 5.7 % — HIGH (ref 0–0.9)
LYMPHOCYTES # BLD AUTO: 1.11 K/UL — SIGNIFICANT CHANGE UP (ref 1–3.3)
LYMPHOCYTES # BLD AUTO: 5.8 % — LOW (ref 13–44)
MAGNESIUM SERPL-MCNC: 1.6 MG/DL — SIGNIFICANT CHANGE UP (ref 1.6–2.6)
MCHC RBC-ENTMCNC: 31.9 GM/DL — LOW (ref 32–36)
MCHC RBC-ENTMCNC: 32.7 PG — SIGNIFICANT CHANGE UP (ref 27–34)
MCV RBC AUTO: 102.3 FL — HIGH (ref 80–100)
MONOCYTES # BLD AUTO: 1.23 K/UL — HIGH (ref 0–0.9)
MONOCYTES NFR BLD AUTO: 6.4 % — SIGNIFICANT CHANGE UP (ref 2–14)
NEUTROPHILS # BLD AUTO: 15.1 K/UL — HIGH (ref 1.8–7.4)
NEUTROPHILS NFR BLD AUTO: 79 % — HIGH (ref 43–77)
NRBC # BLD: 0 /100 WBCS — SIGNIFICANT CHANGE UP (ref 0–0)
PHOSPHATE SERPL-MCNC: 1.1 MG/DL — LOW (ref 2.5–4.5)
PLATELET # BLD AUTO: 206 K/UL — SIGNIFICANT CHANGE UP (ref 150–400)
POTASSIUM SERPL-MCNC: 3.8 MMOL/L — SIGNIFICANT CHANGE UP (ref 3.5–5.3)
POTASSIUM SERPL-SCNC: 3.8 MMOL/L — SIGNIFICANT CHANGE UP (ref 3.5–5.3)
PROT SERPL-MCNC: 5.5 G/DL — LOW (ref 6–8.3)
RBC # BLD: 3.03 M/UL — LOW (ref 3.8–5.2)
RBC # FLD: 12.5 % — SIGNIFICANT CHANGE UP (ref 10.3–14.5)
SODIUM SERPL-SCNC: 140 MMOL/L — SIGNIFICANT CHANGE UP (ref 135–145)
WBC # BLD: 19.11 K/UL — HIGH (ref 3.8–10.5)
WBC # FLD AUTO: 19.11 K/UL — HIGH (ref 3.8–10.5)

## 2023-06-05 RX ORDER — BUDESONIDE AND FORMOTEROL FUMARATE DIHYDRATE 160; 4.5 UG/1; UG/1
2 AEROSOL RESPIRATORY (INHALATION)
Refills: 0 | Status: DISCONTINUED | OUTPATIENT
Start: 2023-06-05 | End: 2023-06-12

## 2023-06-05 RX ORDER — METOPROLOL TARTRATE 50 MG
12.5 TABLET ORAL
Refills: 0 | Status: DISCONTINUED | OUTPATIENT
Start: 2023-06-05 | End: 2023-06-12

## 2023-06-05 RX ORDER — ATORVASTATIN CALCIUM 80 MG/1
10 TABLET, FILM COATED ORAL AT BEDTIME
Refills: 0 | Status: DISCONTINUED | OUTPATIENT
Start: 2023-06-05 | End: 2023-06-12

## 2023-06-05 RX ORDER — SODIUM,POTASSIUM PHOSPHATES 278-250MG
1 POWDER IN PACKET (EA) ORAL ONCE
Refills: 0 | Status: COMPLETED | OUTPATIENT
Start: 2023-06-05 | End: 2023-06-05

## 2023-06-05 RX ORDER — TIOTROPIUM BROMIDE 18 UG/1
2 CAPSULE ORAL; RESPIRATORY (INHALATION) DAILY
Refills: 0 | Status: DISCONTINUED | OUTPATIENT
Start: 2023-06-05 | End: 2023-06-12

## 2023-06-05 RX ORDER — MAGNESIUM SULFATE 500 MG/ML
1 VIAL (ML) INJECTION ONCE
Refills: 0 | Status: COMPLETED | OUTPATIENT
Start: 2023-06-05 | End: 2023-06-05

## 2023-06-05 RX ADMIN — BUDESONIDE AND FORMOTEROL FUMARATE DIHYDRATE 2 PUFF(S): 160; 4.5 AEROSOL RESPIRATORY (INHALATION) at 23:43

## 2023-06-05 RX ADMIN — Medication 100 GRAM(S): at 09:27

## 2023-06-05 RX ADMIN — Medication 1 PACKET(S): at 05:57

## 2023-06-05 RX ADMIN — HEPARIN SODIUM 5000 UNIT(S): 5000 INJECTION INTRAVENOUS; SUBCUTANEOUS at 13:10

## 2023-06-05 RX ADMIN — AMLODIPINE BESYLATE 5 MILLIGRAM(S): 2.5 TABLET ORAL at 05:56

## 2023-06-05 RX ADMIN — Medication 100 MILLIGRAM(S): at 01:57

## 2023-06-05 RX ADMIN — Medication 12.5 MILLIGRAM(S): at 17:37

## 2023-06-05 RX ADMIN — HEPARIN SODIUM 5000 UNIT(S): 5000 INJECTION INTRAVENOUS; SUBCUTANEOUS at 05:19

## 2023-06-05 RX ADMIN — Medication 50 MICROGRAM(S): at 05:19

## 2023-06-05 RX ADMIN — Medication 62.5 MILLIMOLE(S): at 10:18

## 2023-06-05 RX ADMIN — CEFEPIME 100 MILLIGRAM(S): 1 INJECTION, POWDER, FOR SOLUTION INTRAMUSCULAR; INTRAVENOUS at 17:38

## 2023-06-05 RX ADMIN — CHLORHEXIDINE GLUCONATE 1 APPLICATION(S): 213 SOLUTION TOPICAL at 05:19

## 2023-06-05 RX ADMIN — HEPARIN SODIUM 5000 UNIT(S): 5000 INJECTION INTRAVENOUS; SUBCUTANEOUS at 23:45

## 2023-06-05 RX ADMIN — ATORVASTATIN CALCIUM 10 MILLIGRAM(S): 80 TABLET, FILM COATED ORAL at 23:43

## 2023-06-05 RX ADMIN — CEFEPIME 100 MILLIGRAM(S): 1 INJECTION, POWDER, FOR SOLUTION INTRAMUSCULAR; INTRAVENOUS at 05:17

## 2023-06-05 NOTE — PROGRESS NOTE ADULT - SUBJECTIVE AND OBJECTIVE BOX
INTERVAL HPI/OVERNIGHT EVENTS:   - New admit  SVT jacqueline    PRESSORS: [ ] YES [ ] NO  WHICH:    ANTIBIOTICS:                  DATE STARTED:  ANTIBIOTICS:                  DATE STARTED:    Antimicrobial:  cefepime   IVPB 1000 milliGRAM(s) IV Intermittent every 12 hours    Cardiovascular:  amLODIPine   Tablet 5 milliGRAM(s) Oral daily  metoprolol succinate ER 25 milliGRAM(s) Oral daily    Pulmonary:  albuterol    90 MICROgram(s) HFA Inhaler 2 Puff(s) Inhalation every 6 hours PRN  guaiFENesin Oral Liquid (Sugar-Free) 100 milliGRAM(s) Oral every 6 hours PRN    Hematalogic:  heparin   Injectable 5000 Unit(s) SubCutaneous every 8 hours    Other:  chlorhexidine 2% Cloths 1 Application(s) Topical <User Schedule>  dextrose 5%. 1000 milliLiter(s) IV Continuous <Continuous>  levothyroxine 50 MICROGram(s) Oral daily  pantoprazole    Tablet 40 milliGRAM(s) Oral before breakfast    albuterol    90 MICROgram(s) HFA Inhaler 2 Puff(s) Inhalation every 6 hours PRN  amLODIPine   Tablet 5 milliGRAM(s) Oral daily  cefepime   IVPB 1000 milliGRAM(s) IV Intermittent every 12 hours  chlorhexidine 2% Cloths 1 Application(s) Topical <User Schedule>  dextrose 5%. 1000 milliLiter(s) IV Continuous <Continuous>  guaiFENesin Oral Liquid (Sugar-Free) 100 milliGRAM(s) Oral every 6 hours PRN  heparin   Injectable 5000 Unit(s) SubCutaneous every 8 hours  levothyroxine 50 MICROGram(s) Oral daily  metoprolol succinate ER 25 milliGRAM(s) Oral daily  pantoprazole    Tablet 40 milliGRAM(s) Oral before breakfast    Drug Dosing Weight    Weight (kg): 63.458 (01 Jun 2023 17:52)    PHYSICAL EXAM:      LINES/DRAINS/DEVICES  CENTRAL LINE: [ ] YES [ ] NO  LOCATION:     [facility]: [ ] YES [ ] NO     A-LINE:  [ ] YES [ ] NO  LOCATION:       ICU Vital Signs Last 24 Hrs  T(C): 36.6 (05 Jun 2023 04:00), Max: 36.9 (04 Jun 2023 15:20)  T(F): 97.8 (05 Jun 2023 04:00), Max: 98.5 (04 Jun 2023 15:20)  HR: 83 (05 Jun 2023 07:00) (83 - 114)  BP: 134/49 (05 Jun 2023 07:00) (106/69 - 156/75)  BP(mean): 71 (05 Jun 2023 07:00) (70 - 96)  ABP: --  ABP(mean): --  RR: 20 (05 Jun 2023 07:00) (15 - 25)  SpO2: 97% (05 Jun 2023 07:00) (90% - 99%)    O2 Parameters below as of 05 Jun 2023 06:00  Patient On (Oxygen Delivery Method): nasal cannula  O2 Flow (L/min): 2                06-04 @ 07:01  -  06-05 @ 07:00  --------------------------------------------------------  IN: 2078.2 mL / OUT: 2950 mL / NET: -871.8 mL              LABS:  CBC Full  -  ( 05 Jun 2023 04:07 )  WBC Count : 19.11 K/uL  RBC Count : 3.03 M/uL  Hemoglobin : 9.9 g/dL  Hematocrit : 31.0 %  Platelet Count - Automated : 206 K/uL  Mean Cell Volume : 102.3 fl  Mean Cell Hemoglobin : 32.7 pg  Mean Cell Hemoglobin Concentration : 31.9 gm/dL  Auto Neutrophil # : 15.10 K/uL  Auto Lymphocyte # : 1.11 K/uL  Auto Monocyte # : 1.23 K/uL  Auto Eosinophil # : 0.51 K/uL  Auto Basophil # : 0.07 K/uL  Auto Neutrophil % : 79.0 %  Auto Lymphocyte % : 5.8 %  Auto Monocyte % : 6.4 %  Auto Eosinophil % : 2.7 %  Auto Basophil % : 0.4 %    06-05    140  |  104  |  6<L>  ----------------------------<  108<H>  3.8   |  36<H>  |  0.20<L>    Ca    7.9<L>      05 Jun 2023 04:07  Phos  1.1     06-05  Mg     1.6     06-05    TPro  5.5<L>  /  Alb  1.9<L>  /  TBili  0.4  /  DBili  x   /  AST  42<H>  /  ALT  24  /  AlkPhos  116  06-05        Culture Results:   No growth (06-03 @ 13:55)      RADIOLOGY & ADDITIONAL STUDIES REVIEWED DURING TEAM ROUNDS    [ ]GOALS OF CARE DISCUSSION WITH PATIENT/FAMILY/PROXY:    CRITICAL CARE TIME SPENT: 35 minutes   INTERVAL HPI/OVERNIGHT EVENTS:   - No acute events overnight    PRESSORS: [ ] YES [X] NO  WHICH:    ANTIBIOTICS: Cefepime 1g             DATE STARTED: 6/3/23    Antimicrobial:  cefepime   IVPB 1000 milliGRAM(s) IV Intermittent every 12 hours    Cardiovascular:  amLODIPine   Tablet 5 milliGRAM(s) Oral daily  metoprolol succinate ER 25 milliGRAM(s) Oral daily    Pulmonary:  albuterol    90 MICROgram(s) HFA Inhaler 2 Puff(s) Inhalation every 6 hours PRN  guaiFENesin Oral Liquid (Sugar-Free) 100 milliGRAM(s) Oral every 6 hours PRN    Hematalogic:  heparin   Injectable 5000 Unit(s) SubCutaneous every 8 hours    Other:  chlorhexidine 2% Cloths 1 Application(s) Topical <User Schedule>  dextrose 5%. 1000 milliLiter(s) IV Continuous <Continuous>  levothyroxine 50 MICROGram(s) Oral daily  pantoprazole    Tablet 40 milliGRAM(s) Oral before breakfast    albuterol    90 MICROgram(s) HFA Inhaler 2 Puff(s) Inhalation every 6 hours PRN  amLODIPine   Tablet 5 milliGRAM(s) Oral daily  cefepime   IVPB 1000 milliGRAM(s) IV Intermittent every 12 hours  chlorhexidine 2% Cloths 1 Application(s) Topical <User Schedule>  dextrose 5%. 1000 milliLiter(s) IV Continuous <Continuous>  guaiFENesin Oral Liquid (Sugar-Free) 100 milliGRAM(s) Oral every 6 hours PRN  heparin   Injectable 5000 Unit(s) SubCutaneous every 8 hours  levothyroxine 50 MICROGram(s) Oral daily  metoprolol succinate ER 25 milliGRAM(s) Oral daily  pantoprazole    Tablet 40 milliGRAM(s) Oral before breakfast    Drug Dosing Weight    Weight (kg): 63.458 (01 Jun 2023 17:52)    PHYSICAL EXAM:  GENERAL:   NAD, well-groomed  HEAD:    Atraumatic, Normocephalic  EYES:   EOMI, PERRLA, conjunctiva and sclera clear  ENMT:   No oropharyngeal exudates, erythema or lesions,  Moist mucous membranes  NECK:   Supple, no cervical lymphadenopathy, no JVD  NERVOUS SYSTEM:   Alert & Oriented X3, CN II-XII intact, Moves all extremities, full sensations to light touch   CHEST/LUNG:   Clear lung sounds to auscultation bilaterally. No wheezing or rhonchi.   HEART:    S1/S2 without murmurs, without rubs, or gallops.  ABDOMEN:   Soft, Nontender, Nondistended; Bowel sounds present, Bladder non distended, non palpable  EXTREMITIES:   Pulses palpable radial pulses 2+ bilat, DP/PT 1+/1+ bilat, without clubbing, cyanosis. SCDs on. Digits warm to touch with good cap refill < 3 secs  SKIN:  warm, dry, intact, normal color, no rash or abnormal lesions, without palpable nodes    LINES/DRAINS/DEVICES  CENTRAL LINE: [ ] YES [X ] NO  LOCATION:     [facility]: [X] YES [ ] NO     A-LINE:  [ ] YES [X ] NO  LOCATION:       ICU Vital Signs Last 24 Hrs  T(C): 36.6 (05 Jun 2023 04:00), Max: 36.9 (04 Jun 2023 15:20)  T(F): 97.8 (05 Jun 2023 04:00), Max: 98.5 (04 Jun 2023 15:20)  HR: 83 (05 Jun 2023 07:00) (83 - 114)  BP: 134/49 (05 Jun 2023 07:00) (106/69 - 156/75)  BP(mean): 71 (05 Jun 2023 07:00) (70 - 96)  ABP: --  ABP(mean): --  RR: 20 (05 Jun 2023 07:00) (15 - 25)  SpO2: 97% (05 Jun 2023 07:00) (90% - 99%)    O2 Parameters below as of 05 Jun 2023 06:00  Patient On (Oxygen Delivery Method): nasal cannula  O2 Flow (L/min): 2      06-04 @ 07:01  -  06-05 @ 07:00  --------------------------------------------------------  IN: 2078.2 mL / OUT: 2950 mL / NET: -871.8 mL        LABS:             CBC Full  -  ( 05 Jun 2023 04:07 )  WBC Count : 19.11 K/uL  RBC Count : 3.03 M/uL  Hemoglobin : 9.9 g/dL  Hematocrit : 31.0 %  Platelet Count - Automated : 206 K/uL  Mean Cell Volume : 102.3 fl  Mean Cell Hemoglobin : 32.7 pg  Mean Cell Hemoglobin Concentration : 31.9 gm/dL  Auto Neutrophil # : 15.10 K/uL  Auto Lymphocyte # : 1.11 K/uL  Auto Monocyte # : 1.23 K/uL  Auto Eosinophil # : 0.51 K/uL  Auto Basophil # : 0.07 K/uL  Auto Neutrophil % : 79.0 %  Auto Lymphocyte % : 5.8 %  Auto Monocyte % : 6.4 %  Auto Eosinophil % : 2.7 %  Auto Basophil % : 0.4 %      06-05    140  |  104  |  6<L>  ----------------------------<  108<H>  3.8   |  36<H>  |  0.20<L>    Ca    7.9<L>      05 Jun 2023 04:07  Phos  1.1     06-05  Mg     1.6     06-05    TPro  5.5<L>  /  Alb  1.9<L>  /  TBili  0.4  /  DBili  x   /  AST  42<H>  /  ALT  24  /  AlkPhos  116  06-05      Culture Results:   No growth (06-03 @ 13:55)    RADIOLOGY & ADDITIONAL STUDIES REVIEWED DURING TEAM ROUNDS    [ ]GOALS OF CARE DISCUSSION WITH PATIENT/FAMILY/PROXY:    CRITICAL CARE TIME SPENT: 35 minutes   INTERVAL HPI/OVERNIGHT EVENTS:   - Seen this AM at bedside. Pt is awake and alert, denies pain and following commands appropriately. No acute overnight events.     PRESSORS: [ ] YES [X] NO  WHICH:    ANTIBIOTICS: Cefepime 1g             DATE STARTED: 6/3/23    Antimicrobial:  cefepime   IVPB 1000 milliGRAM(s) IV Intermittent every 12 hours    Cardiovascular:  amLODIPine   Tablet 5 milliGRAM(s) Oral daily  metoprolol succinate ER 25 milliGRAM(s) Oral daily    Pulmonary:  albuterol    90 MICROgram(s) HFA Inhaler 2 Puff(s) Inhalation every 6 hours PRN  guaiFENesin Oral Liquid (Sugar-Free) 100 milliGRAM(s) Oral every 6 hours PRN    Hematalogic:  heparin   Injectable 5000 Unit(s) SubCutaneous every 8 hours    Other:  chlorhexidine 2% Cloths 1 Application(s) Topical <User Schedule>  dextrose 5%. 1000 milliLiter(s) IV Continuous <Continuous>  levothyroxine 50 MICROGram(s) Oral daily  pantoprazole    Tablet 40 milliGRAM(s) Oral before breakfast    albuterol    90 MICROgram(s) HFA Inhaler 2 Puff(s) Inhalation every 6 hours PRN  amLODIPine   Tablet 5 milliGRAM(s) Oral daily  cefepime   IVPB 1000 milliGRAM(s) IV Intermittent every 12 hours  chlorhexidine 2% Cloths 1 Application(s) Topical <User Schedule>  dextrose 5%. 1000 milliLiter(s) IV Continuous <Continuous>  guaiFENesin Oral Liquid (Sugar-Free) 100 milliGRAM(s) Oral every 6 hours PRN  heparin   Injectable 5000 Unit(s) SubCutaneous every 8 hours  levothyroxine 50 MICROGram(s) Oral daily  metoprolol succinate ER 25 milliGRAM(s) Oral daily  pantoprazole    Tablet 40 milliGRAM(s) Oral before breakfast    Drug Dosing Weight    Weight (kg): 63.458 (01 Jun 2023 17:52)    PHYSICAL EXAM:  GENERAL:   NAD, well-groomed  HEAD:    Atraumatic, Normocephalic  EYES:   EOMI, PERRLA, conjunctiva and sclera clear  ENMT:   No oropharyngeal exudates, erythema or lesions,  Moist mucous membranes  NECK:   Supple, no cervical lymphadenopathy, no JVD  NERVOUS SYSTEM:   Alert & Oriented X3, CN II-XII intact, Moves all extremities, full sensations to light touch   CHEST/LUNG:   Clear lung sounds to auscultation bilaterally. No wheezing or rhonchi.   HEART:    S1/S2 without murmurs, without rubs, or gallops.  ABDOMEN:   Soft, Nontender, Nondistended; Bowel sounds present, Bladder non distended, non palpable  EXTREMITIES:   Pulses palpable radial pulses 2+ bilat, DP/PT 1+/1+ bilat, without clubbing, cyanosis. SCDs on. Digits warm to touch with good cap refill < 3 secs  SKIN:  warm, dry, intact, normal color, no rash or abnormal lesions, without palpable nodes    LINES/DRAINS/DEVICES  CENTRAL LINE: [ ] YES [X ] NO  LOCATION:     [facility]: [X] YES [ ] NO     A-LINE:  [ ] YES [X ] NO  LOCATION:       ICU Vital Signs Last 24 Hrs  T(C): 36.6 (05 Jun 2023 04:00), Max: 36.9 (04 Jun 2023 15:20)  T(F): 97.8 (05 Jun 2023 04:00), Max: 98.5 (04 Jun 2023 15:20)  HR: 83 (05 Jun 2023 07:00) (83 - 114)  BP: 134/49 (05 Jun 2023 07:00) (106/69 - 156/75)  BP(mean): 71 (05 Jun 2023 07:00) (70 - 96)  ABP: --  ABP(mean): --  RR: 20 (05 Jun 2023 07:00) (15 - 25)  SpO2: 97% (05 Jun 2023 07:00) (90% - 99%)    O2 Parameters below as of 05 Jun 2023 06:00  Patient On (Oxygen Delivery Method): nasal cannula  O2 Flow (L/min): 2      06-04 @ 07:01  -  06-05 @ 07:00  --------------------------------------------------------  IN: 2078.2 mL / OUT: 2950 mL / NET: -871.8 mL        LABS:             CBC Full  -  ( 05 Jun 2023 04:07 )  WBC Count : 19.11 K/uL  RBC Count : 3.03 M/uL  Hemoglobin : 9.9 g/dL  Hematocrit : 31.0 %  Platelet Count - Automated : 206 K/uL  Mean Cell Volume : 102.3 fl  Mean Cell Hemoglobin : 32.7 pg  Mean Cell Hemoglobin Concentration : 31.9 gm/dL  Auto Neutrophil # : 15.10 K/uL  Auto Lymphocyte # : 1.11 K/uL  Auto Monocyte # : 1.23 K/uL  Auto Eosinophil # : 0.51 K/uL  Auto Basophil # : 0.07 K/uL  Auto Neutrophil % : 79.0 %  Auto Lymphocyte % : 5.8 %  Auto Monocyte % : 6.4 %  Auto Eosinophil % : 2.7 %  Auto Basophil % : 0.4 %      06-05    140  |  104  |  6<L>  ----------------------------<  108<H>  3.8   |  36<H>  |  0.20<L>    Ca    7.9<L>      05 Jun 2023 04:07  Phos  1.1     06-05  Mg     1.6     06-05    TPro  5.5<L>  /  Alb  1.9<L>  /  TBili  0.4  /  DBili  x   /  AST  42<H>  /  ALT  24  /  AlkPhos  116  06-05      Culture Results:   No growth (06-03 @ 13:55)    RADIOLOGY & ADDITIONAL STUDIES REVIEWED DURING TEAM ROUNDS    [ ]GOALS OF CARE DISCUSSION WITH PATIENT/FAMILY/PROXY:    CRITICAL CARE TIME SPENT: 35 minutes   INTERVAL HPI/OVERNIGHT EVENTS:   - Seen this AM at bedside. Pt is awake and alert, denies pain and following commands appropriately. No acute overnight events.     PRESSORS: [ ] YES [X] NO  WHICH:    ANTIBIOTICS: Cefepime 1g             DATE STARTED: 6/3/23    Antimicrobial:  cefepime   IVPB 1000 milliGRAM(s) IV Intermittent every 12 hours    Cardiovascular:  amLODIPine   Tablet 5 milliGRAM(s) Oral daily  metoprolol succinate ER 25 milliGRAM(s) Oral daily    Pulmonary:  albuterol    90 MICROgram(s) HFA Inhaler 2 Puff(s) Inhalation every 6 hours PRN  guaiFENesin Oral Liquid (Sugar-Free) 100 milliGRAM(s) Oral every 6 hours PRN    Hematalogic:  heparin   Injectable 5000 Unit(s) SubCutaneous every 8 hours    Other:  chlorhexidine 2% Cloths 1 Application(s) Topical <User Schedule>  dextrose 5%. 1000 milliLiter(s) IV Continuous <Continuous>  levothyroxine 50 MICROGram(s) Oral daily  pantoprazole    Tablet 40 milliGRAM(s) Oral before breakfast    albuterol    90 MICROgram(s) HFA Inhaler 2 Puff(s) Inhalation every 6 hours PRN  amLODIPine   Tablet 5 milliGRAM(s) Oral daily  cefepime   IVPB 1000 milliGRAM(s) IV Intermittent every 12 hours  chlorhexidine 2% Cloths 1 Application(s) Topical <User Schedule>  dextrose 5%. 1000 milliLiter(s) IV Continuous <Continuous>  guaiFENesin Oral Liquid (Sugar-Free) 100 milliGRAM(s) Oral every 6 hours PRN  heparin   Injectable 5000 Unit(s) SubCutaneous every 8 hours  levothyroxine 50 MICROGram(s) Oral daily  metoprolol succinate ER 25 milliGRAM(s) Oral daily  pantoprazole    Tablet 40 milliGRAM(s) Oral before breakfast    Drug Dosing Weight    Weight (kg): 63.458 (01 Jun 2023 17:52)    PHYSICAL EXAM:  GENERAL:   NAD, well-groomed  HEAD:    Atraumatic, Normocephalic  EYES:   EOMI, PERRLA, conjunctiva and sclera clear  ENMT:   No oropharyngeal exudates, erythema or lesions,  Moist mucous membranes  NECK:   Supple, no cervical lymphadenopathy, no JVD  NERVOUS SYSTEM:   Alert & Oriented X3, CN II-XII intact, Moves all extremities, full sensations to light touch   CHEST/LUNG:   Clear lung sounds to auscultation bilaterally. No wheezing or rhonchi.   HEART:    S1/S2 without murmurs, without rubs, or gallops.  ABDOMEN:   Soft, Nontender, Nondistended; Bowel sounds present, Bladder non distended, non palpable  EXTREMITIES:   Pulses palpable radial pulses 2+ bilat, DP/PT 1+/1+ bilat, without clubbing, cyanosis. SCDs on. Digits warm to touch with good cap refill < 3 secs  SKIN:  warm, dry, intact, normal color, no rash or abnormal lesions, without palpable nodes    LINES/DRAINS/DEVICES  CENTRAL LINE: [ ] YES [X ] NO  LOCATION:     [facility]: [X] YES [ ] NO     A-LINE:  [ ] YES [X ] NO  LOCATION:       ICU Vital Signs Last 24 Hrs  T(C): 36.6 (05 Jun 2023 04:00), Max: 36.9 (04 Jun 2023 15:20)  T(F): 97.8 (05 Jun 2023 04:00), Max: 98.5 (04 Jun 2023 15:20)  HR: 83 (05 Jun 2023 07:00) (83 - 114)  BP: 134/49 (05 Jun 2023 07:00) (106/69 - 156/75)  BP(mean): 71 (05 Jun 2023 07:00) (70 - 96)  ABP: --  ABP(mean): --  RR: 20 (05 Jun 2023 07:00) (15 - 25)  SpO2: 97% (05 Jun 2023 07:00) (90% - 99%)    O2 Parameters below as of 05 Jun 2023 06:00  Patient On (Oxygen Delivery Method): nasal cannula  O2 Flow (L/min): 2      06-04 @ 07:01  -  06-05 @ 07:00  --------------------------------------------------------  IN: 2078.2 mL / OUT: 2950 mL / NET: -871.8 mL      LABS:             CBC Full  -  ( 05 Jun 2023 04:07 )  WBC Count : 19.11 K/uL  RBC Count : 3.03 M/uL  Hemoglobin : 9.9 g/dL  Hematocrit : 31.0 %  Platelet Count - Automated : 206 K/uL  Mean Cell Volume : 102.3 fl  Mean Cell Hemoglobin : 32.7 pg  Mean Cell Hemoglobin Concentration : 31.9 gm/dL  Auto Neutrophil # : 15.10 K/uL  Auto Lymphocyte # : 1.11 K/uL  Auto Monocyte # : 1.23 K/uL  Auto Eosinophil # : 0.51 K/uL  Auto Basophil # : 0.07 K/uL  Auto Neutrophil % : 79.0 %  Auto Lymphocyte % : 5.8 %  Auto Monocyte % : 6.4 %  Auto Eosinophil % : 2.7 %  Auto Basophil % : 0.4 %      06-05    140  |  104  |  6<L>  ----------------------------<  108<H>  3.8   |  36<H>  |  0.20<L>    Ca    7.9<L>      05 Jun 2023 04:07  Phos  1.1     06-05  Mg     1.6     06-05    TPro  5.5<L>  /  Alb  1.9<L>  /  TBili  0.4  /  DBili  x   /  AST  42<H>  /  ALT  24  /  AlkPhos  116  06-05      Culture Results:   No growth (06-03 @ 13:55)    RADIOLOGY & ADDITIONAL STUDIES REVIEWED DURING TEAM ROUNDS    [ ]GOALS OF CARE DISCUSSION WITH PATIENT/FAMILY/PROXY:    CRITICAL CARE TIME SPENT: 35 minutes

## 2023-06-05 NOTE — PROGRESS NOTE ADULT - ASSESSMENT
Patient is a 83yo Female with Asthma, Essential HTN, GERD, constipation, Iron def Anemia, Hypothyroidism and recent Rt  hip replacement p/w  AMS and hypoxia. Pt a/w CHIP and hyperkalemia with b/l hydronephrosis s/p benavides placement in ER with bipin pus. Nephrology consulted for Elevated serum creatinine.    1. CHIP- CHIP 2/2 Obstructive uropathy now resolved. CT abd/pelvis with distended bladder and mild b/l hydro. s/p benavides placement with post-obstructive diuresis now improving (UO 3L). Recc to change IVF to D5 1/2 NS @ 65 ml/hour. Recc to repeat renal US to monitor for resolution. Pt given Kphos IVf. Monitor K/Mg/Phos daily; replete prn. Strict I/Os. Avoid nephrotoxins/ NSAIDs/ RCA. Monitor BMP.    2. Obstructive uropathy. See above.     3. Hyperkalemia- resolved.     4. Sepsis 2/2 UTI- +UA, Pt on Cefepime. UCx and BCx [facility] NEPHROLOGY  Chaka Brooks M.D.  Ildefonso Gonzalez D.O.  Dana Kent M.D.  Jayne Stratton, MSN, ANP-C  [phone]  Fax: [phone] [place] [address], [id number]  [place] [address]

## 2023-06-05 NOTE — PROGRESS NOTE ADULT - SUBJECTIVE AND OBJECTIVE BOX
[facility] NEPHROLOGY- PROGRESS NOTE    Patient is a 83yo Female with Asthma, Essential HTN, GERD, constipation, Iron def Anemia, Hypothyroidism and recent Rt  hip replacement p/w AMS and hypoxia. Pt a/w CHIP and hyperkalemia with b/l hydronephrosis s/p benavides placement in ER with bipin pus. Nephrology consulted for Elevated serum creatinine.    REVIEW OF SYSTEMS: limited due to mental status but patient denies any complaints.      VITALS:  T(F): 98 (23 @ 08:00), Max: 98.5 (23 @ 15:20)  HR: 96 (23 @ 13:00)  BP: 140/67 (23 @ 13:00)  RR: 21 (23 @ 13:00)  SpO2: 94% (23 @ 13:00)  Wt(kg): --     @ 07:01  -   @ 07:00  --------------------------------------------------------  IN: 2078.2 mL / OUT: 3000 mL / NET: -921.8 mL     @ 07:01  -   @ 13:45  --------------------------------------------------------  IN: 350 mL / OUT: 420 mL / NET: -70 mL        PHYSICAL EXAM:  Gen: NAD  Cards: RRR, +S1/S2,   Resp: CTA B/L  GI: soft, NT  : +benavides   Extremities: no LLE edema, RLE in brace      LABS:      140  |  104  |  6<L>  ----------------------------<  108<H>  3.8   |  36<H>  |  0.20<L>    Ca    7.9<L>      2023 04:07  Phos  1.1     06-05  Mg     1.6         TPro  5.5<L>  /  Alb  1.9<L>  /  TBili  0.4  /  DBili      /  AST  42<H>  /  ALT  24  /  AlkPhos  116      Creatinine Trend: 0.20 <--, 0.26 <--, 0.31 <--, 0.26 <--, 1.15 <--, 2.90 <--, 3.28 <--, 4.83 <--, 6.76 <--, 7.86 <--                        9.9    19.11 )-----------( 206      ( 2023 04:07 )             31.0     Urine Studies:  Urinalysis Basic - ( 2023 20:55 )    Color: Yellow / Appearance: Slightly Turbid / S.015 / pH:   Gluc:  / Ketone: Negative  / Bili: Negative / Urobili: Negative   Blood:  / Protein: 30 mg/dL / Nitrite: Negative   Leuk Esterase: Moderate / RBC: 10-25 /HPF / WBC >50 /HPF   Sq Epi:  / Non Sq Epi:  / Bacteria: Moderate /HPF      Sodium, Random Urine: 35 mmol/L ( @ 20:55)  Osmolality, Random Urine: 365 mos/kg ( @ 20:55)  Potassium, Random Urine: 39 mmol/L ( @ 20:55)  Creatinine, Random Urine: 78 mg/dL ( @ 20:55)

## 2023-06-05 NOTE — PHYSICAL THERAPY INITIAL EVALUATION ADULT - DIAGNOSIS, PT EVAL
Patient presented with generalized weakness, impaired ROM/muscle strength on R hip, impairments in sitting balance and was unable to perform OOB transfers at this time

## 2023-06-05 NOTE — PROGRESS NOTE ADULT - ASSESSMENT
Assessment  83 yo F w/ Hx Asthma, primary HTN, GERD, constipation, Fe def Anemia, Hypothyroidism and recent hip replacement who was sent in from NH for AMS and hypoxia. Admit for acute renal failure w/ hyperkalemia 2/2 urinary obstruction and AHRF w/ c/f PE in setting of recent joint replacement.     PLAN:    -----------------CNS:------------------  #Acute metabolic encephalopathy  in setting of urine retention and uremia  c/f UTI  resolve as below  CT head w/out acute finding, unremarkable     -----------------CVS:------------------  #Primary HTN  HOLDING home meds, BP soft on admission  (amlodipine, metoprolol, losartan)    -----------------RESPIRATORY:--------  #AHRF  c/f PE?, does not appear to be in asthma exac, no COPD or HF hx  family reports patient is baseline on 2L oxygen   > now tolerating 3L  cont duonebs q6  f/u ABG  titrate O2 as tolerates  no need for heparin drip at this time  f/u doppler b/l LE      #Asthma  On albuterol at home  will cont duonebs    -----------------GI:----------------------  NPO for now while encephalopathic  accucheck q 6    #GERD  cont PPI    #Constipation  cont bowel regimen when rick PO    -----------------ID:-----------------------  #suspect UTI  UA appears purulent  f/u UA UCx BCx  s/p Cefepime x 1  cont renal dosed Cefepime 1g q 24    -----------------RENAL: ---------------  #ARF  new onset BUN Cr 173/ 7.86 in setting of obstruction  CT showing obstruction urine retention and b/l hydronephrosis  s/p 2L ivf bolus  placed benavides > will clamp after 1L o/p  monitor urine output  f/u urine lytes  NEPHRO Dr. Brooks consulted/ Dr. Kent consulted    #Hyperkalemia  K 7.2 on admission, EKG without peaked T waves  s/p insulin 5, dextrose and 2L IVF bolus    -----------------ENDO:------------------  #Hypothyroidism  on PO synthroid 50  cont IV syntrhoid 37.5 mcg QHS    -----------------HEME/ONC: ---------------  #Fe def anemia  cont home iron when rick PO    -----------------EXTREMITIES:-------  Peripheral IV  Benavides placed in ED 6/1 for acute retention    -----------------PROPHYLAXIS: -------    DVT heparin drip  GI PPI IV QD    -----------------DISPOSITION--------  ICU Assessment  83 yo F w/ Hx Asthma, primary HTN, GERD, constipation, REZA, Hypothyroidism and recent hip replacement who was sent in from NH for AMS and hypoxia. Admit for acute renal failure w/ hyperkalemia 2/2 urinary obstruction and AHRF w/ c/f PE in setting of recent joint replacement.     PLAN:    -----------------CNS:------------------  #Acute metabolic encephalopathy  in setting of urine retention and uremia  c/f UTI  resolve as below  CT head w/out acute finding, unremarkable     -----------------CVS:------------------  #Primary HTN  HOLDING home meds, BP soft on admission  (amlodipine, metoprolol, losartan)    -----------------RESPIRATORY:--------  #AHRF  c/f PE?, does not appear to be in asthma exac, no COPD or HF hx  family reports patient is baseline on 2L oxygen   > now tolerating 3L  cont duonebs q6  f/u ABG  titrate O2 as tolerates  no need for heparin drip at this time  f/u doppler b/l LE      #Asthma  On albuterol at home  will cont duonebs    -----------------GI:----------------------  NPO for now while encephalopathic  accucheck q 6    #GERD  cont PPI    #Constipation  cont bowel regimen when rick PO    -----------------ID:-----------------------  #suspect UTI  UA appears purulent  f/u UA UCx BCx  s/p Cefepime x 1  cont renal dosed Cefepime 1g q 24    -----------------RENAL: ---------------  #ARF  new onset BUN Cr 173/ 7.86 in setting of obstruction  CT showing obstruction urine retention and b/l hydronephrosis  s/p 2L ivf bolus  placed benavides > will clamp after 1L o/p  monitor urine output  f/u urine lytes  NEPHRO Dr. Brooks consulted/ Dr. Kent consulted    #Hyperkalemia  K 7.2 on admission, EKG without peaked T waves  s/p insulin 5, dextrose and 2L IVF bolus    -----------------ENDO:------------------  #Hypothyroidism  on PO synthroid 50  cont IV syntrhoid 37.5 mcg QHS    -----------------HEME/ONC: ---------------  #Fe def anemia  cont home iron when rick PO    -----------------EXTREMITIES:-------  Peripheral IV  Benavides placed in ED 6/1 for acute retention    -----------------PROPHYLAXIS: -------    DVT heparin drip  GI PPI IV QD    -----------------DISPOSITION--------  ICU Assessment  83 yo F w/ Hx Asthma, primary HTN, GERD, constipation, REZA, Hypothyroidism and recent hip replacement who was sent in from NH for AMS and hypoxia. Admit for acute renal failure w/ hyperkalemia 2/2 obstructive uropathy and AHRF w/ c/f PE in setting of recent joint replacement.     PLAN:    -----------------Neuro:------------------  #Acute metabolic encephalopathy  in setting of urine retention, uremia, and cf UTI  - resolved as below  - CT head w/out acute finding, unremarkable     -----------------CVS:------------------  #Primary HTN  - CW Home BP meds  - Metop 12.5mg BID, Amlodipine 5mg qd  - Consider restarting Losartan if remains hypertenisve    -----------------RESPIRATORY:--------  #AHRF  c/f PE?, does not appear to be in asthma exac, no COPD or HF hx  family reports patient is baseline on 2L oxygen   > now tolerating 3L  cont duonebs q6  f/u ABG  titrate O2 as tolerates  no need for heparin drip at this time  f/u doppler b/l LE      #Asthma  On albuterol at home  will cont duonebs    -----------------GI:----------------------  NPO for now while encephalopathic  accucheck q 6    #GERD  cont PPI    #Constipation  cont bowel regimen when rick PO    -----------------ID:-----------------------  #suspect UTI  UA appears purulent  f/u UA UCx BCx  s/p Cefepime x 1  cont renal dosed Cefepime 1g q 24    -----------------RENAL: ---------------  #ARF  new onset BUN Cr 173/ 7.86 in setting of obstruction  CT showing obstruction urine retention and b/l hydronephrosis  s/p 2L ivf bolus  placed benavides > will clamp after 1L o/p  monitor urine output  f/u urine lytes  NEPHRO Dr. Brooks consulted/ Dr. Kent consulted    #Hyperkalemia  K 7.2 on admission, EKG without peaked T waves  s/p insulin 5, dextrose and 2L IVF bolus    -----------------ENDO:------------------  #Hypothyroidism  on PO synthroid 50  cont IV syntrhoid 37.5 mcg QHS    -----------------HEME/ONC: ---------------  #Fe def anemia  cont home iron when rick PO    -----------------EXTREMITIES:-------  Peripheral IV  Benavides placed in ED 6/1 for acute retention    -----------------PROPHYLAXIS: -------    DVT heparin drip  GI PPI IV QD    -----------------DISPOSITION--------  ICU Assessment  83 yo F w/ Hx Asthma, primary HTN, GERD, constipation, REZA, Hypothyroidism and recent hip replacement who was sent in from NH for AMS and hypoxia. Admit for acute renal failure w/ hyperkalemia 2/2 obstructive uropathy and AHRF w/ c/f PE in setting of recent joint replacement.     PLAN:    -----------------Neuro:------------------  #Acute metabolic encephalopathy  in setting of urine retention, uremia, and cf UTI  - resolved as below  - CT head w/out acute finding, unremarkable     -----------------CVS:------------------  #Primary HTN  - CW Home BP meds  - Metop 12.5mg BID, Amlodipine 5mg qd  - Consider restarting Losartan if remains hypertensive    -----------------RESPIRATORY:--------  #AHRF  c/f PE?, does not appear to be in asthma exac, no COPD or HF hx  family reports patient is baseline on 2L oxygen   > now tolerating 3L  - cont duonebs q6  - no need for heparin drip at this time  - B/L LE US negative for DVT    #Asthma  - On albuterol at home  - cont duonebs    -----------------GI:----------------------  - Pureed diet     #GERD  - cont PPI    #Constipation  - cont bowel regimen when rick PO    -----------------ID:-----------------------  #suspect UTI  UA appears purulent  - UA +, UCx negative x2  - s/p Cefepime x 1  - cont renal dosed Cefepime 1g q 24    -----------------RENAL: ---------------  #Acute obstructive nephropathy  - new onset BUN Cr 173/ 7.86 in setting of obstruction  - CT showing obstructive urinary retention and b/l hydronephrosis  - s/p 2L IVF bolus and benavides placement  - Medically necessary benavides for prevention of obstructive nephropathy  - F/u Renal Ultrasound  - Urine output 1.5L ON, approx 90cc/hr  - NEPHRO Dr. Brooks consulted/ Dr. Kent consulted    #Hyperkalemia (Resolved)  - K 7.2 on admission, EKG without peaked T waves  - s/p insulin 5, dextrose and 2L IVF bolus    -----------------ENDO:------------------  #Hypothyroidism  - on home dose PO synthroid 50 mcg qd    -----------------HEME/ONC: ---------------  #Fe def anemia  - cont home iron    -----------------EXTREMITIES:-------  Peripheral IV  Benavides placed in ED 6/1 for acute retention    -----------------PROPHYLAXIS: -------    -DVT heparin drip  -GI PPI IV QD  - PT/OT consult    -----------------DISPOSITION--------  Medicine  Assessment  81 yo F w/ Hx Asthma, primary HTN, GERD, constipation, REZA, Hypothyroidism and recent hip replacement who was sent in from NH for AMS and hypoxia. Admit for acute renal failure w/ hyperkalemia 2/2 obstructive uropathy and AHRF w in setting of acute metabolic encephalopathy.    PLAN:    -----------------Neuro:------------------  #Acute metabolic encephalopathy  in setting of urine retention, uremia, and cf UTI  - resolved as below  - CT head w/out acute finding, unremarkable     -----------------CVS:------------------  #Primary HTN  - CW Home BP meds  - Metop 12.5mg BID, Amlodipine 5mg qd  - Consider restarting Losartan if remains hypertensive    -----------------RESPIRATORY:--------  #AHRF  > now tolerating 3L  - cont duonebs q6  - no need for heparin drip at this time  - B/L LE US negative for DVT    #Asthma  - On albuterol at home  - cont duonebs    -----------------GI:----------------------  - Pureed diet     #GERD  - cont PPI    #Constipation  - cont bowel regimen when rick PO    -----------------ID:-----------------------  #suspect UTI  UA appears purulent  - UA +, UCx negative x2  - s/p Cefepime x 1  - cont renal dosed Cefepime 1g q 24    -----------------RENAL: ---------------  #Acute obstructive uropathy   - new onset BUN Cr 173/ 7.86 in setting of obstruction  - CT showing obstructive urinary retention and b/l hydronephrosis  - s/p 2L IVF bolus and benavides placement  - Medically necessary benavides for prevention of obstructive nephropathy  - F/u Renal Ultrasound  - Urine output 1.5L ON, approx 90cc/hr  - NEPHRO Dr. Brokos consulted/ Dr. Kent consulted    #Hyperkalemia (Resolved)  - K 7.2 on admission, EKG without peaked T waves  - s/p insulin 5, dextrose and 2L IVF bolus    -----------------ENDO:------------------  #Hypothyroidism  - on home dose PO synthroid 50 mcg qd    -----------------HEME/ONC: ---------------  #Fe def anemia  - cont home iron    -----------------EXTREMITIES:-------  Peripheral IV  Benavides placed in ED 6/1 for acute retention    -----------------PROPHYLAXIS: -------    -DVT heparin drip  -GI PPI IV QD  - PT/OT consult    -----------------DISPOSITION--------  Medicine

## 2023-06-05 NOTE — CHART NOTE - NSCHARTNOTEFT_GEN_A_CORE
Spoke with daughter Rosey Macdonald [phone] over phone and updated about the patient current condition in details , All questions were answered and gave her direct extension to the unit to call for any concerns. Also Updated son Jhon at bed side.

## 2023-06-06 LAB
ANION GAP SERPL CALC-SCNC: 2 MMOL/L — LOW (ref 5–17)
BUN SERPL-MCNC: 8 MG/DL — SIGNIFICANT CHANGE UP (ref 7–18)
CALCIUM SERPL-MCNC: 8.3 MG/DL — LOW (ref 8.4–10.5)
CHLORIDE SERPL-SCNC: 102 MMOL/L — SIGNIFICANT CHANGE UP (ref 96–108)
CO2 SERPL-SCNC: 33 MMOL/L — HIGH (ref 22–31)
CREAT SERPL-MCNC: 0.27 MG/DL — LOW (ref 0.5–1.3)
EGFR: 109 ML/MIN/1.73M2 — SIGNIFICANT CHANGE UP
GLUCOSE BLDC GLUCOMTR-MCNC: 135 MG/DL — HIGH (ref 70–99)
GLUCOSE SERPL-MCNC: 94 MG/DL — SIGNIFICANT CHANGE UP (ref 70–99)
HCT VFR BLD CALC: 39.4 % — SIGNIFICANT CHANGE UP (ref 34.5–45)
HGB BLD-MCNC: 12.6 G/DL — SIGNIFICANT CHANGE UP (ref 11.5–15.5)
MAGNESIUM SERPL-MCNC: 1.8 MG/DL — SIGNIFICANT CHANGE UP (ref 1.6–2.6)
MCHC RBC-ENTMCNC: 32 GM/DL — SIGNIFICANT CHANGE UP (ref 32–36)
MCHC RBC-ENTMCNC: 33.2 PG — SIGNIFICANT CHANGE UP (ref 27–34)
MCV RBC AUTO: 103.7 FL — HIGH (ref 80–100)
NRBC # BLD: 0 /100 WBCS — SIGNIFICANT CHANGE UP (ref 0–0)
PHOSPHATE SERPL-MCNC: 2.2 MG/DL — LOW (ref 2.5–4.5)
PLATELET # BLD AUTO: 190 K/UL — SIGNIFICANT CHANGE UP (ref 150–400)
POTASSIUM SERPL-MCNC: 4.2 MMOL/L — SIGNIFICANT CHANGE UP (ref 3.5–5.3)
POTASSIUM SERPL-SCNC: 4.2 MMOL/L — SIGNIFICANT CHANGE UP (ref 3.5–5.3)
RBC # BLD: 3.8 M/UL — SIGNIFICANT CHANGE UP (ref 3.8–5.2)
RBC # FLD: 12 % — SIGNIFICANT CHANGE UP (ref 10.3–14.5)
SODIUM SERPL-SCNC: 137 MMOL/L — SIGNIFICANT CHANGE UP (ref 135–145)
WBC # BLD: 16.13 K/UL — HIGH (ref 3.8–10.5)
WBC # FLD AUTO: 16.13 K/UL — HIGH (ref 3.8–10.5)

## 2023-06-06 RX ORDER — FERROUS SULFATE 325(65) MG
325 TABLET ORAL DAILY
Refills: 0 | Status: DISCONTINUED | OUTPATIENT
Start: 2023-06-06 | End: 2023-06-12

## 2023-06-06 RX ORDER — SODIUM,POTASSIUM PHOSPHATES 278-250MG
1 POWDER IN PACKET (EA) ORAL ONCE
Refills: 0 | Status: COMPLETED | OUTPATIENT
Start: 2023-06-06 | End: 2023-06-06

## 2023-06-06 RX ORDER — PANTOPRAZOLE SODIUM 20 MG/1
40 TABLET, DELAYED RELEASE ORAL DAILY
Refills: 0 | Status: DISCONTINUED | OUTPATIENT
Start: 2023-06-06 | End: 2023-06-12

## 2023-06-06 RX ADMIN — HEPARIN SODIUM 5000 UNIT(S): 5000 INJECTION INTRAVENOUS; SUBCUTANEOUS at 22:17

## 2023-06-06 RX ADMIN — PANTOPRAZOLE SODIUM 40 MILLIGRAM(S): 20 TABLET, DELAYED RELEASE ORAL at 11:39

## 2023-06-06 RX ADMIN — TIOTROPIUM BROMIDE 2 PUFF(S): 18 CAPSULE ORAL; RESPIRATORY (INHALATION) at 11:39

## 2023-06-06 RX ADMIN — ATORVASTATIN CALCIUM 10 MILLIGRAM(S): 80 TABLET, FILM COATED ORAL at 22:17

## 2023-06-06 RX ADMIN — CEFEPIME 100 MILLIGRAM(S): 1 INJECTION, POWDER, FOR SOLUTION INTRAMUSCULAR; INTRAVENOUS at 17:24

## 2023-06-06 RX ADMIN — AMLODIPINE BESYLATE 5 MILLIGRAM(S): 2.5 TABLET ORAL at 05:37

## 2023-06-06 RX ADMIN — Medication 50 MICROGRAM(S): at 05:37

## 2023-06-06 RX ADMIN — Medication 12.5 MILLIGRAM(S): at 05:37

## 2023-06-06 RX ADMIN — Medication 12.5 MILLIGRAM(S): at 17:24

## 2023-06-06 RX ADMIN — Medication 325 MILLIGRAM(S): at 11:39

## 2023-06-06 RX ADMIN — HEPARIN SODIUM 5000 UNIT(S): 5000 INJECTION INTRAVENOUS; SUBCUTANEOUS at 05:36

## 2023-06-06 RX ADMIN — BUDESONIDE AND FORMOTEROL FUMARATE DIHYDRATE 2 PUFF(S): 160; 4.5 AEROSOL RESPIRATORY (INHALATION) at 22:17

## 2023-06-06 RX ADMIN — BUDESONIDE AND FORMOTEROL FUMARATE DIHYDRATE 2 PUFF(S): 160; 4.5 AEROSOL RESPIRATORY (INHALATION) at 09:26

## 2023-06-06 RX ADMIN — HEPARIN SODIUM 5000 UNIT(S): 5000 INJECTION INTRAVENOUS; SUBCUTANEOUS at 14:24

## 2023-06-06 RX ADMIN — CEFEPIME 100 MILLIGRAM(S): 1 INJECTION, POWDER, FOR SOLUTION INTRAMUSCULAR; INTRAVENOUS at 05:36

## 2023-06-06 RX ADMIN — Medication 1 PACKET(S): at 05:38

## 2023-06-06 RX ADMIN — CHLORHEXIDINE GLUCONATE 1 APPLICATION(S): 213 SOLUTION TOPICAL at 05:38

## 2023-06-06 NOTE — SWALLOW BEDSIDE ASSESSMENT ADULT - ORAL PHASE
Oral residue Oral residue 90% cleared with liquid wash oral residue Within functional limits oral residue cleared with liquid wash

## 2023-06-06 NOTE — SWALLOW BEDSIDE ASSESSMENT ADULT - COMMENTS
2-3 swallows per PO intake. HOB elevated to 90°. AA+Ox3; pt awake, responsive to all SLP directions and queries. Son at bedside, at times translate to [place] for pt. Pt reports pain and difficulty when swallowing PTA, now resolved. Extended neck at baseline, lifts chin when swallowing; can cue for chin tuck. Pt coughed on large bolus intake, lifts chin significantly. No cough observed on cued smaller sips with chin tuck. 2 swallows per PO intake.

## 2023-06-06 NOTE — PROGRESS NOTE ADULT - SUBJECTIVE AND OBJECTIVE BOX
[facility] NEPHROLOGY- PROGRESS NOTE    Patient is a 83yo Female with Asthma, Essential HTN, GERD, constipation, Iron def Anemia, Hypothyroidism and recent Rt  hip replacement p/w AMS and hypoxia. Pt a/w CHIP and hyperkalemia with b/l hydronephrosis s/p benavides placement in ER with bipin pus. Nephrology consulted for Elevated serum creatinine.    REVIEW OF SYSTEMS: limited:  patient denies any complaints- no SOB, or pain.      VITALS:  T(F): 96.1 (23 @ 07:00), Max: 98.2 (23 @ 16:00)  HR: 90 (23 @ 14:00)  BP: 120/53 (23 @ 14:00)  RR: 21 (23 @ 14:00)  SpO2: 99% (23 @ 14:00)  Wt(kg): --     @ 07:01  -   @ 07:00  --------------------------------------------------------  IN: 630 mL / OUT: 1975 mL / NET: -1345 mL      PHYSICAL EXAM:  Gen: NAD  Cards: RRR, +S1/S2,   Resp: CTA B/L  GI: soft, NT  : +benavides   Extremities: no LLE edema, RLE in brace      LABS:      137  |  102  |  8   ----------------------------<  94  4.2   |  33<H>  |  0.27<L>    Ca    8.3<L>      2023 04:27  Phos  2.2       Mg     1.8         TPro  5.5<L>  /  Alb  1.9<L>  /  TBili  0.4  /  DBili      /  AST  42<H>  /  ALT  24  /  AlkPhos  116      Creatinine Trend: 0.27 <--, 0.20 <--, 0.26 <--, 0.31 <--, 0.26 <--, 1.15 <--, 2.90 <--, 3.28 <--, 4.83 <--, 6.76 <--, 7.86 <--                        12.6   16.13 )-----------( 190      ( 2023 04:27 )             39.4     Urine Studies:  Urinalysis Basic - ( 2023 20:55 )    Color: Yellow / Appearance: Slightly Turbid / S.015 / pH:   Gluc:  / Ketone: Negative  / Bili: Negative / Urobili: Negative   Blood:  / Protein: 30 mg/dL / Nitrite: Negative   Leuk Esterase: Moderate / RBC: 10-25 /HPF / WBC >50 /HPF   Sq Epi:  / Non Sq Epi:  / Bacteria: Moderate /HPF      Sodium, Random Urine: 35 mmol/L ( @ 20:55)  Osmolality, Random Urine: 365 mos/kg ( @ 20:55)  Potassium, Random Urine: 39 mmol/L ( @ 20:55)  Creatinine, Random Urine: 78 mg/dL ( @ 20:55)

## 2023-06-06 NOTE — SWALLOW BEDSIDE ASSESSMENT ADULT - SWALLOW EVAL: RECOMMENDED FEEDING/EATING TECHNIQUES
allow for swallow between intakes/check mouth frequently for oral residue/pocketing/crush medication (when feasible)/hard swallow w/ each bite or sip/maintain upright posture during/after eating for 30 mins/oral hygiene/position upright (90 degrees)/small sips/bites/tuck chin

## 2023-06-06 NOTE — CHART NOTE - NSCHARTNOTEFT_GEN_A_CORE
Assessment:   Patient is a 82y old  Female who presents with a chief complaint of Hyperkalemia, Acute/Chronic renal failure (2023 14:20). Pt seen for follow-up. Discussed on ICU IDR, pt for D/G and SLP. Pt now S/p SLP, diet upgraded. Pt visited, asleep. Able to visualize clavicles and ribs (indicating loss body fat (ribs) and loss muscle mass. RN reports pt w/ fair intake. Discussed w/ NP and PGY 1, Ensure Compact BID added.          Diet Prescription: Soft/ bite size, DASH/ TLC w/ Ensure Compact BID  Intake:     Daily     Daily Weight in k.3 (2023 06:00)  Weight in k.3 (2023 08:00)  Weight in k.6 (2023 07:00)  Weight in k.1 (2023 07:00)  Weight in k.8 (2023 07:00)    % Weight Change: O>I, 2+ right hip edema    Pertinent Medications: MEDICATIONS  (STANDING):  amLODIPine   Tablet 5 milliGRAM(s) Oral daily  atorvastatin 10 milliGRAM(s) Oral at bedtime  budesonide  80 MICROgram(s)/formoterol 4.5 MICROgram(s) Inhaler 2 Puff(s) Inhalation two times a day  cefepime   IVPB 1000 milliGRAM(s) IV Intermittent every 12 hours  chlorhexidine 2% Cloths 1 Application(s) Topical <User Schedule>  ferrous    sulfate 325 milliGRAM(s) Oral daily  heparin   Injectable 5000 Unit(s) SubCutaneous every 8 hours  levothyroxine 50 MICROGram(s) Oral daily  metoprolol tartrate 12.5 milliGRAM(s) Oral two times a day  pantoprazole   Suspension 40 milliGRAM(s) Oral daily  tiotropium 2.5 MICROgram(s) Inhaler 2 Puff(s) Inhalation daily    MEDICATIONS  (PRN):  albuterol    90 MICROgram(s) HFA Inhaler 2 Puff(s) Inhalation every 6 hours PRN Shortness of Breath and/or Wheezing    Pertinent Labs:  Na137 mmol/L Glu 94 mg/dL K+ 4.2 mmol/L Cr  0.27 mg/dL<L> BUN 8 mg/dL  Phos 2.2 mg/dL<L>  Alb 1.9 g/dL<L>     CAPILLARY BLOOD GLUCOSE      POCT Blood Glucose.: 127 mg/dL (2023 11:20)  POCT Blood Glucose.: 86 mg/dL (2023 16:37)        Estimated Needs:   [ ] no change since previous assessment  [x ] recalculated: at 53.3 kg 25- 28 kcals/ day= 1332.5-1492 kcals/ day. Reassessed protein needs are 64-80 (1.2-1.5 gm/ kg)      Previous Nutrition Diagnosis:   [ ] Altered GI function  [x ]Inadequate Oral Intake [ ] Swallowing Difficulty   [ ] Altered nutrition related labs [ ] Increased Nutrient Needs [ ] Overweight/Obesity   [ ] Unintended Weight Loss [ ] Food & Nutrition Related Knowledge Deficit [ ] Malnutrition   [ ] Other:     Nutrition Diagnosis is [ ] ongoing  [ ] resolved [ ] not applicable     New Nutrition Diagnosis: [x ] PCM (severe) of acute illness (<3 months) related to advanced age w/ recent hip fx, multiple facility changes (hospitalizations, rehab), asthma, CHIP as evidenced by <50 % needs met> 5 days, loss body fat and muscle mass (at least moderate)      Interventions:   Recommend  [ ] Change Diet To:  [x ] Nutrition Supplement: Ensure Compact BID added  [ ] Nutrition Support  [x ] Other: MD to monitor. RD available.     Monitoring and Evaluation:   [x ] PO intake [ x ] Tolerance to diet prescription [ x ] weights [ x ] labs[ x ] follow up per protocol  [ ] other:

## 2023-06-06 NOTE — SWALLOW BEDSIDE ASSESSMENT ADULT - NS SPL SWALLOW CLINIC TRIAL FT
2 swallows per PO intake; lifts chin more significantly on second swallow but denies effortful swallow. Cued for chin tuck

## 2023-06-06 NOTE — PROGRESS NOTE ADULT - ASSESSMENT
Assessment  83 yo F w/ Hx Asthma, primary HTN, GERD, constipation, REZA, Hypothyroidism and recent hip replacement who was sent in from NH for AMS and hypoxia. Admit for acute renal failure w/ hyperkalemia 2/2 obstructive uropathy and AHRF w in setting of acute metabolic encephalopathy.    PLAN:    -----------------Neuro:------------------  #Acute metabolic encephalopathy  in setting of urine retention, uremia, and cf UTI  - resolved as below  - CT head w/out acute finding, unremarkable     -----------------CVS:------------------  #Primary HTN  - CW Home BP meds  - Metop 12.5mg BID, Amlodipine 5mg qd  - Consider restarting Losartan if remains hypertensive    -----------------RESPIRATORY:--------  #AHRF  > now tolerating 3L  - cont duonebs q6h  - B/L LE US negative for DVT    #Asthma  - On albuterol at home  - cont duonebs    -----------------GI:----------------------  - Pureed diet, advance diet?    #GERD  - cont PPI    #Constipation  - cont bowel regimen     -----------------ID:-----------------------  #suspect UTI  UA appears purulent  - UA +, UCx negative x2  - s/p Cefepime x 1  - cont renal dosed Cefepime 1g q 24 for 10 days (6/3-6/10)    -----------------RENAL: ---------------  #Acute obstructive uropathy   - new onset BUN Cr 173/ 7.86 in setting of obstruction  - CT showing obstructive urinary retention and b/l hydronephrosis  - Medically necessary benavides for prevention of obstructive nephropathy  - F/u Renal Ultrasound  - Urine output 1.5L ON, approx 90cc/hr  - NEPHRO Dr. Brooks consulted/ Dr. Kent consulted    #Hyperkalemia (Resolved)  - K 7.2 on admission, EKG without peaked T waves  - s/p insulin 5, dextrose and 2L IVF bolus    -----------------ENDO:------------------  #Hypothyroidism  - on home dose PO synthroid 50 mcg qd    -----------------HEME/ONC: ---------------  #Fe def anemia  - cont home iron    -----------------EXTREMITIES:-------  Peripheral IV  Benavides placed in ED 6/1 for acute retention    -----------------PROPHYLAXIS: -------    -DVT heparin drip  -GI PPI IV QD  - PT/OT consult    -----------------DISPOSITION--------  Medicine

## 2023-06-06 NOTE — SWALLOW BEDSIDE ASSESSMENT ADULT - PHARYNGEAL PHASE
Delayed pharyngeal swallow/Decreased laryngeal elevation/Multiple swallows Delayed pharyngeal swallow/Decreased laryngeal elevation/Wet vocal quality post oral intake/Cough post oral intake/Throat clear post oral intake/Complaints of pharyngeal stasis/Multiple swallows 3 swallows/Delayed pharyngeal swallow/Decreased laryngeal elevation/Multiple swallows

## 2023-06-06 NOTE — PROGRESS NOTE ADULT - ASSESSMENT
UTI - urine culture negative?  Leukocytosis   Sepsis - resolved    Plan - Change Maxipime to 1gm iv q12hrs x 2 days more.  Time spent - 30 mins

## 2023-06-06 NOTE — CHART NOTE - NSCHARTNOTEFT_GEN_A_CORE
Spoke with daughter Rosey Macdonald [phone] over phone and updated about the patient current condition in details. All questions were answered and gave her direct extension to the unit to call for any concerns.

## 2023-06-06 NOTE — PROGRESS NOTE ADULT - ASSESSMENT
Patient is a 83yo Female with Asthma, Essential HTN, GERD, constipation, Iron def Anemia, Hypothyroidism and recent Rt  hip replacement p/w  AMS and hypoxia. Pt a/w CHIP and hyperkalemia with b/l hydronephrosis s/p benavides placement in ER with bipin pus. Nephrology consulted for Elevated serum creatinine.    1. CHIP- CHIP 2/2 Obstructive uropathy now resolved. CT abd/pelvis with distended bladder and mild b/l hydro. s/p benavides placement with post-obstructive diuresis now improving (UO 1.9L).  Recc to repeat renal US to monitor for resolution. Pt given Kphos Po. Monitor K/Mg/Phos daily; replete prn. Strict I/Os. Avoid nephrotoxins/ NSAIDs/ RCA. Monitor BMP.    2. Obstructive uropathy. See above.     3. Hyperkalemia- resolved.     4. Sepsis 2/2 UTI- +UA, Pt on Cefepime. UCx and BCx [facility] NEPHROLOGY  Chaka Brooks M.D.  Ildefonso Gonzalez D.O.  Dana Kent M.D.  Jayne Stratton, MSN, ANP-C  [phone]  Fax: [phone]    [facility] [place] [address], [address]  [place] [address]

## 2023-06-06 NOTE — SWALLOW BEDSIDE ASSESSMENT ADULT - SWALLOW EVAL: DIAGNOSIS
Pt p/w s&s oropharyngeal dysphagia c/b oral residue, mildly delayed swallow trigger, decreased hyolaryngeal elevation, and multiple swallows. Overt s&s of penetration/aspiration observed on thin liquid trials.

## 2023-06-06 NOTE — CHART NOTE - NSCHARTNOTEFT_GEN_A_CORE
<Hospital Course>  81 yo F w/ Hx Asthma, primary HTN, GERD, constipation, Fe def Anemia, Hypothyroidism and recent hip replacement who was sent in from NH for AMS and hypoxia. Ms. Garcia had a RIGHT hip replacement approx 2 weeks ago at [facility] w/ DC to [facility] for sub acute rehab. Daughter reports that she had began to have diminished PO intake x few days and is not speaking as she usually does. NH started her on Oxygen 6L due to new hypoxia but she does not use O2 at baseline.     In ED: Patient arrived with BUN Cr 173/7.85 and serum K 7.2 w/ EKG showing Sinus Tachycardia, no T wave changes  CT abd showing large distended bladder w/ b/l hydronephrosis. Given 2L IVF, Insulin 5 and Dextrose. Ram placed with urine studies. Urine appeared w/ bipin pus. Given 1x Cefepime dose.     <ICU Course>    Patient's kidney function and potassium improved. CHIP likely due to post-renal obstruction. Acute encephalopathy likely due to uremia. CTH showed no acute findings. She was also treated for AHRF w/ duonebs. Currently tolerating nasal cannula. Venous Doppler of LE was negative for DVT. Urine Culture was negative. She passed her swallow evaluation and currently back to her baseline mental status.    Patient was stable, improved and subsequently transferred to regular medicine [facility] ([facility]) under the service of Dr. Salazar. Covering NP ______ was informed.    <Things to Follow>:  - continue IV Cefepime until 6/8/2023 as per ID (Dr. Currie) reccs  - monitor BMP daily  - follow Nephro (Dr. Kent) reccs <Hospital Course>  81 yo F w/ Hx Asthma, primary HTN, GERD, constipation, Fe def Anemia, Hypothyroidism and recent hip replacement who was sent in from NH for AMS and hypoxia. Ms. Garcia had a RIGHT hip replacement approx 2 weeks ago at [facility] w/ DC to [facility] for sub acute rehab. Daughter reports that she had began to have diminished PO intake x few days and is not speaking as she usually does. NH started her on Oxygen 6L due to new hypoxia but she does not use O2 at baseline.     In ED: Patient arrived with BUN Cr 173/7.85 and serum K 7.2 w/ EKG showing Sinus Tachycardia, no T wave changes  CT abd showing large distended bladder w/ b/l hydronephrosis. Given 2L IVF, Insulin 5 and Dextrose. Ram placed with urine studies. Urine appeared w/ bipin pus. Given 1x Cefepime dose.     <ICU Course>    Patient's kidney function and potassium improved. CHIP likely due to post-renal obstruction. Acute encephalopathy likely due to uremia. CTH showed no acute findings. She was also treated for AHRF w/ duonebs. Currently tolerating nasal cannula. Venous Doppler of LE was negative for DVT. Urine Culture was negative. She passed her swallow evaluation and currently back to her baseline mental status.    Patient was stable, improved and subsequently transferred to regular medicine [facility] ([facility]) under the service of Dr. Salazar. Covering NP Elsi was informed.    <Things to Follow>:  - continue IV Cefepime until 6/8/2023 as per ID (Dr. Currie) reccs  - monitor BMP daily  - continue monitor I&O via Ram  - follow Nephro (Dr. Kent) reccs

## 2023-06-06 NOTE — DIETITIAN NUTRITION RISK NOTIFICATION - TREATMENT: THE FOLLOWING DIET HAS BEEN RECOMMENDED
Diet, Soft and Bite Sized:   DASH/TLC {Sodium & Cholesterol Restricted}  Supplement Feeding Modality:  Oral  Ensure Compact Cans or Servings Per Day:  1       Frequency:  Two Times a day (06-06-23 @ 15:03) [Active]

## 2023-06-06 NOTE — SWALLOW BEDSIDE ASSESSMENT ADULT - SLP PERTINENT HISTORY OF CURRENT PROBLEM
Patient is a 83yo Female with Asthma, Essential HTN, GERD, constipation, Iron def Anemia, Hypothyroidism and recent Rt  hip replacement p/w  AMS and hypoxia. Pt a/w CHIP and hyperkalemia with b/l hydronephrosis s/p benavides placement in ER with bipin pus. CT Head (6/1): Unremarkable head CT. Ischemic white matter disease and atrophy lower range typical for age. Patient motion limited scan.

## 2023-06-06 NOTE — PROGRESS NOTE ADULT - ASSESSMENT
Assessment  81 yo F w/ Hx Asthma, primary HTN, GERD, constipation, REZA, Hypothyroidism and recent hip replacement who was sent in from NH for AMS and hypoxia. Admit for acute renal failure w/ hyperkalemia 2/2 obstructive uropathy and AHRF w in setting of acute metabolic encephalopathy.    PLAN:    -----------------Neuro:------------------  #Acute metabolic encephalopathy  in setting of urine retention, uremia, and cf UTI  - resolved as below  - CT head w/out acute finding, unremarkable     -----------------CVS:------------------  #Primary HTN  - CW Home BP meds  - Metop 12.5mg BID, Amlodipine 5mg qd  - Consider restarting Losartan if remains hypertensive    -----------------RESPIRATORY:--------  #AHRF  > now tolerating 3L  - cont duonebs q6  - no need for heparin drip at this time  - B/L LE US negative for DVT    #Asthma  - On albuterol at home  - cont duonebs    -----------------GI:----------------------  - Pureed diet     #GERD  - cont PPI    #Constipation  - cont bowel regimen when rick PO    -----------------ID:-----------------------  #suspect UTI  UA appears purulent  - UA +, UCx negative x2  - s/p Cefepime x 1  - cont renal dosed Cefepime 1g q 24    -----------------RENAL: ---------------  #Acute obstructive uropathy   - new onset BUN Cr 173/ 7.86 in setting of obstruction  - CT showing obstructive urinary retention and b/l hydronephrosis  - s/p 2L IVF bolus and benavides placement  - Medically necessary benavides for prevention of obstructive nephropathy  - F/u Renal Ultrasound  - Urine output 1.5L ON, approx 90cc/hr  - NEPHRO Dr. Brooks consulted/ Dr. Kent consulted    #Hyperkalemia (Resolved)  - K 7.2 on admission, EKG without peaked T waves  - s/p insulin 5, dextrose and 2L IVF bolus    -----------------ENDO:------------------  #Hypothyroidism  - on home dose PO synthroid 50 mcg qd    -----------------HEME/ONC: ---------------  #Fe def anemia  - cont home iron    -----------------EXTREMITIES:-------  Peripheral IV  Benavides placed in ED 6/1 for acute retention    -----------------PROPHYLAXIS: -------    -DVT heparin drip  -GI PPI IV QD  - PT/OT consult    -----------------DISPOSITION--------  Medicine  Assessment  83 yo F w/ Hx Asthma, primary HTN, GERD, constipation, REZA, Hypothyroidism and recent hip replacement who was sent in from NH for AMS and hypoxia. Admit for acute renal failure w/ hyperkalemia 2/2 obstructive uropathy and AHRF w in setting of acute metabolic encephalopathy.    PLAN:    -----------------Neuro:------------------  #Acute metabolic encephalopathy  in setting of urine retention, uremia, and cf UTI  - resolved as below  - CT head w/out acute finding, unremarkable     -----------------CVS:------------------  #Primary HTN  - CW Home BP meds  - Metop 12.5mg BID, Amlodipine 5mg qd  - Consider restarting Losartan if remains hypertensive    -----------------RESPIRATORY:--------  #AHRF  > now tolerating 3L  - cont duonebs q6h  - B/L LE US negative for DVT    #Asthma  - On albuterol at home  - cont duonebs    -----------------GI:----------------------  - Pureed diet, advance diet?    #GERD  - cont PPI    #Constipation  - cont bowel regimen     -----------------ID:-----------------------  #suspect UTI  UA appears purulent  - UA +, UCx negative x2  - s/p Cefepime x 1  - cont renal dosed Cefepime 1g q 24 for 10 days (6/3-6/10)    -----------------RENAL: ---------------  #Acute obstructive uropathy   - new onset BUN Cr 173/ 7.86 in setting of obstruction  - CT showing obstructive urinary retention and b/l hydronephrosis  - Medically necessary benavides for prevention of obstructive nephropathy  - F/u Renal Ultrasound  - Urine output 1.5L ON, approx 90cc/hr  - NEPHRO Dr. Brooks consulted/ Dr. Kent consulted    #Hyperkalemia (Resolved)  - K 7.2 on admission, EKG without peaked T waves  - s/p insulin 5, dextrose and 2L IVF bolus    -----------------ENDO:------------------  #Hypothyroidism  - on home dose PO synthroid 50 mcg qd    -----------------HEME/ONC: ---------------  #Fe def anemia  - cont home iron    -----------------EXTREMITIES:-------  Peripheral IV  Benavides placed in ED 6/1 for acute retention    -----------------PROPHYLAXIS: -------    -DVT heparin drip  -GI PPI IV QD  - PT/OT consult    -----------------DISPOSITION--------  Medicine

## 2023-06-06 NOTE — SWALLOW BEDSIDE ASSESSMENT ADULT - CONSISTENCIES ADMINISTERED
x3 tspn applesauce/pureed x1 bite cookie/regular solid x2 tspn/minced & moist x2 by straw, x3 by cup/thin liquid soft & bite-sized

## 2023-06-06 NOTE — PROGRESS NOTE ADULT - SUBJECTIVE AND OBJECTIVE BOX
82y Female    Meds:  cefepime   IVPB 1000 milliGRAM(s) IV Intermittent every 12 hours    Allergies    No Known Allergies    Intolerances        VITALS:  Vital Signs Last 24 Hrs  T(C): 37 (06 Jun 2023 16:06), Max: 37 (06 Jun 2023 16:06)  T(F): 98.6 (06 Jun 2023 16:06), Max: 98.6 (06 Jun 2023 16:06)  HR: 86 (06 Jun 2023 16:00) (66 - 93)  BP: 107/49 (06 Jun 2023 16:00) (107/49 - 144/58)  BP(mean): 63 (06 Jun 2023 16:00) (61 - 80)  RR: 20 (06 Jun 2023 16:00) (16 - 21)  SpO2: 99% (06 Jun 2023 14:00) (93% - 100%)    Parameters below as of 06 Jun 2023 06:00  Patient On (Oxygen Delivery Method): nasal cannula  O2 Flow (L/min): 2      LABS/DIAGNOSTIC TESTS:                          12.6   16.13 )-----------( 190      ( 06 Jun 2023 04:27 )             39.4         06-06    137  |  102  |  8   ----------------------------<  94  4.2   |  33<H>  |  0.27<L>    Ca    8.3<L>      06 Jun 2023 04:27  Phos  2.2     06-06  Mg     1.8     06-06    TPro  5.5<L>  /  Alb  1.9<L>  /  TBili  0.4  /  DBili  x   /  AST  42<H>  /  ALT  24  /  AlkPhos  116  06-05      LIVER FUNCTIONS - ( 05 Jun 2023 04:07 )  Alb: 1.9 g/dL / Pro: 5.5 g/dL / ALK PHOS: 116 U/L / ALT: 24 U/L DA / AST: 42 U/L / GGT: x             CULTURES: Catheterized Catheterized  06-03 @ 13:55   No growth  --  --      Clean Catch Clean Catch (Midstream)  06-01 @ 20:55   No growth  --  --      .Blood Blood-Peripheral  06-01 @ 18:50   No growth to date.  --  --      .Blood Blood-Peripheral  06-01 @ 18:45   No growth to date.  --  --            RADIOLOGY:< from: US Duplex Venous Lower Ext Ltd, Left (06.02.23 @ 10:56) >  ACC: [id number] EXAM:  US DPLX LWR EXT VEINS LTD LT   ORDERED BY: BENNY KANG     PROCEDURE DATE:  06/02/2023          INTERPRETATION:  CLINICAL INFORMATION: Recent hip replacement. Change in   mental status.    COMPARISON: None available.    TECHNIQUE: Duplex sonography of the LEFT LOWER extremity veins with color   and spectral Doppler, with and without compression.    FINDINGS:    There is normal compressibility of the left common femoral, femoral and   popliteal veins.  Unable to evaluate the contralateral common femoral vein due to overlying   brace.  Doppler examination shows normal spontaneous and phasic flow.    No calf vein thrombosis is detected, although there is nonvisualization   of the left soleal and gastrocnemius veins.    IMPRESSION:  No evidence of left lower extremity deep venous thrombosis.            --- End of Report ---          ERIC BLEVINS MD; Resident Radiologist  This document has been electronically signed.  DAQUAN NIETO MD; Attending Radiologist  This document has been electronically signed. Jun 2 2023 11:36AM    < end of copied text >        ROS:  [  ] UNABLE TO ELICIT ICU VISIT  82y Female who remains in the ICU, she is awaiting downgrade to the floor, she is doing much better and feeling much better, she denies any abdominal pain , no nausea , vomiting or diarrhea, she has right leg pain when she was stood up today by PT. She has no fevers or chills, she still has a high WBC count. all her cultures came back negative.     Meds:  cefepime   IVPB 1000 milliGRAM(s) IV Intermittent every 12 hours    Allergies    No Known Allergies    Intolerances        VITALS:  Vital Signs Last 24 Hrs  T(C): 37 (06 Jun 2023 16:06), Max: 37 (06 Jun 2023 16:06)  T(F): 98.6 (06 Jun 2023 16:06), Max: 98.6 (06 Jun 2023 16:06)  HR: 86 (06 Jun 2023 16:00) (66 - 93)  BP: 107/49 (06 Jun 2023 16:00) (107/49 - 144/58)  BP(mean): 63 (06 Jun 2023 16:00) (61 - 80)  RR: 20 (06 Jun 2023 16:00) (16 - 21)  SpO2: 99% (06 Jun 2023 14:00) (93% - 100%)    Parameters below as of 06 Jun 2023 06:00  Patient On (Oxygen Delivery Method): nasal cannula  O2 Flow (L/min): 2      LABS/DIAGNOSTIC TESTS:                          12.6   16.13 )-----------( 190      ( 06 Jun 2023 04:27 )             39.4         06-06    137  |  102  |  8   ----------------------------<  94  4.2   |  33<H>  |  0.27<L>    Ca    8.3<L>      06 Jun 2023 04:27  Phos  2.2     06-06  Mg     1.8     06-06    TPro  5.5<L>  /  Alb  1.9<L>  /  TBili  0.4  /  DBili  x   /  AST  42<H>  /  ALT  24  /  AlkPhos  116  06-05      LIVER FUNCTIONS - ( 05 Jun 2023 04:07 )  Alb: 1.9 g/dL / Pro: 5.5 g/dL / ALK PHOS: 116 U/L / ALT: 24 U/L DA / AST: 42 U/L / GGT: x             CULTURES: Catheterized Catheterized  06-03 @ 13:55   No growth  --  --      Clean Catch Clean Catch (Midstream)  06-01 @ 20:55   No growth  --  --      .Blood Blood-Peripheral  06-01 @ 18:50   No growth to date.  --  --      .Blood Blood-Peripheral  06-01 @ 18:45   No growth to date.  --  --            RADIOLOGY:< from: US Duplex Venous Lower Ext Ltd, Left (06.02.23 @ 10:56) >  ACC: [id number] EXAM:  US DPLX LWR EXT VEINS LTD LT   ORDERED BY: BENNY KANG     PROCEDURE DATE:  06/02/2023          INTERPRETATION:  CLINICAL INFORMATION: Recent hip replacement. Change in   mental status.    COMPARISON: None available.    TECHNIQUE: Duplex sonography of the LEFT LOWER extremity veins with color   and spectral Doppler, with and without compression.    FINDINGS:    There is normal compressibility of the left common femoral, femoral and   popliteal veins.  Unable to evaluate the contralateral common femoral vein due to overlying   brace.  Doppler examination shows normal spontaneous and phasic flow.    No calf vein thrombosis is detected, although there is nonvisualization   of the left soleal and gastrocnemius veins.    IMPRESSION:  No evidence of left lower extremity deep venous thrombosis.            --- End of Report ---          ERIC BLEVINS MD; Resident Radiologist  This document has been electronically signed.  DAQUAN NIETO MD; Attending Radiologist  This document has been electronically signed. Jun 2 2023 11:36AM    < end of copied text >        ROS:  [  ] UNABLE TO ELICIT

## 2023-06-06 NOTE — PROGRESS NOTE ADULT - SUBJECTIVE AND OBJECTIVE BOX
PATIENT SEEN AND EXAMINED ON :- 6/6/23  DATE OF SERVICE:  6/6/23           Interim events noted,Labs ,Radiological studies and Cardiology tests reviewed .    MR#[id number]  PATIENT NAME:-LEWIS RODRIGUEZ       [facility] COURSE: HPI:  81 yo F w/ Hx Asthma, primary HTN, GERD, constipation, Fe def Anemia, Hypothyroidism and recent hip replacement who was sent in from NH for AMS and hypoxia. Ms. Rodriguez had a RIGHT hip replacement approx 2 weeks ago at [facility] w/ DC to [facility] for sub acute rehab. Daughter reports that she had began to have diminished PO intake x few days and is not speaking as she usually does. NH started her on Oxygen 6L due to new hypoxia but she does not use O2 at baseline.     In ED: Patient arrived with BUN Cr 173/7.85 and serum K 7.2 w/ EKG showing no peaked T waves. Vitals notable for hypoxia, on 5L NC maintaining 94%.  CT abd showing large distended bladder w/ b/l hydronephrosis. Given 2L IVF, Insulin 5 and Dextrose. Ram placed with urine studies. Urine appeared w/ bipin pus. Given 1x Cefepime dose. 1x Duoneb and ABG for hypoxia. (01 Jun 2023 20:12)      INTERIM EVENTS:Patient seen at bedside ,interim events noted.      PMH -reviewed admission note, no change since admission  HEART FAILURE: Acute[ ]Chronic[ ] Systolic[ ] Diastolic[ ] Combined Systolic and Diastolic[ ]  CAD[ ] CABG[ ] PCI[ ]  DEVICES[ ] PPM[ ] ICD[ ] ILR[ ]  ATRIAL FIBRILLATION[ ] Paroxysmal[ ] Permanent[ ] CHADS2-[  ]  CHIP[ ] CKD1[ ] CKD2[ ] CKD3[ ] CKD4[ ] ESRD[ ]  COPD[ ] HTN[ ]   DM[ ] Type1[ ] Type 2[ ]   CVA[ ] Paresis[ ]    AMBULATION: Assisted[ ] Cane/walker[ ] Independent[ ]    MEDICATIONS  (STANDING):  amLODIPine   Tablet 5 milliGRAM(s) Oral daily  atorvastatin 10 milliGRAM(s) Oral at bedtime  budesonide  80 MICROgram(s)/formoterol 4.5 MICROgram(s) Inhaler 2 Puff(s) Inhalation two times a day  cefepime   IVPB 1000 milliGRAM(s) IV Intermittent every 12 hours  ferrous    sulfate 325 milliGRAM(s) Oral daily  heparin   Injectable 5000 Unit(s) SubCutaneous every 8 hours  levothyroxine 50 MICROGram(s) Oral daily  metoprolol tartrate 12.5 milliGRAM(s) Oral two times a day  pantoprazole   Suspension 40 milliGRAM(s) Oral daily  tiotropium 2.5 MICROgram(s) Inhaler 2 Puff(s) Inhalation daily    MEDICATIONS  (PRN):  albuterol    90 MICROgram(s) HFA Inhaler 2 Puff(s) Inhalation every 6 hours PRN Shortness of Breath and/or Wheezing            REVIEW OF SYSTEMS:  Constitutional: [ ] fever, [ ]weight loss,  [ ]fatigue [ ]weight gain  Eyes: [ ] visual changes  Respiratory: [ ]shortness of breath;  [ ] cough, [ ]wheezing, [ ]chills, [ ]hemoptysis  Cardiovascular: [ ] chest pain, [ ]palpitations, [ ]dizziness,  [ ]leg swelling[ ]orthopnea[ ]PND  Gastrointestinal: [ ] abdominal pain, [ ]nausea, [ ]vomiting,  [ ]diarrhea [ ]Constipation [ ]Melena  Genitourinary: [ ] dysuria, [ ] hematuria [ ]Ram  Neurologic: [ ] headaches [ ] tremors[ ]weakness [ ]Paralysis Right[ ] Left[ ]  Skin: [ ] itching, [ ]burning, [ ] rashes  Endocrine: [ ] heat or cold intolerance  Musculoskeletal: [ ] joint pain or swelling; [ ] muscle, back, or extremity pain  Psychiatric: [ ] depression, [ ]anxiety, [ ]mood swings, or [ ]difficulty sleeping  Hematologic: [ ] easy bruising, [ ] bleeding gums    [ ] All remaining systems negative except as per above.   [ ]Unable to obtain.  [x] No change in ROS since admission      Vital Signs Last 24 Hrs  T(C): 37 (06 Jun 2023 20:47), Max: 37 (06 Jun 2023 16:06)  T(F): 98.6 (06 Jun 2023 20:47), Max: 98.6 (06 Jun 2023 16:06)  HR: 87 (06 Jun 2023 20:47) (66 - 100)  BP: 123/52 (06 Jun 2023 20:47) (107/49 - 144/58)  BP(mean): 65 (06 Jun 2023 20:00) (61 - 80)  RR: 20 (06 Jun 2023 20:47) (17 - 23)  SpO2: 91% (06 Jun 2023 20:47) (91% - 100%)    Parameters below as of 06 Jun 2023 20:47  Patient On (Oxygen Delivery Method): nasal cannula  O2 Flow (L/min): 2    I&O's Summary    05 Jun 2023 07:01  -  06 Jun 2023 07:00  --------------------------------------------------------  IN: 630 mL / OUT: 1975 mL / NET: -1345 mL    06 Jun 2023 07:01  -  06 Jun 2023 21:36  --------------------------------------------------------  IN: 879 mL / OUT: 575 mL / NET: 304 mL        PHYSICAL EXAM:  General: No acute distress BMI-  HEENT: EOMI, PERRL  Neck: Supple, [ ] JVD  Lungs: Equal air entry bilaterally; [ ] rales [ ] wheezing [ ] rhonchi  Heart: Regular rate and rhythm; [x ] murmur   2/6 [ x] systolic [ ] diastolic [ ] radiation[ ] rubs [ ]  gallops  Abdomen: Nontender, bowel sounds present  Extremities: No clubbing, cyanosis, [ ] edema [ ]Pulses  equal and intact  Nervous system:  Alert & Oriented X3, no focal deficits  Psychiatric: Normal affect  Skin: No rashes or lesions    LABS:  06-06    137  |  102  |  8   ----------------------------<  94  4.2   |  33<H>  |  0.27<L>    Ca    8.3<L>      06 Jun 2023 04:27  Phos  2.2     06-06  Mg     1.8     06-06    TPro  5.5<L>  /  Alb  1.9<L>  /  TBili  0.4  /  DBili  x   /  AST  42<H>  /  ALT  24  /  AlkPhos  116  06-05    Creatinine Trend: 0.27<--, 0.20<--, 0.26<--, 0.31<--, 0.26<--, 1.15<--                        12.6   16.13 )-----------( 190      ( 06 Jun 2023 04:27 )             39.4

## 2023-06-06 NOTE — SWALLOW BEDSIDE ASSESSMENT ADULT - ORAL PREPARATORY PHASE
Mildly decreased mastication ability Within functional limits/Decreased mastication ability Within functional limits Prolonged mastication/Decreased mastication ability

## 2023-06-06 NOTE — PROGRESS NOTE ADULT - SUBJECTIVE AND OBJECTIVE BOX
INTERVAL HPI/OVERNIGHT EVENTS:       PRESSORS: [ ] YES [ ] NO  WHICH:    ANTIBIOTICS:                  DATE STARTED:  ANTIBIOTICS:                  DATE STARTED:    Antimicrobial:  cefepime   IVPB 1000 milliGRAM(s) IV Intermittent every 12 hours    Cardiovascular:  amLODIPine   Tablet 5 milliGRAM(s) Oral daily  metoprolol tartrate 12.5 milliGRAM(s) Oral two times a day    Pulmonary:  albuterol    90 MICROgram(s) HFA Inhaler 2 Puff(s) Inhalation every 6 hours PRN  budesonide  80 MICROgram(s)/formoterol 4.5 MICROgram(s) Inhaler 2 Puff(s) Inhalation two times a day  guaiFENesin Oral Liquid (Sugar-Free) 100 milliGRAM(s) Oral every 6 hours PRN  tiotropium 2.5 MICROgram(s) Inhaler 2 Puff(s) Inhalation daily    Hematalogic:  heparin   Injectable 5000 Unit(s) SubCutaneous every 8 hours    Other:  atorvastatin 10 milliGRAM(s) Oral at bedtime  chlorhexidine 2% Cloths 1 Application(s) Topical <User Schedule>  levothyroxine 50 MICROGram(s) Oral daily  pantoprazole   Suspension 40 milliGRAM(s) Oral daily    albuterol    90 MICROgram(s) HFA Inhaler 2 Puff(s) Inhalation every 6 hours PRN  amLODIPine   Tablet 5 milliGRAM(s) Oral daily  atorvastatin 10 milliGRAM(s) Oral at bedtime  budesonide  80 MICROgram(s)/formoterol 4.5 MICROgram(s) Inhaler 2 Puff(s) Inhalation two times a day  cefepime   IVPB 1000 milliGRAM(s) IV Intermittent every 12 hours  chlorhexidine 2% Cloths 1 Application(s) Topical <User Schedule>  guaiFENesin Oral Liquid (Sugar-Free) 100 milliGRAM(s) Oral every 6 hours PRN  heparin   Injectable 5000 Unit(s) SubCutaneous every 8 hours  levothyroxine 50 MICROGram(s) Oral daily  metoprolol tartrate 12.5 milliGRAM(s) Oral two times a day  pantoprazole   Suspension 40 milliGRAM(s) Oral daily  tiotropium 2.5 MICROgram(s) Inhaler 2 Puff(s) Inhalation daily    Drug Dosing Weight    Weight (kg): 63.458 (01 Jun 2023 17:52)    PHYSICAL EXAM:  GENERAL: NAD  EYES: EOMI, PERRLA  NECK: Supple, No JVD; Normal thyroid; Trachea midline: No LAD   NERVOUS SYSTEM:  Alert & Oriented X3,  Motor Strength 5/5 B/L upper and lower extremities; DTRs 2+ intact and symmetric  CHEST/LUNG: No rales, rhonchi, wheezing, breath sounds present bilaterally  HEART: Regular rate and rhythm; No murmurs, no gallops  ABDOMEN: Soft, Nontender, Nondistended; Bowel sounds present, no pain or masses on palpation  : voiding well, Alvarez in place  EXTREMITIES:  2+ Peripheral Pulses, No clubbing, cyanosis, or edema  SKIN: warm, intact, no lesions     LINES/DRAINS/DEVICES  CENTRAL LINE: [ ] YES [ ] NO  LOCATION:     ALVAREZ: [ ] YES [ ] NO     A-LINE:  [ ] YES [ ] NO  LOCATION:       ICU Vital Signs Last 24 Hrs  T(C): 36.4 (06 Jun 2023 04:00), Max: 37.2 (05 Jun 2023 12:00)  T(F): 97.5 (06 Jun 2023 04:00), Max: 98.9 (05 Jun 2023 12:00)  HR: 81 (06 Jun 2023 06:00) (66 - 96)  BP: 120/54 (06 Jun 2023 06:00) (108/46 - 145/52)  BP(mean): 70 (06 Jun 2023 06:00) (61 - 85)  ABP: --  ABP(mean): --  RR: 17 (06 Jun 2023 06:00) (16 - 22)  SpO2: 98% (06 Jun 2023 06:00) (91% - 100%)    O2 Parameters below as of 06 Jun 2023 06:00  Patient On (Oxygen Delivery Method): nasal cannula  O2 Flow (L/min): 2                06-05 @ 07:01  -  06-06 @ 07:00  --------------------------------------------------------  IN: 630 mL / OUT: 1975 mL / NET: -1345 mL              LABS:  CBC Full  -  ( 06 Jun 2023 04:27 )  WBC Count : 16.13 K/uL  RBC Count : 3.80 M/uL  Hemoglobin : 12.6 g/dL  Hematocrit : 39.4 %  Platelet Count - Automated : 190 K/uL  Mean Cell Volume : 103.7 fl  Mean Cell Hemoglobin : 33.2 pg  Mean Cell Hemoglobin Concentration : 32.0 gm/dL  Auto Neutrophil # : x  Auto Lymphocyte # : x  Auto Monocyte # : x  Auto Eosinophil # : x  Auto Basophil # : x  Auto Neutrophil % : x  Auto Lymphocyte % : x  Auto Monocyte % : x  Auto Eosinophil % : x  Auto Basophil % : x    06-06    137  |  102  |  8   ----------------------------<  94  4.2   |  33<H>  |  0.27<L>    Ca    8.3<L>      06 Jun 2023 04:27  Phos  2.2     06-06  Mg     1.8     06-06    TPro  5.5<L>  /  Alb  1.9<L>  /  TBili  0.4  /  DBili  x   /  AST  42<H>  /  ALT  24  /  AlkPhos  116  06-05        Culture Results:   No growth (06-03 @ 13:55)      RADIOLOGY & ADDITIONAL STUDIES REVIEWED DURING TEAM ROUNDS    [ ]GOALS OF CARE DISCUSSION WITH PATIENT/FAMILY/PROXY:    CRITICAL CARE TIME SPENT: 35 minutes   INTERVAL HPI/OVERNIGHT EVENTS:   Patient seen and examined at bedside. No acute events. Pt was upright in bed, eating breakfast and denied any pain or discomfort.      PRESSORS: [ ] YES [X ] NO  WHICH:    ANTIBIOTICS: Cefepime 1g             DATE STARTED: 6/3/23                     Antimicrobial:  cefepime   IVPB 1000 milliGRAM(s) IV Intermittent every 12 hours    Cardiovascular:  amLODIPine   Tablet 5 milliGRAM(s) Oral daily  metoprolol tartrate 12.5 milliGRAM(s) Oral two times a day    Pulmonary:  albuterol    90 MICROgram(s) HFA Inhaler 2 Puff(s) Inhalation every 6 hours PRN  budesonide  80 MICROgram(s)/formoterol 4.5 MICROgram(s) Inhaler 2 Puff(s) Inhalation two times a day  guaiFENesin Oral Liquid (Sugar-Free) 100 milliGRAM(s) Oral every 6 hours PRN  tiotropium 2.5 MICROgram(s) Inhaler 2 Puff(s) Inhalation daily    Hematalogic:  heparin   Injectable 5000 Unit(s) SubCutaneous every 8 hours    Other:  atorvastatin 10 milliGRAM(s) Oral at bedtime  chlorhexidine 2% Cloths 1 Application(s) Topical <User Schedule>  levothyroxine 50 MICROGram(s) Oral daily  pantoprazole   Suspension 40 milliGRAM(s) Oral daily    albuterol    90 MICROgram(s) HFA Inhaler 2 Puff(s) Inhalation every 6 hours PRN  amLODIPine   Tablet 5 milliGRAM(s) Oral daily  atorvastatin 10 milliGRAM(s) Oral at bedtime  budesonide  80 MICROgram(s)/formoterol 4.5 MICROgram(s) Inhaler 2 Puff(s) Inhalation two times a day  cefepime   IVPB 1000 milliGRAM(s) IV Intermittent every 12 hours  chlorhexidine 2% Cloths 1 Application(s) Topical <User Schedule>  guaiFENesin Oral Liquid (Sugar-Free) 100 milliGRAM(s) Oral every 6 hours PRN  heparin   Injectable 5000 Unit(s) SubCutaneous every 8 hours  levothyroxine 50 MICROGram(s) Oral daily  metoprolol tartrate 12.5 milliGRAM(s) Oral two times a day  pantoprazole   Suspension 40 milliGRAM(s) Oral daily  tiotropium 2.5 MICROgram(s) Inhaler 2 Puff(s) Inhalation daily    Drug Dosing Weight    Weight (kg): 63.458 (01 Jun 2023 17:52)    PHYSICAL EXAM:  GENERAL: NAD  EYES: EOMI, PERRLA  NECK: Supple, No JVD; Normal thyroid; Trachea midline: No LAD   NERVOUS SYSTEM:  Alert & Oriented X3,  Motor Strength 5/5 B/L upper and lower extremities; DTRs 2+ intact and symmetric  CHEST/LUNG: No rales, rhonchi, wheezing, breath sounds present bilaterally  HEART: Regular rate and rhythm; No murmurs, no gallops  ABDOMEN: Soft, Nontender, Nondistended; Bowel sounds present, no pain or masses on palpation  : voiding well, Alvarez in place  EXTREMITIES:  2+ Peripheral Pulses, No clubbing, cyanosis, or edema  SKIN: warm, intact, no lesions     LINES/DRAINS/DEVICES  CENTRAL LINE: [ ] YES [X ] NO  LOCATION:     ALVAREZ: [X ] YES [ ] NO    A-LINE:  [ ] YES [X] NO  LOCATION:       ICU Vital Signs Last 24 Hrs  T(C): 36.4 (06 Jun 2023 04:00), Max: 37.2 (05 Jun 2023 12:00)  T(F): 97.5 (06 Jun 2023 04:00), Max: 98.9 (05 Jun 2023 12:00)  HR: 81 (06 Jun 2023 06:00) (66 - 96)  BP: 120/54 (06 Jun 2023 06:00) (108/46 - 145/52)  BP(mean): 70 (06 Jun 2023 06:00) (61 - 85)  ABP: --  ABP(mean): --  RR: 17 (06 Jun 2023 06:00) (16 - 22)  SpO2: 98% (06 Jun 2023 06:00) (91% - 100%)    O2 Parameters below as of 06 Jun 2023 06:00  Patient On (Oxygen Delivery Method): nasal cannula  O2 Flow (L/min): 2      06-05 @ 07:01  -  06-06 @ 07:00  --------------------------------------------------------  IN: 630 mL / OUT: 1975 mL / NET: -1345 mL    LABS:  CBC Full  -  ( 06 Jun 2023 04:27 )  WBC Count : 16.13 K/uL  RBC Count : 3.80 M/uL  Hemoglobin : 12.6 g/dL  Hematocrit : 39.4 %  Platelet Count - Automated : 190 K/uL  Mean Cell Volume : 103.7 fl  Mean Cell Hemoglobin : 33.2 pg  Mean Cell Hemoglobin Concentration : 32.0 gm/dL  Auto Neutrophil # : x  Auto Lymphocyte # : x  Auto Monocyte # : x  Auto Eosinophil # : x  Auto Basophil # : x  Auto Neutrophil % : x  Auto Lymphocyte % : x  Auto Monocyte % : x  Auto Eosinophil % : x  Auto Basophil % : x    06-06    137  |  102  |  8   ----------------------------<  94  4.2   |  33<H>  |  0.27<L>    Ca    8.3<L>      06 Jun 2023 04:27  Phos  2.2     06-06  Mg     1.8     06-06    TPro  5.5<L>  /  Alb  1.9<L>  /  TBili  0.4  /  DBili  x   /  AST  42<H>  /  ALT  24  /  AlkPhos  116  06-05      Culture Results:   No growth (06-03 @ 13:55)    RADIOLOGY & ADDITIONAL STUDIES REVIEWED DURING TEAM ROUNDS

## 2023-06-07 ENCOUNTER — TRANSCRIPTION ENCOUNTER (OUTPATIENT)
Age: 83
End: 2023-06-07

## 2023-06-07 LAB
ALBUMIN SERPL ELPH-MCNC: 2.1 G/DL — LOW (ref 3.5–5)
ALP SERPL-CCNC: 111 U/L — SIGNIFICANT CHANGE UP (ref 40–120)
ALT FLD-CCNC: 29 U/L DA — SIGNIFICANT CHANGE UP (ref 10–60)
ANION GAP SERPL CALC-SCNC: 2 MMOL/L — LOW (ref 5–17)
ANISOCYTOSIS BLD QL: SLIGHT — SIGNIFICANT CHANGE UP
AST SERPL-CCNC: 35 U/L — SIGNIFICANT CHANGE UP (ref 10–40)
BASOPHILS # BLD AUTO: 0 K/UL — SIGNIFICANT CHANGE UP (ref 0–0.2)
BASOPHILS NFR BLD AUTO: 0 % — SIGNIFICANT CHANGE UP (ref 0–2)
BILIRUB SERPL-MCNC: 0.3 MG/DL — SIGNIFICANT CHANGE UP (ref 0.2–1.2)
BUN SERPL-MCNC: 7 MG/DL — SIGNIFICANT CHANGE UP (ref 7–18)
CALCIUM SERPL-MCNC: 8.5 MG/DL — SIGNIFICANT CHANGE UP (ref 8.4–10.5)
CHLORIDE SERPL-SCNC: 107 MMOL/L — SIGNIFICANT CHANGE UP (ref 96–108)
CO2 SERPL-SCNC: 34 MMOL/L — HIGH (ref 22–31)
CREAT SERPL-MCNC: 0.28 MG/DL — LOW (ref 0.5–1.3)
CULTURE RESULTS: SIGNIFICANT CHANGE UP
CULTURE RESULTS: SIGNIFICANT CHANGE UP
EGFR: 108 ML/MIN/1.73M2 — SIGNIFICANT CHANGE UP
ELLIPTOCYTES BLD QL SMEAR: SLIGHT — SIGNIFICANT CHANGE UP
EOSINOPHIL # BLD AUTO: 0.78 K/UL — HIGH (ref 0–0.5)
EOSINOPHIL NFR BLD AUTO: 4 % — SIGNIFICANT CHANGE UP (ref 0–6)
GLUCOSE BLDC GLUCOMTR-MCNC: 116 MG/DL — HIGH (ref 70–99)
GLUCOSE BLDC GLUCOMTR-MCNC: 87 MG/DL — SIGNIFICANT CHANGE UP (ref 70–99)
GLUCOSE SERPL-MCNC: 100 MG/DL — HIGH (ref 70–99)
HCT VFR BLD CALC: 32.7 % — LOW (ref 34.5–45)
HGB BLD-MCNC: 10.3 G/DL — LOW (ref 11.5–15.5)
LYMPHOCYTES # BLD AUTO: 11 % — LOW (ref 13–44)
LYMPHOCYTES # BLD AUTO: 2.15 K/UL — SIGNIFICANT CHANGE UP (ref 1–3.3)
MAGNESIUM SERPL-MCNC: 1.7 MG/DL — SIGNIFICANT CHANGE UP (ref 1.6–2.6)
MANUAL SMEAR VERIFICATION: SIGNIFICANT CHANGE UP
MCHC RBC-ENTMCNC: 31.5 GM/DL — LOW (ref 32–36)
MCHC RBC-ENTMCNC: 32.4 PG — SIGNIFICANT CHANGE UP (ref 27–34)
MCV RBC AUTO: 102.8 FL — HIGH (ref 80–100)
MONOCYTES # BLD AUTO: 1.56 K/UL — HIGH (ref 0–0.9)
MONOCYTES NFR BLD AUTO: 8 % — SIGNIFICANT CHANGE UP (ref 2–14)
NEUTROPHILS # BLD AUTO: 14.45 K/UL — HIGH (ref 1.8–7.4)
NEUTROPHILS NFR BLD AUTO: 73 % — SIGNIFICANT CHANGE UP (ref 43–77)
NEUTS BAND # BLD: 1 % — SIGNIFICANT CHANGE UP (ref 0–8)
NRBC # BLD: 0 /100 — SIGNIFICANT CHANGE UP (ref 0–0)
OVALOCYTES BLD QL SMEAR: SLIGHT — SIGNIFICANT CHANGE UP
PHOSPHATE SERPL-MCNC: 2.2 MG/DL — LOW (ref 2.5–4.5)
PLAT MORPH BLD: NORMAL — SIGNIFICANT CHANGE UP
PLATELET # BLD AUTO: 161 K/UL — SIGNIFICANT CHANGE UP (ref 150–400)
PLATELET COUNT - ESTIMATE: NORMAL — SIGNIFICANT CHANGE UP
POLYCHROMASIA BLD QL SMEAR: SLIGHT — SIGNIFICANT CHANGE UP
POTASSIUM SERPL-MCNC: 4.2 MMOL/L — SIGNIFICANT CHANGE UP (ref 3.5–5.3)
POTASSIUM SERPL-SCNC: 4.2 MMOL/L — SIGNIFICANT CHANGE UP (ref 3.5–5.3)
PROT SERPL-MCNC: 5.9 G/DL — LOW (ref 6–8.3)
RBC # BLD: 3.18 M/UL — LOW (ref 3.8–5.2)
RBC # FLD: 12.2 % — SIGNIFICANT CHANGE UP (ref 10.3–14.5)
RBC BLD AUTO: ABNORMAL
SODIUM SERPL-SCNC: 143 MMOL/L — SIGNIFICANT CHANGE UP (ref 135–145)
SPECIMEN SOURCE: SIGNIFICANT CHANGE UP
SPECIMEN SOURCE: SIGNIFICANT CHANGE UP
SPHEROCYTES BLD QL SMEAR: SLIGHT — SIGNIFICANT CHANGE UP
VARIANT LYMPHS # BLD: 3 % — SIGNIFICANT CHANGE UP (ref 0–6)
WBC # BLD: 19.53 K/UL — HIGH (ref 3.8–10.5)
WBC # FLD AUTO: 19.53 K/UL — HIGH (ref 3.8–10.5)

## 2023-06-07 PROCEDURE — 76775 US EXAM ABDO BACK WALL LIM: CPT | Mod: 26

## 2023-06-07 RX ORDER — POTASSIUM PHOSPHATE, MONOBASIC POTASSIUM PHOSPHATE, DIBASIC 236; 224 MG/ML; MG/ML
15 INJECTION, SOLUTION INTRAVENOUS ONCE
Refills: 0 | Status: COMPLETED | OUTPATIENT
Start: 2023-06-07 | End: 2023-06-07

## 2023-06-07 RX ADMIN — POTASSIUM PHOSPHATE, MONOBASIC POTASSIUM PHOSPHATE, DIBASIC 62.5 MILLIMOLE(S): 236; 224 INJECTION, SOLUTION INTRAVENOUS at 10:08

## 2023-06-07 RX ADMIN — CEFEPIME 100 MILLIGRAM(S): 1 INJECTION, POWDER, FOR SOLUTION INTRAMUSCULAR; INTRAVENOUS at 17:50

## 2023-06-07 RX ADMIN — Medication 50 MICROGRAM(S): at 06:17

## 2023-06-07 RX ADMIN — Medication 12.5 MILLIGRAM(S): at 06:17

## 2023-06-07 RX ADMIN — HEPARIN SODIUM 5000 UNIT(S): 5000 INJECTION INTRAVENOUS; SUBCUTANEOUS at 06:18

## 2023-06-07 RX ADMIN — HEPARIN SODIUM 5000 UNIT(S): 5000 INJECTION INTRAVENOUS; SUBCUTANEOUS at 13:33

## 2023-06-07 RX ADMIN — BUDESONIDE AND FORMOTEROL FUMARATE DIHYDRATE 2 PUFF(S): 160; 4.5 AEROSOL RESPIRATORY (INHALATION) at 21:38

## 2023-06-07 RX ADMIN — Medication 325 MILLIGRAM(S): at 17:50

## 2023-06-07 RX ADMIN — CEFEPIME 100 MILLIGRAM(S): 1 INJECTION, POWDER, FOR SOLUTION INTRAMUSCULAR; INTRAVENOUS at 06:17

## 2023-06-07 RX ADMIN — ALBUTEROL 2 PUFF(S): 90 AEROSOL, METERED ORAL at 17:51

## 2023-06-07 RX ADMIN — PANTOPRAZOLE SODIUM 40 MILLIGRAM(S): 20 TABLET, DELAYED RELEASE ORAL at 13:30

## 2023-06-07 RX ADMIN — TIOTROPIUM BROMIDE 2 PUFF(S): 18 CAPSULE ORAL; RESPIRATORY (INHALATION) at 13:30

## 2023-06-07 RX ADMIN — ATORVASTATIN CALCIUM 10 MILLIGRAM(S): 80 TABLET, FILM COATED ORAL at 21:36

## 2023-06-07 RX ADMIN — BUDESONIDE AND FORMOTEROL FUMARATE DIHYDRATE 2 PUFF(S): 160; 4.5 AEROSOL RESPIRATORY (INHALATION) at 10:09

## 2023-06-07 RX ADMIN — HEPARIN SODIUM 5000 UNIT(S): 5000 INJECTION INTRAVENOUS; SUBCUTANEOUS at 21:36

## 2023-06-07 RX ADMIN — AMLODIPINE BESYLATE 5 MILLIGRAM(S): 2.5 TABLET ORAL at 06:17

## 2023-06-07 RX ADMIN — Medication 12.5 MILLIGRAM(S): at 17:51

## 2023-06-07 NOTE — PROGRESS NOTE ADULT - SUBJECTIVE AND OBJECTIVE BOX
Patient is a 82y old  Female who presents with a chief complaint of Hyperkalemia, Acute/Chronic renal failure (06 Jun 2023 17:12)      INTERVAL HPI/OVERNIGHT EVENTS: no acute events overnight     REVIEW OF SYSTEMS:  CONSTITUTIONAL: No fever, chills  ENMT:  No difficulty hearing, no change in vision  NECK: No pain or stiffness  RESPIRATORY: No cough, SOB  CARDIOVASCULAR: No chest pain, palpitations  GASTROINTESTINAL: No abdominal pain. No nausea, vomiting, or diarrhea  GENITOURINARY: No dysuria  NEUROLOGICAL: No HA  SKIN: No itching, burning, rashes, or lesions   LYMPH NODES: No enlarged glands  ENDOCRINE: No heat or cold intolerance; No hair loss  MUSCULOSKELETAL: No joint pain or swelling; No muscle, back, or extremity pain  PSYCHIATRIC: No depression, anxiety  HEME/LYMPH: No easy bruising, or bleeding gums    T(C): 37.1 (06-07-23 @ 13:35), Max: 37.1 (06-07-23 @ 05:10)  HR: 96 (06-07-23 @ 15:48) (82 - 96)  BP: 128/59 (06-07-23 @ 15:48) (107/52 - 129/57)  RR: 20 (06-07-23 @ 15:48) (16 - 21)  SpO2: 93% (06-07-23 @ 15:48) (91% - 98%)  Wt(kg): --Vital Signs Last 24 Hrs  T(C): 37.1 (07 Jun 2023 13:35), Max: 37.1 (07 Jun 2023 05:10)  T(F): 98.7 (07 Jun 2023 13:35), Max: 98.8 (07 Jun 2023 05:10)  HR: 96 (07 Jun 2023 15:48) (82 - 96)  BP: 128/59 (07 Jun 2023 15:48) (107/52 - 129/57)  BP(mean): 77 (07 Jun 2023 15:48) (63 - 77)  RR: 20 (07 Jun 2023 15:48) (16 - 21)  SpO2: 93% (07 Jun 2023 15:48) (91% - 98%)    Parameters below as of 07 Jun 2023 15:48  Patient On (Oxygen Delivery Method): nasal cannula  O2 Flow (L/min): 2      PHYSICAL EXAM:  GENERAL: NAD  EYES: clear conjunctiva; EOMI  ENMT: Moist mucous membranes  NECK: Supple, No JVD, Normal thyroid  CHEST/LUNG: Clear to auscultation bilaterally; No rales, rhonchi, wheezing, or rubs  HEART: S1, S2, Regular rate and rhythm  ABDOMEN: Soft, Nontender, Nondistended; Bowel sounds present  NEURO: Alert & Oriented X3  EXTREMITIES: No LE edema, no calf tenderness  LYMPH: No lymphadenopathy noted  SKIN: No rashes or lesions  : benavides with clear yellow urine     Consultant(s) Notes Reviewed:  [x ] YES  [ ] NO  Care Discussed with Consultants/Other Providers [ x] YES  [ ] NO    LABS:                        10.3   19.53 )-----------( 161      ( 07 Jun 2023 06:27 )             32.7     06-07    143  |  107  |  7   ----------------------------<  100<H>  4.2   |  34<H>  |  0.28<L>    Ca    8.5      07 Jun 2023 06:27  Phos  2.2     06-07  Mg     1.7     06-07    TPro  5.9<L>  /  Alb  2.1<L>  /  TBili  0.3  /  DBili  x   /  AST  35  /  ALT  29  /  AlkPhos  111  06-07      CAPILLARY BLOOD GLUCOSE      POCT Blood Glucose.: 116 mg/dL (07 Jun 2023 11:49)  POCT Blood Glucose.: 87 mg/dL (07 Jun 2023 07:54)  POCT Blood Glucose.: 135 mg/dL (06 Jun 2023 21:07)    RADIOLOGY & ADDITIONAL TESTS:    Imaging Personally Reviewed:  [ x] YES  [ ] NO  < from: US Renal (06.07.23 @ 10:32) >    ACC: [id number] EXAM:  US KIDNEY(S)   ORDERED BY: EVIN ROWLAND     PROCEDURE DATE:  06/07/2023          INTERPRETATION:  CLINICAL INFORMATION: Postacute kidney injury.    COMPARISON: None available.    TECHNIQUE: Sonography of the kidneys and bladder.    FINDINGS:  Right kidney: 11.0 cm. No renal mass, hydronephrosis or calculi.    Left kidney: 11.4 cm. No renal mass, hydronephrosis or calculi.    Urinary bladder: Collapsed around a Benavides catheter.    IMPRESSION:  Normal renal ultrasound.    ---End of Report ---            AYLEEN SIDDIQUI MD; Attending Radiologist    < end of copied text >  < from: US Duplex Venous Lower Ext Ltd, Left (06.02.23 @ 10:56) >    ACC: [id number] EXAM:  US DPLX LWR EXT VEINS LTD LT   ORDERED BY: BENNY KANG     PROCEDURE DATE:  06/02/2023          INTERPRETATION:  CLINICAL INFORMATION: Recent hip replacement. Change in   mental status.    COMPARISON: None available.    TECHNIQUE: Duplex sonography of the LEFT LOWER extremity veins with color   and spectral Doppler, with and without compression.    FINDINGS:    There is normal compressibility of the left common femoral, femoral and   popliteal veins.  Unable to evaluate the contralateral common femoral vein due to overlying   brace.  Doppler examination shows normal spontaneous and phasic flow.    No calf vein thrombosis is detected, although there is nonvisualization   of the left soleal and gastrocnemius veins.    IMPRESSION:  No evidence of left lower extremity deep venous thrombosis.            --- End of Report ---          ERIC BLEVINS MD; Resident Radiologist  This document has been electronically signed.  DAQUAN NIETO MD; Attending Radiologist  This document has been electronically signed. Jun 2 2023 11:36AM    < end of copied text >

## 2023-06-07 NOTE — DISCHARGE NOTE PROVIDER - HOSPITAL COURSE
83 yo F w/ Hx Asthma, primary HTN, GERD, constipation, Fe def Anemia, Hypothyroidism and recent hip replacement who was sent in from NH for AMS and hypoxia.  In ED: Patient arrived with BUN Cr 173/7.85 and serum K 7.2 w/ EKG showing Sinus Tachycardia, no T wave changes  CT abd showing large distended bladder w/ b/l hydronephrosis. In the Ed pt given 2L IVF, Insulin 5 and Dextrose. Ram placed with urine studies. Urine appeared w/ bipin pus. Given 1x Cefepime dose.   Pt. admitted for acute renal failure w/ hyperkalemia 2/2 urinary obstruction and AHRF w/ c/f PE in setting of recent joint replacement.     <ICU Course>  Patient's kidney function and potassium improved. CHIP likely due to post-renal obstruction. Acute encephalopathy likely due to uremia. CTH showed no acute findings. She was also treated for AHRF w/ duonebs. Currently tolerating nasal cannula. Venous Doppler of LE was negative for DVT. Urine Culture was negative. She passed her swallow evaluation and currently back to her baseline mental status.    Patient was stable, improved and subsequently transferred to regular medicine.      Incomplete----6/7/23   83 yo F w/ Hx Asthma, primary HTN, GERD, constipation, Fe def Anemia, Hypothyroidism and recent hip replacement who was sent in from NH for AMS and hypoxia.  In ED: Patient arrived with BUN Cr 173/7.85 and serum K 7.2 w/ EKG showing Sinus Tachycardia, no T wave changes  CT abd showing large distended bladder w/ b/l hydronephrosis. In the Ed pt given 2L IVF, Insulin 5 and Dextrose. Ram placed with urine studies. Urine appeared w/ bipin pus. Given 1x Cefepime dose.   Pt. admitted for acute renal failure w/ hyperkalemia 2/2 urinary obstruction and AHRF w/ c/f PE in setting of recent joint replacement.     <ICU Course>  Patient's kidney function and potassium improved. CHIP likely due to post-renal obstruction. Acute encephalopathy likely due to uremia. CTH showed no acute findings. She was also treated for AHRF w/ duonebs. Currently tolerating nasal cannula. Venous Doppler of LE was negative for DVT. Urine Culture was negative. She passed her swallow evaluation and currently back to her baseline mental status.    Patient was stable, improved and subsequently transferred to regular medicine.    Pt completed abx pt failed Trial of void angie due to decreased to mobility. Pt to f/u with urology OP for trial of void.

## 2023-06-07 NOTE — DISCHARGE NOTE PROVIDER - NSDCCPCAREPLAN_GEN_ALL_CORE_FT
PRINCIPAL DISCHARGE DIAGNOSIS  Diagnosis: Acute metabolic encephalopathy  Assessment and Plan of Treatment: You presented to the hospital with altered mental status. You had a ct scan of your head which was negative for any acute changes.  The cause of your encephalopathy was likely due to your renal failure. you were treated in the ICU; your mental status has improved. Encephalopathy is a term used to describe brain disease or brain damage. It usually develops because of a health condition such as cirrhosis, or a brain injury. Symptoms may be mild or severe, and may be short-term or permanent.   DISCHARGE INSTRUCTIONS:  Call 911 or have someone else call for any of the following:  You cannot be woken.  Please follow up with your primary care proivder on discharge  You had a seizure.      SECONDARY DISCHARGE DIAGNOSES  Diagnosis: Acute renal failure  Assessment and Plan of Treatment: You presented to the hospital with elevated serum creatinine. you were admitted to the ICU for acute renal failure., renal failure happens when your kidneys suddenly stop working correctly. you were treated in the ICU. your lab work was monitored closely and your kidney functions have improved. you were followed by a nephrologist.   Please continue hydration and follow up with your PCP.    Diagnosis: Acute UTI  Assessment and Plan of Treatment: you were treated with IV antibiotics for a urinary tract infection. you were seen by an infectious disease doctor.   If you were prescribed antibiotics, take them exactly as your caregiver instructs you. Finish the medication even if you feel better after you have only taken some of the medication.  Drink enough water and fluids to keep your urine clear or pale yellow.  Avoid caffeine, tea, and carbonated beverages. They tend to irritate your bladder.  Empty your bladder often. Avoid holding urine for long periods of time.  After a bowel movement, women should cleanse from front to back. Use each tissue only once.  SEEK MEDICAL CARE IF:  You have back pain.  You develop a fever.  Your symptoms do not begin to resolve within 3 days.  SEEK IMMEDIATE MEDICAL CARE IF:  You have severe back pain or lower abdominal pain.  You develop chills.  You have nausea or vomiting.  You have continued burning or discomfort with urination.  Follow with your doctor.      Diagnosis: Acute respiratory failure with hypoxia  Assessment and Plan of Treatment: You were admitted to the hospital for hypoxia. you had a ct scan of your chest which showed: Mucous impacted airways in the right lower lobe. you were provided with supplemental oxygen, and nebulizer treatment and your condition improved. you were followed in the ICU.  Acute respiratory failure (ARF) is a condition that happens when your lungs cannot get enough oxygen into your blood.  Call your local emergency number (911 in the [place]) or have someone call if:  You have more trouble catching your breath.  You have stopped breathing.  Follow up with your doctor as directed      Diagnosis: Obstructive uropathy  Assessment and Plan of Treatment: Obstructive uropathy is a condition in which the flow of urine is blocked. This causes the urine to back up and injure one or both kidneys.CT abd/pelvis with distended bladder and mild b/l hydro. You had a benavides placed in the ED to help drain your urine. you were followed by a nephrologist.    Diagnosis: HTN (hypertension)  Assessment and Plan of Treatment: You have a history of high blood pressure. High blood pressure is a condition that puts you at risk for heart attack, stroke and kidney disease. Please continue to take your medications as prescribed. You can also help control your blood pressure by maintaining a healthy weight, eating a diet low in fat and rich in fruits and vegetables, reduce the amount of salt in your diet. Also, reduce alcohol and try to include some form of physical activity daily for at least 30 mins. Follow up with your medical doctor to establish long term blood pressure treatment goals.  Notify your doctor if you have any of the following symptoms:   Dizziness, Lightheadedness, Blurry vision, Headache, Chest pain, Shortness of breath      Diagnosis: Asthma  Assessment and Plan of Treatment: You have a history of asthma  Take medicines as directed by your caregiver.  Control your home environment to help prevent asthma attacks:  Do not smoke. Do not stay in places where others are smoking.  Wash hands frequently.  Talk to your caregiver about an action plan for managing asthma attacks.   SEEK MEDICAL CARE IF:  You have wheezing, shortness of breath, or a cough even if taking medicine to prevent attacks.   You have thickening of sputum.   Your sputum changes from clear or white to yellow, green, gray, or bloody.   You are using a reliever medicine more than 2–3 times per week.  Follow with your primary care doctor on discharge.        PRINCIPAL DISCHARGE DIAGNOSIS  Diagnosis: Acute metabolic encephalopathy  Assessment and Plan of Treatment: You presented to the hospital with altered mental status. You had a ct scan of your head which was negative for any acute changes.  The cause of your encephalopathy was likely due to your renal failure. you were treated in the ICU; your mental status has improved. Encephalopathy is a term used to describe brain disease or brain damage. It usually develops because of a health condition such as cirrhosis, or a brain injury. Symptoms may be mild or severe, and may be short-term or permanent.   DISCHARGE INSTRUCTIONS:  Call 911 or have someone else call for any of the following:  You cannot be woken.  Please follow up with your primary care proivder on discharge  You had a seizure.      SECONDARY DISCHARGE DIAGNOSES  Diagnosis: Acute UTI  Assessment and Plan of Treatment: you were treated with IV antibiotics for a urinary tract infection. you were seen by an infectious disease doctor.   If you were prescribed antibiotics, take them exactly as your caregiver instructs you. Finish the medication even if you feel better after you have only taken some of the medication.  Drink enough water and fluids to keep your urine clear or pale yellow.  Avoid caffeine, tea, and carbonated beverages. They tend to irritate your bladder.  Empty your bladder often. Avoid holding urine for long periods of time.  After a bowel movement, women should cleanse from front to back. Use each tissue only once.  SEEK MEDICAL CARE IF:  You have back pain.  You develop a fever.  Your symptoms do not begin to resolve within 3 days.  SEEK IMMEDIATE MEDICAL CARE IF:  You have severe back pain or lower abdominal pain.  You develop chills.  You have nausea or vomiting.  You have continued burning or discomfort with urination.  Follow with your doctor.      Diagnosis: Acute respiratory failure with hypoxia  Assessment and Plan of Treatment: You were admitted to the hospital for hypoxia. you had a ct scan of your chest which showed: Mucous impacted airways in the right lower lobe. you were provided with supplemental oxygen, and nebulizer treatment and your condition improved. you were followed in the ICU.  Acute respiratory failure (ARF) is a condition that happens when your lungs cannot get enough oxygen into your blood.  Call your local emergency number (911 in the [place]) or have someone call if:  You have more trouble catching your breath.  You have stopped breathing.  Follow up with your doctor as directed      Diagnosis: Acute renal failure  Assessment and Plan of Treatment: You presented to the hospital with elevated serum creatinine. you were admitted to the ICU for acute renal failure., renal failure happens when your kidneys suddenly stop working correctly. you were treated in the ICU. your lab work was monitored closely and your kidney functions have improved. you were followed by a nephrologist.   Please continue hydration and follow up with your PCP.    Diagnosis: Obstructive uropathy  Assessment and Plan of Treatment: Obstructive uropathy is a condition in which the flow of urine is blocked. This causes the urine to back up and injure one or both kidneys.CT abd/pelvis with distended bladder and mild b/l hydro. You had a benavides placed in the ED to help drain your urine. you were followed by a nephrologist.    Diagnosis: HTN (hypertension)  Assessment and Plan of Treatment: You have a history of high blood pressure. High blood pressure is a condition that puts you at risk for heart attack, stroke and kidney disease. Please continue to take your medications as prescribed. You can also help control your blood pressure by maintaining a healthy weight, eating a diet low in fat and rich in fruits and vegetables, reduce the amount of salt in your diet. Also, reduce alcohol and try to include some form of physical activity daily for at least 30 mins. Follow up with your medical doctor to establish long term blood pressure treatment goals.  Notify your doctor if you have any of the following symptoms:   Dizziness, Lightheadedness, Blurry vision, Headache, Chest pain, Shortness of breath      Diagnosis: Asthma  Assessment and Plan of Treatment: You have a history of asthma  Take medicines as directed by your caregiver.  Control your home environment to help prevent asthma attacks:  Do not smoke. Do not stay in places where others are smoking.  Wash hands frequently.  Talk to your caregiver about an action plan for managing asthma attacks.   SEEK MEDICAL CARE IF:  You have wheezing, shortness of breath, or a cough even if taking medicine to prevent attacks.   You have thickening of sputum.   Your sputum changes from clear or white to yellow, green, gray, or bloody.   You are using a reliever medicine more than 2–3 times per week.  Follow with your primary care doctor on discharge.       Diagnosis: Benavides catheter present  Assessment and Plan of Treatment: You failed  Trial of void, PLease follow up with urolgy as outpatient.

## 2023-06-07 NOTE — CONSULT NOTE ADULT - SUBJECTIVE AND OBJECTIVE BOX
Time of visit:    CHIEF COMPLAINT: Patient is a 82y old  Female who presents with a chief complaint of Hyperkalemia, Acute/Chronic renal failure (07 Jun 2023 18:07)      HPI:  81 yo F w/ Hx Asthma, primary HTN, GERD, constipation, Fe def Anemia, Hypothyroidism and recent hip replacement who was sent in from NH for AMS and hypoxia. Ms. Garcia had a RIGHT hip replacement approx 2 weeks ago at [facility] w/ DC to [facility] for sub acute rehab. Daughter reports that she had began to have diminished PO intake x few days and is not speaking as she usually does. NH started her on Oxygen 6L due to new hypoxia but she does not use O2 at baseline.     In ED: Patient arrived with BUN Cr 173/7.85 and serum K 7.2 w/ EKG showing no peaked T waves. Vitals notable for hypoxia, on 5L NC maintaining 94%.  CT abd showing large distended bladder w/ b/l hydronephrosis. Given 2L IVF, Insulin 5 and Dextrose. Ram placed with urine studies. Urine appeared w/ bipin pus. Given 1x Cefepime dose. 1x Duoneb and ABG for hypoxia. (01 Jun 2023 20:12)   Patient seen and examined.     PAST MEDICAL & SURGICAL HISTORY:  Asthma with acute exacerbation, unspecified asthma severity      Essential hypertension      Hypothyroid      GERD (gastroesophageal reflux disease)      Constipation      Fe deficiency anemia      History of arthroplasty of right hip          Allergies    No Known Allergies    Intolerances        MEDICATIONS  (STANDING):  amLODIPine   Tablet 5 milliGRAM(s) Oral daily  atorvastatin 10 milliGRAM(s) Oral at bedtime  budesonide  80 MICROgram(s)/formoterol 4.5 MICROgram(s) Inhaler 2 Puff(s) Inhalation two times a day  cefepime   IVPB 1000 milliGRAM(s) IV Intermittent every 12 hours  ferrous    sulfate 325 milliGRAM(s) Oral daily  heparin   Injectable 5000 Unit(s) SubCutaneous every 8 hours  levothyroxine 50 MICROGram(s) Oral daily  metoprolol tartrate 12.5 milliGRAM(s) Oral two times a day  pantoprazole   Suspension 40 milliGRAM(s) Oral daily  tiotropium 2.5 MICROgram(s) Inhaler 2 Puff(s) Inhalation daily      MEDICATIONS  (PRN):  albuterol    90 MICROgram(s) HFA Inhaler 2 Puff(s) Inhalation every 6 hours PRN Shortness of Breath and/or Wheezing   Medications up to date at time of exam.    Medications up to date at time of exam.    FAMILY HISTORY:  No pertinent family history in first degree relatives        SOCIAL HISTORY  Smoking History: [   ] smoking/smoke exposure, [   ] former smoker  Living Condition: [   ] apartment, [   ] private house  Work History:   Travel History: denies recent travel  Illicit Substance Use: denies  Alcohol Use: denies    REVIEW OF SYSTEMS:    CONSTITUTIONAL:  denies fevers, chills, sweats, weight loss    HEENT:  denies diplopia or blurred vision, sore throat or runny nose.    CARDIOVASCULAR:  denies pressure, squeezing, tightness, or heaviness about the chest; no palpitations.    RESPIRATORY:  denies SOB, cough, JEAN BAPTISTE, wheezing.    GASTROINTESTINAL:  denies abdominal pain, nausea, vomiting or diarrhea.    GENITOURINARY: denies dysuria, frequency or urgency.    NEUROLOGIC:  denies numbness, tingling, seizures or weakness.    PSYCHIATRIC:  denies disorder of thought or mood.    MSK: denies swelling, redness      PHYSICAL EXAMINATION:    GENERAL: The patient is a well-developed, well-nourished, in no apparent distress.     Vital Signs Last 24 Hrs  T(C): 37.1 (07 Jun 2023 13:35), Max: 37.1 (07 Jun 2023 05:10)  T(F): 98.7 (07 Jun 2023 13:35), Max: 98.8 (07 Jun 2023 05:10)  HR: 96 (07 Jun 2023 15:48) (87 - 96)  BP: 128/59 (07 Jun 2023 15:48) (114/56 - 129/57)  BP(mean): 77 (07 Jun 2023 15:48) (68 - 77)  RR: 20 (07 Jun 2023 15:48) (16 - 20)  SpO2: 93% (07 Jun 2023 15:48) (91% - 95%)    Parameters below as of 07 Jun 2023 15:48  Patient On (Oxygen Delivery Method): nasal cannula  O2 Flow (L/min): 2     (if applicable)    Chest Tube (if applicable)    HEENT: Head is normocephalic and atraumatic. Extraocular muscles are intact. Mucous membranes are moist.     NECK: Supple, no palpable adenopathy.    LUNGS: Clear to auscultation, no wheezing, rales, or rhonchi.    HEART: Regular rate and rhythm without murmur.    ABDOMEN: Soft, nontender, and nondistended.  No hepatosplenomegaly is noted.    RENAL: No difficulty voiding, no pelvic pain    EXTREMITIES: Without any cyanosis, clubbing, rash, lesions or edema.    NEUROLOGIC: Awake, alert, oriented, grossly intact    SKIN: Warm, dry, good turgor.      LABS:                        10.3   19.53 )-----------( 161      ( 07 Jun 2023 06:27 )             32.7     06-07    143  |  107  |  7   ----------------------------<  100<H>  4.2   |  34<H>  |  0.28<L>    Ca    8.5      07 Jun 2023 06:27  Phos  2.2     06-07  Mg     1.7     06-07    TPro  5.9<L>  /  Alb  2.1<L>  /  TBili  0.3  /  DBili  x   /  AST  35  /  ALT  29  /  AlkPhos  111  06-07                        MICROBIOLOGY: (if applicable)    RADIOLOGY & ADDITIONAL STUDIES:  EKG:   CXR:  ECHO:    IMPRESSION: 82y Female PAST MEDICAL & SURGICAL HISTORY:  Asthma with acute exacerbation, unspecified asthma severity      Essential hypertension      Hypothyroid      GERD (gastroesophageal reflux disease)      Constipation      Fe deficiency anemia      History of arthroplasty of right hip       p/w                   RECOMMENDATIONS:   Time of visit:    CHIEF COMPLAINT: Patient is a 82y old  Female who presents with a chief complaint of Hyperkalemia, Acute/Chronic renal failure (07 Jun 2023 18:07)      HPI:  81 yo F w/ Hx Asthma, primary HTN, GERD, constipation, Fe def Anemia, Hypothyroidism and recent hip replacement who was sent in from NH for AMS and hypoxia. Ms. Garcia had a RIGHT hip replacement approx 2 weeks ago at [facility] w/ DC to [facility] for sub acute rehab. Daughter reports that she had began to have diminished PO intake x few days and is not speaking as she usually does. NH started her on Oxygen 6L due to new hypoxia but she does not use O2 at baseline.     In ED: Patient arrived with BUN Cr 173/7.85 and serum K 7.2 w/ EKG showing no peaked T waves. Vitals notable for hypoxia, on 5L NC maintaining 94%.  CT abd showing large distended bladder w/ b/l hydronephrosis. Given 2L IVF, Insulin 5 and Dextrose. Ram placed with urine studies. Urine appeared w/ bipin pus. Given 1x Cefepime dose. 1x Duoneb and ABG for hypoxia. (01 Jun 2023 20:12)   Patient seen and examined.     PAST MEDICAL & SURGICAL HISTORY:  Asthma with acute exacerbation, unspecified asthma severity      Essential hypertension      Hypothyroid      GERD (gastroesophageal reflux disease)      Constipation      Fe deficiency anemia      History of arthroplasty of right hip          Allergies    No Known Allergies    Intolerances        MEDICATIONS  (STANDING):  amLODIPine   Tablet 5 milliGRAM(s) Oral daily  atorvastatin 10 milliGRAM(s) Oral at bedtime  budesonide  80 MICROgram(s)/formoterol 4.5 MICROgram(s) Inhaler 2 Puff(s) Inhalation two times a day  cefepime   IVPB 1000 milliGRAM(s) IV Intermittent every 12 hours  ferrous    sulfate 325 milliGRAM(s) Oral daily  heparin   Injectable 5000 Unit(s) SubCutaneous every 8 hours  levothyroxine 50 MICROGram(s) Oral daily  metoprolol tartrate 12.5 milliGRAM(s) Oral two times a day  pantoprazole   Suspension 40 milliGRAM(s) Oral daily  tiotropium 2.5 MICROgram(s) Inhaler 2 Puff(s) Inhalation daily      MEDICATIONS  (PRN):  albuterol    90 MICROgram(s) HFA Inhaler 2 Puff(s) Inhalation every 6 hours PRN Shortness of Breath and/or Wheezing   Medications up to date at time of exam.    Medications up to date at time of exam.    FAMILY HISTORY:  No pertinent family history in first degree relatives        SOCIAL HISTORY  Smoking History: [   ] smoking/smoke exposure, [   ] former smoker  Living Condition: [   ] apartment, [   ] private house  Work History:   Travel History: denies recent travel  Illicit Substance Use: denies  Alcohol Use: denies    REVIEW OF SYSTEMS:    CONSTITUTIONAL:  denies fevers, chills, sweats, weight loss    HEENT:  denies diplopia or blurred vision, sore throat or runny nose.    CARDIOVASCULAR:  denies pressure, squeezing, tightness, or heaviness about the chest; no palpitations.    RESPIRATORY:  denies SOB, cough, JEAN BAPTISTE, wheezing.    GASTROINTESTINAL:  denies abdominal pain, nausea, vomiting or diarrhea.    GENITOURINARY: denies dysuria, frequency or urgency.    NEUROLOGIC:  denies numbness, tingling, seizures or weakness.    PSYCHIATRIC:  denies disorder of thought or mood.    MSK: denies swelling, redness      PHYSICAL EXAMINATION:    GENERAL: The patient is a well-developed, well-nourished, in no apparent distress.     Vital Signs Last 24 Hrs  T(C): 37.1 (07 Jun 2023 13:35), Max: 37.1 (07 Jun 2023 05:10)  T(F): 98.7 (07 Jun 2023 13:35), Max: 98.8 (07 Jun 2023 05:10)  HR: 96 (07 Jun 2023 15:48) (87 - 96)  BP: 128/59 (07 Jun 2023 15:48) (114/56 - 129/57)  BP(mean): 77 (07 Jun 2023 15:48) (68 - 77)  RR: 20 (07 Jun 2023 15:48) (16 - 20)  SpO2: 93% (07 Jun 2023 15:48) (91% - 95%)    Parameters below as of 07 Jun 2023 15:48  Patient On (Oxygen Delivery Method): nasal cannula  O2 Flow (L/min): 2     (if applicable)    Chest Tube (if applicable)    HEENT: Head is normocephalic and atraumatic. Extraocular muscles are intact. Mucous membranes are moist.     NECK: Supple, no palpable adenopathy.    LUNGS: Clear to auscultation, no wheezing, rales, or rhonchi.    HEART: Regular rate and rhythm without murmur.    ABDOMEN: Soft, nontender, and nondistended.  No hepatosplenomegaly is noted.    RENAL: No difficulty voiding, no pelvic pain    EXTREMITIES: Without any cyanosis, clubbing, rash, lesions or edema.    NEUROLOGIC: Awake, alert, oriented, grossly intact    SKIN: Warm, dry, good turgor.      LABS:                        10.3   19.53 )-----------( 161      ( 07 Jun 2023 06:27 )             32.7     06-07    143  |  107  |  7   ----------------------------<  100<H>  4.2   |  34<H>  |  0.28<L>    Ca    8.5      07 Jun 2023 06:27  Phos  2.2     06-07  Mg     1.7     06-07    TPro  5.9<L>  /  Alb  2.1<L>  /  TBili  0.3  /  DBili  x   /  AST  35  /  ALT  29  /  AlkPhos  111  06-07                        MICROBIOLOGY: (if applicable)    RADIOLOGY & ADDITIONAL STUDIES:  EKG:   CXR:  ECHO:    IMPRESSION: 82y Female PAST MEDICAL & SURGICAL HISTORY:  Asthma with acute exacerbation, unspecified asthma severity      Essential hypertension      Hypothyroid      GERD (gastroesophageal reflux disease)      Constipation      Fe deficiency anemia      History of arthroplasty of right hip       p/w               IMP: This is an 82 yr old woman with  Asthma, primary HTN, GERD, constipation, Fe def Anemia, Hypothyroidism and recent hip replacement who was sent in from NH for AMS and hypoxia. Ms. Garcia had a RIGHT hip replacement approx 2 weeks ago at [facility] w/ DC to [facility] for sub acute rehab. Daughter reports that she had began to have diminished PO intake x few days and is not speaking as she usually does. NH started her on Oxygen 6L due to new hypoxia but she does not use O2 at baseline.     In ED: Patient arrived with BUN Cr 173/7.85 and serum K 7.2 w/ EKG showing Sinus Tachycardia, no T wave changes  CT abd showing large distended bladder w/ b/l hydronephrosis. Given 2L IVF, Insulin 5 and Dextrose. Ram placed with urine studies. Urine appeared w/ bipin pus. Given 1x Cefepime dose.     <ICU Course>    Patient's kidney function and potassium improved. CHIP likely due to post-renal obstruction. Acute encephalopathy likely due to uremia. CTH showed no acute findings. She was also treated for AHRF w/ duonebs. Currently tolerating nasal cannula. Venous Doppler of LE was negative for DVT. Urine Culture was negative. She passed her swallow evaluation and currently back to her baseline mental status.      Sugg    - Taper off o2 as tolerated   - Continue Symbicort   - Antibx   - DVT GI prophy   - OOB to chair

## 2023-06-07 NOTE — DISCHARGE NOTE PROVIDER - PROVIDER TOKENS
FREE:[LAST:[damian],FIRST:[Amin],PHONE:[(   )    -],FAX:[(   )    -],FOLLOWUP:[2 weeks]],PROVIDER:[TOKEN:[[id number]],FOLLOWUP:[2 weeks]]

## 2023-06-07 NOTE — DISCHARGE NOTE PROVIDER - PROVIDER RX CONTACT NUMBER
[phone] You can access the FollowMyHealth Patient Portal offered by [facility] by registering at the following website: [email or web address]. By joining [employer]’s FollowMyHealth portal, you will also be able to view your health information using other applications (apps) compatible with our system.

## 2023-06-07 NOTE — DISCHARGE NOTE PROVIDER - CARE PROVIDER_API CALL
Martin salgado  Phone: (   )    -  Fax: (   )    -  Follow Up Time: 2 weeks    Edilberto Traylor  Internal Medicine  [address], [address]  [place] [address]  Phone: [phone]  Fax: [phone]  Follow Up Time: 2 weeks

## 2023-06-07 NOTE — DISCHARGE NOTE PROVIDER - NSDCMRMEDTOKEN_GEN_ALL_CORE_FT
albuterol 2.5 mg/3 mL (0.083%) inhalation solution: 3 by nebulizer every 4 hours as needed for  shortness of breath and/or wheezing  amLODIPine 5 mg oral tablet: 1 orally once a day  aspirin 81 mg oral tablet: 1 orally once a day  calcium (as carbonate) 500 mg oral tablet: 1 tab(s) orally once a day  Colace 100 mg oral capsule: 2 orally once a day (at bedtime)  esomeprazole 40 mg oral delayed release capsule: 1 orally once a day  ferrous sulfate 325 mg (65 mg elemental iron) oral delayed release tablet: 1 orally 2 times a day  levothyroxine 50 mcg (0.05 mg) oral tablet: 1 orally once a day  losartan 50 mg oral tablet: 1 orally once a day  metoprolol succinate 25 mg oral tablet, extended release: 1 orally once a day  Senna 8.6 mg oral tablet: 1 orally once a day  traMADol 50 mg oral tablet: 1 orally 3 times a day   acetaminophen 325 mg oral tablet: 2 tab(s) orally every 6 hours As needed Mild Pain (1 - 3)  albuterol 2.5 mg/3 mL (0.083%) inhalation solution: 3 by nebulizer every 4 hours as needed for  shortness of breath and/or wheezing  amLODIPine 5 mg oral tablet: 1 orally once a day  aspirin 81 mg oral tablet: 1 orally once a day  atorvastatin 10 mg oral tablet: 1 tab(s) orally once a day (at bedtime)  budesonide-formoterol 80 mcg-4.5 mcg/inh inhalation aerosol: 2 puff(s) inhaled every 8 hours as needed for  bronchospasm  calcium (as carbonate) 500 mg oral tablet: 1 tab(s) orally once a day  Colace 100 mg oral capsule: 2 orally once a day (at bedtime)  esomeprazole 40 mg oral delayed release capsule: 1 orally once a day  ferrous sulfate 325 mg (65 mg elemental iron) oral delayed release tablet: 1 orally 2 times a day  levothyroxine 50 mcg (0.05 mg) oral tablet: 1 orally once a day  losartan 50 mg oral tablet: 1 orally once a day  metoprolol succinate 25 mg oral tablet, extended release: 1 orally once a day  Senna 8.6 mg oral tablet: 1 orally once a day  tiotropium 2.5 mcg/inh inhalation aerosol: 2 puff(s) inhaled once a day  traMADol 50 mg oral tablet: 1 orally 3 times a day

## 2023-06-07 NOTE — PROGRESS NOTE ADULT - ASSESSMENT
83 yo F w/ Hx Asthma, primary HTN, GERD, constipation, Fe def Anemia, Hypothyroidism and recent hip replacement who was sent in from NH for AMS and hypoxia. Ms. Garcia had a RIGHT hip replacement approx 2 weeks ago at [facility] w/ DC to [facility] for sub acute rehab.p/w decreased PO intake.  NH started her on Oxygen 6L due to new hypoxia.  In ED: Patient arrived with BUN Cr 173/7.85 and serum K 7.2 w/ EKG showing Sinus Tachycardia, no T wave changes  CT abd showing large distended bladder w/ b/l hydronephrosis. Given 2L IVF, Insulin 5 and Dextrose. Benavides placed with urine studies. Urine appeared w/ bipin pus. Given 1x Cefepime dose.   IN ICU Patient's kidney function and potassium improved. CHIP likely due to post-renal obstruction. Acute encephalopathy likely due to uremia. CTH showed no acute findings. She was also treated for AHRF w/ duonebs. Currently tolerating nasal cannula. Venous Doppler of LE was negative for DVT. Urine Culture was negative. She passed her swallow evaluation and currently back to her baseline mental status.    Downgrade to Medicine 6/6    #Acute metabolic encephalopathy  in setting of urine retention, uremia, and UTI  - resolved as below  - CT head w/out acute finding, unremarkable   - pt is at her baseline now     #Acute obstructive uropathy   - CT showing obstructive urinary retention and b/l hydronephrosis  - continue benavides   - Dr. Kent     #suspect UTI  - s/p abx tomorrow 6/8    #Primary HTN  - controlled   - Metop 12.5mg BID, Amlodipine 5mg qd  - Consider restarting Losartan if remains hypertensive    #AHRF  - saturating well on 3L NC  - cont Duoneb q6h  - B/L LE US negative for DVT  - resolved now     #Asthma  - On albuterol at home  - cont Duoneb likely will switch ot MDI in am     #GERD  - cont PPI    #Hypothyroidism  - on home dose PO synthroid 50 mcg qd    # DVT PPX  - Pantoprazole   - hep Sq    # Pt last dose abx tomorrow   - d/c planning in am to [facility]

## 2023-06-08 DIAGNOSIS — K21.9 GASTRO-ESOPHAGEAL REFLUX DISEASE WITHOUT ESOPHAGITIS: ICD-10-CM

## 2023-06-08 DIAGNOSIS — I10 ESSENTIAL (PRIMARY) HYPERTENSION: ICD-10-CM

## 2023-06-08 DIAGNOSIS — Z29.9 ENCOUNTER FOR PROPHYLACTIC MEASURES, UNSPECIFIED: ICD-10-CM

## 2023-06-08 DIAGNOSIS — J96.01 ACUTE RESPIRATORY FAILURE WITH HYPOXIA: ICD-10-CM

## 2023-06-08 DIAGNOSIS — J45.909 UNSPECIFIED ASTHMA, UNCOMPLICATED: ICD-10-CM

## 2023-06-08 DIAGNOSIS — N39.0 URINARY TRACT INFECTION, SITE NOT SPECIFIED: ICD-10-CM

## 2023-06-08 DIAGNOSIS — G93.41 METABOLIC ENCEPHALOPATHY: ICD-10-CM

## 2023-06-08 DIAGNOSIS — N13.9 OBSTRUCTIVE AND REFLUX UROPATHY, UNSPECIFIED: ICD-10-CM

## 2023-06-08 DIAGNOSIS — Z02.9 ENCOUNTER FOR ADMINISTRATIVE EXAMINATIONS, UNSPECIFIED: ICD-10-CM

## 2023-06-08 DIAGNOSIS — E87.1 HYPO-OSMOLALITY AND HYPONATREMIA: ICD-10-CM

## 2023-06-08 RX ADMIN — HEPARIN SODIUM 5000 UNIT(S): 5000 INJECTION INTRAVENOUS; SUBCUTANEOUS at 06:00

## 2023-06-08 RX ADMIN — AMLODIPINE BESYLATE 5 MILLIGRAM(S): 2.5 TABLET ORAL at 06:00

## 2023-06-08 RX ADMIN — HEPARIN SODIUM 5000 UNIT(S): 5000 INJECTION INTRAVENOUS; SUBCUTANEOUS at 13:19

## 2023-06-08 RX ADMIN — CEFEPIME 100 MILLIGRAM(S): 1 INJECTION, POWDER, FOR SOLUTION INTRAMUSCULAR; INTRAVENOUS at 19:07

## 2023-06-08 RX ADMIN — Medication 50 MICROGRAM(S): at 06:02

## 2023-06-08 RX ADMIN — Medication 12.5 MILLIGRAM(S): at 18:29

## 2023-06-08 RX ADMIN — BUDESONIDE AND FORMOTEROL FUMARATE DIHYDRATE 2 PUFF(S): 160; 4.5 AEROSOL RESPIRATORY (INHALATION) at 21:44

## 2023-06-08 RX ADMIN — BUDESONIDE AND FORMOTEROL FUMARATE DIHYDRATE 2 PUFF(S): 160; 4.5 AEROSOL RESPIRATORY (INHALATION) at 10:36

## 2023-06-08 RX ADMIN — CEFEPIME 100 MILLIGRAM(S): 1 INJECTION, POWDER, FOR SOLUTION INTRAMUSCULAR; INTRAVENOUS at 06:00

## 2023-06-08 RX ADMIN — HEPARIN SODIUM 5000 UNIT(S): 5000 INJECTION INTRAVENOUS; SUBCUTANEOUS at 21:44

## 2023-06-08 RX ADMIN — ATORVASTATIN CALCIUM 10 MILLIGRAM(S): 80 TABLET, FILM COATED ORAL at 21:44

## 2023-06-08 RX ADMIN — Medication 325 MILLIGRAM(S): at 12:42

## 2023-06-08 RX ADMIN — TIOTROPIUM BROMIDE 2 PUFF(S): 18 CAPSULE ORAL; RESPIRATORY (INHALATION) at 12:42

## 2023-06-08 RX ADMIN — PANTOPRAZOLE SODIUM 40 MILLIGRAM(S): 20 TABLET, DELAYED RELEASE ORAL at 12:43

## 2023-06-08 RX ADMIN — Medication 12.5 MILLIGRAM(S): at 06:02

## 2023-06-08 NOTE — PROGRESS NOTE ADULT - NS ATTEND AMEND GEN_ALL_CORE FT
Impression: This is an 81 Y/O Female from [facility] . Was sent from NH for AMS and Hypoxia . Had a s/p Right Hip Replacement 2 weeks ago at [facility] . Was in ICU and admitted for Acute Renal failure with Hyperkalemia secondary to Obstructive Uropathy and Acute Hypoxic Respiratory Failure in setting of Acute Metabolic Encephalopthy due to Urine Retention , Uremia and UTI . Has Asthma but no exacerbation at this time.  06-01-23 Negative swab for Covid 19 . Now at Medicine Unit and saturating good room air.      Suggestion:  O2 saturation 96% room air. So far saturating good room air.   Continue PRN Albuterol 90 mcg 2 Puffs Q 6 Hours.   Continue Tiotropium 2 Puffs Daily.   Continue Symbicort 80-4.5 mcg 2 Puffs Twice Daily.     Pulmonary oral hygiene care especially every after oral inhaler used.   DVT/ GI prophylactic. On Heparin 5,000 Units SQ Q8 Hours.
81 yo F w/ Hx Asthma, HTN, GERD, constipation, chronic anemia, Hypothyroidism and recent hip replacement who was sent in from NH for AMS and hypoxia. Admit for acute renal failure w/ hyperkalemia 2/2 urinary obstruction and AHRF. Noted with cloudy urine concern for UTI.     Assessment:  1. Sepsis  2. UTI  3. Acute hypoxia  4. Obstructive uropathy  5. Acute Renal failure   6. Acute encephalopathy   7. Asthma  8. HTN    Plan:  - Remains awake and alert  - Monitor hemodynamics  - Cont oral antihypertensives   - Close neurologic monitoring  - Aspiration precautions  - D/c IV fluids  - IV antibiotics for UTI  - Difficult benavides placement, will maintain for now   - Pain control with IV tylenol if needed   - Oxygen support  - No evidence of DVT in left leg  - Repeat BMP in PM   - Nephrology follow up   - dysphagia diet, was on puree diet in rehab  - DVT prophylaxis  - PT evaluation   - Transfer out of ICU .
IMP: This is an 82 yr old woman  Asthma, HTN, GERD, constipation, chronic anemia, Hypothyroidism and recent hip replacement who was sent in from NH for AMS and hypoxia. Admit for acute renal failure w/ hyperkalemia 2/2 urinary obstruction and AHRF. Noted with cloudy urine concern for UTI.     Assessment:  1. Sepsis  2. UTI  3. Acute hypoxia  4. Obstructive uropathy  5. Acute Renal failure   6. Acute encephalopathy   7. Asthma  8. HTN    Plan:  - Remains awake and alert  - Monitor hemodynamics  - Cont oral antihypertensives   - Close neurologic monitoring  - Aspiration precautions  - D/c IV fluids  - IV antibiotics for UTI  - Difficult benavides placement, will maintain for now   - Pain control with IV tylenol if needed   - Oxygen support  - No evidence of DVT in left leg  - Nephrology follow up   - Dysphagia diet, was on puree diet in rehab  - DVT prophylaxis  - PT evaluation

## 2023-06-08 NOTE — PROGRESS NOTE ADULT - ASSESSMENT
UTI - urine culture negative?  Leukocytosis - fluctuating but no signs of infection    Plan - Change Maxipime to 1gm iv q12hrs for now while WBC count increasing

## 2023-06-08 NOTE — PROGRESS NOTE ADULT - SUBJECTIVE AND OBJECTIVE BOX
Time of Visit:  Patient seen and examined.     MEDICATIONS  (STANDING):  amLODIPine   Tablet 5 milliGRAM(s) Oral daily  atorvastatin 10 milliGRAM(s) Oral at bedtime  budesonide  80 MICROgram(s)/formoterol 4.5 MICROgram(s) Inhaler 2 Puff(s) Inhalation two times a day  cefepime   IVPB 1000 milliGRAM(s) IV Intermittent every 12 hours  ferrous    sulfate 325 milliGRAM(s) Oral daily  heparin   Injectable 5000 Unit(s) SubCutaneous every 8 hours  levothyroxine 50 MICROGram(s) Oral daily  metoprolol tartrate 12.5 milliGRAM(s) Oral two times a day  pantoprazole   Suspension 40 milliGRAM(s) Oral daily  tiotropium 2.5 MICROgram(s) Inhaler 2 Puff(s) Inhalation daily      MEDICATIONS  (PRN):  albuterol    90 MICROgram(s) HFA Inhaler 2 Puff(s) Inhalation every 6 hours PRN Shortness of Breath and/or Wheezing       Medications up to date at time of exam.    ROS; No fever, chills, cough, nasal congestion on exam.   PHYSICAL EXAMINATION:    Vital Signs Last 24 Hrs  T(C): 37.4 (08 Jun 2023 05:15), Max: 37.7 (07 Jun 2023 20:28)  T(F): 99.3 (08 Jun 2023 05:15), Max: 99.9 (07 Jun 2023 20:28)  HR: 89 (08 Jun 2023 05:15) (85 - 96)  BP: 133/62 (08 Jun 2023 05:15) (114/56 - 133/62)  BP(mean): 74 (07 Jun 2023 20:28) (68 - 77)  RR: 16 (08 Jun 2023 05:15) (16 - 20)  SpO2: 95% (08 Jun 2023 05:15) (93% - 95%)    Parameters below as of 08 Jun 2023 05:15  Patient On (Oxygen Delivery Method): room air       (if applicable)    General : Alert and oriented. Poor historian . No acute distress .     HEENT: Head is normocephalic and atraumatic. No nasal tenderness. Extraocular muscles are intact. Mucous membranes are moist.     NECK: Supple, no palpable adenopathy.    LUNGS: Non labored. No wheezing . No use of accessory muscle.     HEART: S1 S2 Regular rate and no click / rub.     ABDOMEN: Soft, nontender, and nondistended.  Active bowel sounds.     ; No bladder distention and tenderness .     NEUROLOGIC: Awake, alert, oriented.     SKIN: Warm and moist . Non diaphoretic.       LABS:                        10.3   19.53 )-----------( 161      ( 07 Jun 2023 06:27 )             32.7     06-07    143  |  107  |  7   ----------------------------<  100<H>  4.2   |  34<H>  |  0.28<L>    Ca    8.5      07 Jun 2023 06:27  Phos  2.2     06-07  Mg     1.7     06-07    TPro  5.9<L>  /  Alb  2.1<L>  /  TBili  0.3  /  DBili  x   /  AST  35  /  ALT  29  /  AlkPhos  111  06-07                        MICROBIOLOGY: (if applicable)    RADIOLOGY & ADDITIONAL STUDIES:  EKG:   CXR:  ECHO:    IMPRESSION: 82y Female PAST MEDICAL & SURGICAL HISTORY:  Asthma with acute exacerbation, unspecified asthma severity      Essential hypertension      Hypothyroid      GERD (gastroesophageal reflux disease)      Constipation      Fe deficiency anemia      History of arthroplasty of right hip    Impression: This is an 83 Y/O Female from [facility] . Was sent from NH for AMS and Hypoxia . Had a s/p Right Hip Replacement 2 weeks ago at [facility] . Was in ICU and admitted for Acute Renal failure with Hyperkalemia secondary to Obstructive Uropathy and Acute Hypoxic Respiratory Failure in setting of Acute Metabolic Encephalopthy due to Urine Retention , Uremia and UTI . Has Asthma but no exacerbation at this time.  06-01-23 Negative swab for Covid 19 . Now at Medicine Unit and saturating good room air.      Suggestion:  O2 saturation 96% room air. So far saturating good room air.   Continue PRN Albuterol 90 mcg 2 Puffs Q 6 Hours.   Continue Tiotropium 2 Puffs Daily.   Continue Symbicort 80-4.5 mcg 2 Puffs Twice Daily.     Pulmonary oral hygiene care especially every after oral inhaler used.   DVT/ GI prophylactic. On Heparin 5,000 Units SQ Q8 Hours.

## 2023-06-08 NOTE — PROGRESS NOTE ADULT - ASSESSMENT
81 yo F w/ Hx Asthma, primary HTN, GERD, constipation, Fe def Anemia, Hypothyroidism and recent hip replacement who was sent in from NH for AMS and hypoxia. Ms. Garcia had a RIGHT hip replacement approx 2 weeks ago at [facility] w/ DC to [facility] for sub acute rehab.p/w decreased PO intake.  NH started her on Oxygen 6L due to new hypoxia.  In ED: Patient arrived with BUN Cr 173/7.85 and serum K 7.2 w/ EKG showing Sinus Tachycardia, no T wave changes  CT abd showing large distended bladder w/ b/l hydronephrosis. Given 2L IVF, Insulin 5 and Dextrose. Ram placed with urine studies. Urine appeared w/ bipin pus. Given 1x Cefepime dose.   IN ICU Patient's kidney function and potassium improved. CHIP likely due to post-renal obstruction. Acute encephalopathy likely due to uremia. CTH showed no acute findings. She was also treated for AHRF w/ duonebs. Currently tolerating nasal cannula. Venous Doppler of LE was negative for DVT. Urine Culture was negative. She passed her swallow evaluation and currently back to her baseline mental status.    Downgrade to Medicine 6/6

## 2023-06-08 NOTE — CHART NOTE - NSCHARTNOTEFT_GEN_A_CORE
Reassessment:     Patient is a 82y old  Female who presents with a chief complaint of Hyperkalemia, Acute/Chronic renal failure (2023 12:10)      Factors impacting intake: [ ] none [ ] nausea  [ ] vomiting [ ] diarrhea [ ] constipation  [ ]chewing problems [ ] swallowing issues  [ X] other: HTN, GERD, constipation, Fe def Anemia, Hypothyroidism and recent hip replacement     Diet Prescription: Soft & Bite Sized, DASH/TLC (sodium & cholesterol restricted), Ensure Compact, 4oz 1 serving BID - 23    Intake: Patient seen by SLP/team & down grade from ICU to medical floor on 23. Visited pt. "resting", spoke with covering PCA, pt. oral intake "poor", consumed ~40% of lunch tray however drank all of "Ensure Compact, (4oz) when offered there will increase Ensure Compact, 4oz 3x per meal, no reports of GI distress, rec. c/w diet as ordered, awaiting placement, d/w nursing. RD available.     Daily Weight in k.2 (2023 07:00)  Weight in k.1 (2023 05:10)  Weight in k.3 (2023 06:00)  Weight in k.3 (2023 08:00)  Weight in k.6 (2023 07:00)  Weight in k.1 (2023 07:00)  Weight in k.8 (2023 07:00)    % Weight Changes: wt. fluctuations noted possible fluid shifts     Pertinent Medications: MEDICATIONS  (STANDING):  amLODIPine   Tablet 5 milliGRAM(s) Oral daily  atorvastatin 10 milliGRAM(s) Oral at bedtime  budesonide  80 MICROgram(s)/formoterol 4.5 MICROgram(s) Inhaler 2 Puff(s) Inhalation two times a day  cefepime   IVPB 1000 milliGRAM(s) IV Intermittent every 12 hours  ferrous    sulfate 325 milliGRAM(s) Oral daily  heparin   Injectable 5000 Unit(s) SubCutaneous every 8 hours  levothyroxine 50 MICROGram(s) Oral daily  metoprolol tartrate 12.5 milliGRAM(s) Oral two times a day  pantoprazole   Suspension 40 milliGRAM(s) Oral daily  tiotropium 2.5 MICROgram(s) Inhaler 2 Puff(s) Inhalation daily    MEDICATIONS  (PRN):  albuterol    90 MICROgram(s) HFA Inhaler 2 Puff(s) Inhalation every 6 hours PRN Shortness of Breath and/or Wheezing    Pertinent Labs:  Na143 mmol/L Glu 100 mg/dL<H> K+ 4.2 mmol/L Cr  0.28 mg/dL<L> BUN 7 mg/dL  Phos 2.2 mg/dL<L>  Alb 2.1 g/dL<L>     CAPILLARY BLOOD GLUCOSE    Skin: right hip dressing     Estimated Needs:   [ X] no change since previous assessment  [ ] recalculated:     Previous Nutrition Diagnosis:   [ ] Inadequate Energy Intake [ ]Inadequate Oral Intake [ ] Excessive Energy Intake   [ ] Underweight [ ] Increased Nutrient Needs [ ] Overweight/Obesity   [ ] Altered GI Function [ ] Unintended Weight Loss [ ] Food & Nutrition Related Knowledge Deficit [Severe ] Malnutrition     Nutrition Diagnosis is [X ] ongoing  [ ] resolved [ ] not applicable     New Nutrition Diagnosis: [ ] not applicable     Interventions: Optimal nutrition meeting >75% of energy nutrient needs via tolerated route     Recommend  [ ] Change Diet To:  [X ] Nutrition Supplement: Add Ensure Enlive 1 serving TID (720 Kcal, Protein 27 grams) with MVI/minerals/Vit. C 500mg BID daily   [ ] Nutrition Support  [ X] Other: Nursing to continue feeding assistance and encouragement with aspiration precaution     Monitoring and Evaluation:   [ ] PO intake [ x ] Tolerance to diet prescription [ x ] weights [ x ] labs[ x ] follow up per protocol  [ ] other:

## 2023-06-08 NOTE — PROGRESS NOTE ADULT - SUBJECTIVE AND OBJECTIVE BOX
82y Female    Meds:  cefepime   IVPB 1000 milliGRAM(s) IV Intermittent every 12 hours    Allergies    No Known Allergies    Intolerances        VITALS:  Vital Signs Last 24 Hrs  T(C): 36.9 (08 Jun 2023 14:25), Max: 37.7 (07 Jun 2023 20:28)  T(F): 98.5 (08 Jun 2023 14:25), Max: 99.9 (07 Jun 2023 20:28)  HR: 96 (08 Jun 2023 14:25) (85 - 96)  BP: 130/53 (08 Jun 2023 14:25) (130/53 - 133/62)  BP(mean): 72 (08 Jun 2023 14:25) (72 - 74)  RR: 17 (08 Jun 2023 14:25) (16 - 18)  SpO2: 95% (08 Jun 2023 14:25) (94% - 96%)    Parameters below as of 08 Jun 2023 14:25  Patient On (Oxygen Delivery Method): room air        LABS/DIAGNOSTIC TESTS:                          10.3   19.53 )-----------( 161      ( 07 Jun 2023 06:27 )             32.7         06-07    143  |  107  |  7   ----------------------------<  100<H>  4.2   |  34<H>  |  0.28<L>    Ca    8.5      07 Jun 2023 06:27  Phos  2.2     06-07  Mg     1.7     06-07    TPro  5.9<L>  /  Alb  2.1<L>  /  TBili  0.3  /  DBili  x   /  AST  35  /  ALT  29  /  AlkPhos  111  06-07      LIVER FUNCTIONS - ( 07 Jun 2023 06:27 )  Alb: 2.1 g/dL / Pro: 5.9 g/dL / ALK PHOS: 111 U/L / ALT: 29 U/L DA / AST: 35 U/L / GGT: x             CULTURES: Catheterized Catheterized  06-03 @ 13:55   No growth  --  --      Clean Catch Clean Catch (Midstream)  06-01 @ 20:55   No growth  --  --      .Blood Blood-Peripheral  06-01 @ 18:50   No Growth Final  --  --      .Blood Blood-Peripheral  06-01 @ 18:45   No Growth Final  --  --            RADIOLOGY:      ROS:  [  ] UNABLE TO ELICIT 82y Female who is looking fantastic today and is feeling great, she has no fevers and is totally asymptomatic at this time, she has no fevers, chills , coughing , SOB , her benavides was removed and she is able to urinate without a problem or symptoms. Her WBC count is fluctuating up and down even though she is on antibiotics.    Meds:  cefepime   IVPB 1000 milliGRAM(s) IV Intermittent every 12 hours    Allergies    No Known Allergies    Intolerances        VITALS:  Vital Signs Last 24 Hrs  T(C): 36.9 (08 Jun 2023 14:25), Max: 37.7 (07 Jun 2023 20:28)  T(F): 98.5 (08 Jun 2023 14:25), Max: 99.9 (07 Jun 2023 20:28)  HR: 96 (08 Jun 2023 14:25) (85 - 96)  BP: 130/53 (08 Jun 2023 14:25) (130/53 - 133/62)  BP(mean): 72 (08 Jun 2023 14:25) (72 - 74)  RR: 17 (08 Jun 2023 14:25) (16 - 18)  SpO2: 95% (08 Jun 2023 14:25) (94% - 96%)    Parameters below as of 08 Jun 2023 14:25  Patient On (Oxygen Delivery Method): room air        LABS/DIAGNOSTIC TESTS:                          10.3   19.53 )-----------( 161      ( 07 Jun 2023 06:27 )             32.7         06-07    143  |  107  |  7   ----------------------------<  100<H>  4.2   |  34<H>  |  0.28<L>    Ca    8.5      07 Jun 2023 06:27  Phos  2.2     06-07  Mg     1.7     06-07    TPro  5.9<L>  /  Alb  2.1<L>  /  TBili  0.3  /  DBili  x   /  AST  35  /  ALT  29  /  AlkPhos  111  06-07      LIVER FUNCTIONS - ( 07 Jun 2023 06:27 )  Alb: 2.1 g/dL / Pro: 5.9 g/dL / ALK PHOS: 111 U/L / ALT: 29 U/L DA / AST: 35 U/L / GGT: x             CULTURES: Catheterized Catheterized  06-03 @ 13:55   No growth  --  --      Clean Catch Clean Catch (Midstream)  06-01 @ 20:55   No growth  --  --      .Blood Blood-Peripheral  06-01 @ 18:50   No Growth Final  --  --      .Blood Blood-Peripheral  06-01 @ 18:45   No Growth Final  --  --            RADIOLOGY:      ROS:  [  ] UNABLE TO ELICIT

## 2023-06-08 NOTE — PROGRESS NOTE ADULT - RESPIRATORY
clear to auscultation bilaterally/no wheezes/no rales/no rhonchi
normal/clear to auscultation bilaterally/no wheezes/no rales/no rhonchi
normal/clear to auscultation bilaterally/no wheezes/no rales/no rhonchi

## 2023-06-08 NOTE — PROGRESS NOTE ADULT - NEGATIVE CARDIOVASCULAR SYMPTOMS
no chest pain/no dyspnea on exertion/no peripheral edema
no chest pain/no dyspnea on exertion/no peripheral edema
no chest pain

## 2023-06-08 NOTE — PROGRESS NOTE ADULT - SUBJECTIVE AND OBJECTIVE BOX
Patient is a 82y old  Female who presents with a chief complaint of Hyperkalemia, Acute/Chronic renal failure (08 Jun 2023 11:08)      INTERVAL HPI/OVERNIGHT EVENTS: no acute events overnight       REVIEW OF SYSTEMS:  CONSTITUTIONAL: No fever, chills  ENMT:  No difficulty hearing, no change in vision  NECK: No pain or stiffness  RESPIRATORY: No cough, SOB  CARDIOVASCULAR: No chest pain, palpitations  GASTROINTESTINAL: No abdominal pain. No nausea, vomiting, or diarrhea  GENITOURINARY: No dysuria  NEUROLOGICAL: No HA  SKIN: No itching, burning, rashes, or lesions   LYMPH NODES: No enlarged glands  ENDOCRINE: No heat or cold intolerance; No hair loss  MUSCULOSKELETAL: No joint pain or swelling; No muscle, back, or extremity pain  PSYCHIATRIC: No depression, anxiety  HEME/LYMPH: No easy bruising, or bleeding gums    T(C): 37.4 (06-08-23 @ 05:15), Max: 37.7 (06-07-23 @ 20:28)  HR: 89 (06-08-23 @ 05:15) (85 - 96)  BP: 133/62 (06-08-23 @ 05:15) (114/56 - 133/62)  RR: 16 (06-08-23 @ 05:15) (16 - 20)  SpO2: 95% (06-08-23 @ 05:15) (93% - 95%)  Wt(kg): --Vital Signs Last 24 Hrs  T(C): 37.4 (08 Jun 2023 05:15), Max: 37.7 (07 Jun 2023 20:28)  T(F): 99.3 (08 Jun 2023 05:15), Max: 99.9 (07 Jun 2023 20:28)  HR: 89 (08 Jun 2023 05:15) (85 - 96)  BP: 133/62 (08 Jun 2023 05:15) (114/56 - 133/62)  BP(mean): 74 (07 Jun 2023 20:28) (68 - 77)  RR: 16 (08 Jun 2023 05:15) (16 - 20)  SpO2: 95% (08 Jun 2023 05:15) (93% - 95%)    Parameters below as of 08 Jun 2023 05:15  Patient On (Oxygen Delivery Method): room air      < from: US Renal (06.07.23 @ 10:32) >    ACC: [id number] EXAM:  US KIDNEY(S)   ORDERED BY: EVIN ROWLAND     PROCEDURE DATE:  06/07/2023          INTERPRETATION:  CLINICAL INFORMATION: Postacute kidney injury.    COMPARISON: None available.    TECHNIQUE: Sonography of the kidneys and bladder.    FINDINGS:  Right kidney: 11.0 cm. No renal mass, hydronephrosis or calculi.    Left kidney: 11.4 cm. No renal mass, hydronephrosis or calculi.    Urinary bladder: Collapsed around a Ram catheter.    IMPRESSION:  Normal renal ultrasound.    ---End of Report ---            AYLEEN SIDDIQUI MD; Attending Radiologist  This document has been electronically signed. Jun 7 2023 10:44AM    < end of copied text >  < from: US Duplex Venous Lower Ext Ltd, Left (06.02.23 @ 10:56) >    ACC: [id number] EXAM:  US DPLX LWR EXT VEINS LTD LT   ORDERED BY: BENNY KANG     PROCEDURE DATE:  06/02/2023          INTERPRETATION:  CLINICAL INFORMATION: Recent hip replacement. Change in   mental status.    COMPARISON: None available.    TECHNIQUE: Duplex sonography of the LEFT LOWER extremity veins with color   and spectral Doppler, with and without compression.    FINDINGS:    There is normal compressibility of the left common femoral, femoral and   popliteal veins.  Unable to evaluate the contralateral common femoral vein due to overlying   brace.  Doppler examination shows normal spontaneous and phasic flow.    No calf vein thrombosis is detected, although there is nonvisualization   of the left soleal and gastrocnemius veins.    IMPRESSION:  No evidence of left lower extremity deep venous thrombosis.            --- End of Report ---          ERIC BLEVINS MD; Resident Radiologist  This document has been electronically signed.  DAQUAN NIETO MD; Attending Radiologist  This document has been electronically signed. Jun 2 2023 11:36AM    < end of copied text >    PHYSICAL EXAM:  GENERAL: NAD  EYES: clear conjunctiva; EOMI  ENMT: Moist mucous membranes  NECK: Supple, No JVD, Normal thyroid  CHEST/LUNG: Clear to auscultation bilaterally; No rales, rhonchi, wheezing, or rubs  HEART: S1, S2, Regular rate and rhythm  ABDOMEN: Soft, Nontender, Nondistended; Bowel sounds present  NEURO: Alert & Oriented X3  EXTREMITIES: No LE edema, no calf tenderness  LYMPH: No lymphadenopathy noted  SKIN: No rashes or lesions    Consultant(s) Notes Reviewed:  [x ] YES  [ ] NO  Care Discussed with Consultants/Other Providers [ x] YES  [ ] NO    LABS:                        10.3   19.53 )-----------( 161      ( 07 Jun 2023 06:27 )             32.7     06-07    143  |  107  |  7   ----------------------------<  100<H>  4.2   |  34<H>  |  0.28<L>    Ca    8.5      07 Jun 2023 06:27  Phos  2.2     06-07  Mg     1.7     06-07    TPro  5.9<L>  /  Alb  2.1<L>  /  TBili  0.3  /  DBili  x   /  AST  35  /  ALT  29  /  AlkPhos  111  06-07      CAPILLARY BLOOD GLUCOSE                RADIOLOGY & ADDITIONAL TESTS:    Imaging Personally Reviewed:  [ ] YES  [ ] NO

## 2023-06-08 NOTE — PROGRESS NOTE ADULT - NEUROLOGICAL
[FreeTextEntry1] : D/w patient the need for tissue sampling via D&C hysteroscopy as well as removal of endocervical polyp. Based on physical exam findings I may also need to do a cervical biopsy as her anterior cervical lip is visually abnormal. I discussed at length with the patient the nature, purpose, risks, benefits, and alternatives to dilation and curettage possible hysteroscopy. She understands the risks to include (but not be limited to): uterine perforation with possible need for laparoscopy and/or laparotomy; infection with need for hospitalization; fluid overload with possible critical illness as a consequence; and bleeding with need for transfusion.  The patient agrees to proceed.\par 
details…

## 2023-06-08 NOTE — PROGRESS NOTE ADULT - NS_MD_PANP_GEN_ALL_CORE
normal...
Attending and PA/NP shared services statement (NON-critical care):
well appearing and in no apparent distress.

## 2023-06-08 NOTE — CHART NOTE - NSCHARTNOTESELECT_GEN_ALL_CORE
Family Update/Event Note
Nutrition Services
Nutrition Services
Family Update/Event Note
Transfer Note

## 2023-06-09 LAB
ANION GAP SERPL CALC-SCNC: 2 MMOL/L — LOW (ref 5–17)
BUN SERPL-MCNC: 12 MG/DL — SIGNIFICANT CHANGE UP (ref 7–18)
CALCIUM SERPL-MCNC: 9.2 MG/DL — SIGNIFICANT CHANGE UP (ref 8.4–10.5)
CHLORIDE SERPL-SCNC: 105 MMOL/L — SIGNIFICANT CHANGE UP (ref 96–108)
CO2 SERPL-SCNC: 32 MMOL/L — HIGH (ref 22–31)
CREAT SERPL-MCNC: 0.44 MG/DL — LOW (ref 0.5–1.3)
EGFR: 97 ML/MIN/1.73M2 — SIGNIFICANT CHANGE UP
GLUCOSE SERPL-MCNC: 140 MG/DL — HIGH (ref 70–99)
HCT VFR BLD CALC: 34.5 % — SIGNIFICANT CHANGE UP (ref 34.5–45)
HGB BLD-MCNC: 11.1 G/DL — LOW (ref 11.5–15.5)
MCHC RBC-ENTMCNC: 32.2 GM/DL — SIGNIFICANT CHANGE UP (ref 32–36)
MCHC RBC-ENTMCNC: 33.4 PG — SIGNIFICANT CHANGE UP (ref 27–34)
MCV RBC AUTO: 103.9 FL — HIGH (ref 80–100)
NRBC # BLD: 0 /100 WBCS — SIGNIFICANT CHANGE UP (ref 0–0)
PLATELET # BLD AUTO: 230 K/UL — SIGNIFICANT CHANGE UP (ref 150–400)
POTASSIUM SERPL-MCNC: 4.8 MMOL/L — SIGNIFICANT CHANGE UP (ref 3.5–5.3)
POTASSIUM SERPL-SCNC: 4.8 MMOL/L — SIGNIFICANT CHANGE UP (ref 3.5–5.3)
RBC # BLD: 3.32 M/UL — LOW (ref 3.8–5.2)
RBC # FLD: 12.7 % — SIGNIFICANT CHANGE UP (ref 10.3–14.5)
SARS-COV-2 RNA SPEC QL NAA+PROBE: SIGNIFICANT CHANGE UP
SODIUM SERPL-SCNC: 139 MMOL/L — SIGNIFICANT CHANGE UP (ref 135–145)
WBC # BLD: 17.02 K/UL — HIGH (ref 3.8–10.5)
WBC # FLD AUTO: 17.02 K/UL — HIGH (ref 3.8–10.5)

## 2023-06-09 RX ADMIN — Medication 325 MILLIGRAM(S): at 14:18

## 2023-06-09 RX ADMIN — TIOTROPIUM BROMIDE 2 PUFF(S): 18 CAPSULE ORAL; RESPIRATORY (INHALATION) at 14:18

## 2023-06-09 RX ADMIN — Medication 12.5 MILLIGRAM(S): at 17:26

## 2023-06-09 RX ADMIN — Medication 12.5 MILLIGRAM(S): at 05:43

## 2023-06-09 RX ADMIN — BUDESONIDE AND FORMOTEROL FUMARATE DIHYDRATE 2 PUFF(S): 160; 4.5 AEROSOL RESPIRATORY (INHALATION) at 10:00

## 2023-06-09 RX ADMIN — HEPARIN SODIUM 5000 UNIT(S): 5000 INJECTION INTRAVENOUS; SUBCUTANEOUS at 21:56

## 2023-06-09 RX ADMIN — AMLODIPINE BESYLATE 5 MILLIGRAM(S): 2.5 TABLET ORAL at 05:43

## 2023-06-09 RX ADMIN — HEPARIN SODIUM 5000 UNIT(S): 5000 INJECTION INTRAVENOUS; SUBCUTANEOUS at 14:19

## 2023-06-09 RX ADMIN — BUDESONIDE AND FORMOTEROL FUMARATE DIHYDRATE 2 PUFF(S): 160; 4.5 AEROSOL RESPIRATORY (INHALATION) at 21:56

## 2023-06-09 RX ADMIN — HEPARIN SODIUM 5000 UNIT(S): 5000 INJECTION INTRAVENOUS; SUBCUTANEOUS at 05:44

## 2023-06-09 RX ADMIN — CEFEPIME 100 MILLIGRAM(S): 1 INJECTION, POWDER, FOR SOLUTION INTRAMUSCULAR; INTRAVENOUS at 05:44

## 2023-06-09 RX ADMIN — Medication 50 MICROGRAM(S): at 05:44

## 2023-06-09 RX ADMIN — PANTOPRAZOLE SODIUM 40 MILLIGRAM(S): 20 TABLET, DELAYED RELEASE ORAL at 14:19

## 2023-06-09 RX ADMIN — CEFEPIME 100 MILLIGRAM(S): 1 INJECTION, POWDER, FOR SOLUTION INTRAMUSCULAR; INTRAVENOUS at 17:33

## 2023-06-09 RX ADMIN — ATORVASTATIN CALCIUM 10 MILLIGRAM(S): 80 TABLET, FILM COATED ORAL at 21:56

## 2023-06-09 NOTE — PROGRESS NOTE ADULT - ASSESSMENT
83 yo F w/ Hx Asthma, primary HTN, GERD, constipation, Fe def Anemia, Hypothyroidism and recent hip replacement who was sent in from NH for AMS and hypoxia. Ms. Garcia had a RIGHT hip replacement approx 2 weeks ago at [facility] w/ DC to [facility] for sub acute rehab.p/w decreased PO intake.  NH started her on Oxygen 6L due to new hypoxia.  In ED: Patient arrived with BUN Cr 173/7.85 and serum K 7.2 w/ EKG showing Sinus Tachycardia, no T wave changes  CT abd showing large distended bladder w/ b/l hydronephrosis. Given 2L IVF, Insulin 5 and Dextrose. Ram placed with urine studies. Urine appeared w/ bipin pus. Given 1x Cefepime dose.   IN ICU Patient's kidney function and potassium improved. CHIP likely due to post-renal obstruction. Acute encephalopathy likely due to uremia. CTH showed no acute findings. She was also treated for AHRF w/ duonebs. Currently tolerating nasal cannula. Venous Doppler of LE was negative for DVT. Urine Culture was negative. She passed her swallow evaluation and currently back to her baseline mental status.    Downgrade to Medicine 6/6

## 2023-06-09 NOTE — PROGRESS NOTE ADULT - SUBJECTIVE AND OBJECTIVE BOX
Time of Visit:  Patient seen and examined.     MEDICATIONS  (STANDING):  amLODIPine   Tablet 5 milliGRAM(s) Oral daily  atorvastatin 10 milliGRAM(s) Oral at bedtime  budesonide  80 MICROgram(s)/formoterol 4.5 MICROgram(s) Inhaler 2 Puff(s) Inhalation two times a day  cefepime   IVPB 1000 milliGRAM(s) IV Intermittent every 12 hours  ferrous    sulfate 325 milliGRAM(s) Oral daily  heparin   Injectable 5000 Unit(s) SubCutaneous every 8 hours  levothyroxine 50 MICROGram(s) Oral daily  metoprolol tartrate 12.5 milliGRAM(s) Oral two times a day  pantoprazole   Suspension 40 milliGRAM(s) Oral daily  tiotropium 2.5 MICROgram(s) Inhaler 2 Puff(s) Inhalation daily      MEDICATIONS  (PRN):  albuterol    90 MICROgram(s) HFA Inhaler 2 Puff(s) Inhalation every 6 hours PRN Shortness of Breath and/or Wheezing       Medications up to date at time of exam.      PHYSICAL EXAMINATION:  Patient has no new complaints.  GENERAL: The patient is a well-developed, well-nourished, in no apparent distress.     Vital Signs Last 24 Hrs  T(C): 36.8 (09 Jun 2023 12:30), Max: 37.3 (08 Jun 2023 20:34)  T(F): 98.2 (09 Jun 2023 12:30), Max: 99.1 (08 Jun 2023 20:34)  HR: 102 (09 Jun 2023 17:24) (84 - 102)  BP: 116/64 (09 Jun 2023 17:24) (116/64 - 131/62)  BP(mean): 83 (09 Jun 2023 12:30) (83 - 83)  RR: 16 (09 Jun 2023 17:24) (16 - 18)  SpO2: 93% (09 Jun 2023 12:30) (92% - 94%)    Parameters below as of 09 Jun 2023 12:30  Patient On (Oxygen Delivery Method): room air       (if applicable)    Chest Tube (if applicable)    HEENT: Head is normocephalic and atraumatic. Extraocular muscles are intact. Mucous membranes are moist.     NECK: Supple, no palpable adenopathy.    LUNGS: Clear to auscultation, no wheezing, rales, or rhonchi.    HEART: Regular rate and rhythm without murmur.    ABDOMEN: Soft, nontender, and nondistended.  No hepatosplenomegaly is noted.    : No painful voiding, no pelvic pain    EXTREMITIES: Without any cyanosis, clubbing, rash, lesions or edema.    NEUROLOGIC: Awake, alert, oriented, grossly intact    SKIN: Warm, dry, good turgor.      LABS:                        11.1   17.02 )-----------( 230      ( 09 Jun 2023 11:30 )             34.5     06-09    139  |  105  |  12  ----------------------------<  140<H>  4.8   |  32<H>  |  0.44<L>    Ca    9.2      09 Jun 2023 11:30                          MICROBIOLOGY: (if applicable)    RADIOLOGY & ADDITIONAL STUDIES:  EKG:   CXR:  ECHO:    IMPRESSION: 82y Female PAST MEDICAL & SURGICAL HISTORY:  Asthma with acute exacerbation, unspecified asthma severity      Essential hypertension      Hypothyroid      GERD (gastroesophageal reflux disease)      Constipation      Fe deficiency anemia      History of arthroplasty of right hip       p/w           RECOMMENDATIONS:   Time of Visit:  Patient seen and examined.     MEDICATIONS  (STANDING):  amLODIPine   Tablet 5 milliGRAM(s) Oral daily  atorvastatin 10 milliGRAM(s) Oral at bedtime  budesonide  80 MICROgram(s)/formoterol 4.5 MICROgram(s) Inhaler 2 Puff(s) Inhalation two times a day  cefepime   IVPB 1000 milliGRAM(s) IV Intermittent every 12 hours  ferrous    sulfate 325 milliGRAM(s) Oral daily  heparin   Injectable 5000 Unit(s) SubCutaneous every 8 hours  levothyroxine 50 MICROGram(s) Oral daily  metoprolol tartrate 12.5 milliGRAM(s) Oral two times a day  pantoprazole   Suspension 40 milliGRAM(s) Oral daily  tiotropium 2.5 MICROgram(s) Inhaler 2 Puff(s) Inhalation daily      MEDICATIONS  (PRN):  albuterol    90 MICROgram(s) HFA Inhaler 2 Puff(s) Inhalation every 6 hours PRN Shortness of Breath and/or Wheezing       Medications up to date at time of exam.      PHYSICAL EXAMINATION:  Patient has no new complaints.  GENERAL: The patient is a well-developed, well-nourished, in no apparent distress.     Vital Signs Last 24 Hrs  T(C): 36.8 (09 Jun 2023 12:30), Max: 37.3 (08 Jun 2023 20:34)  T(F): 98.2 (09 Jun 2023 12:30), Max: 99.1 (08 Jun 2023 20:34)  HR: 102 (09 Jun 2023 17:24) (84 - 102)  BP: 116/64 (09 Jun 2023 17:24) (116/64 - 131/62)  BP(mean): 83 (09 Jun 2023 12:30) (83 - 83)  RR: 16 (09 Jun 2023 17:24) (16 - 18)  SpO2: 93% (09 Jun 2023 12:30) (92% - 94%)    Parameters below as of 09 Jun 2023 12:30  Patient On (Oxygen Delivery Method): room air       (if applicable)    Chest Tube (if applicable)    HEENT: Head is normocephalic and atraumatic. Extraocular muscles are intact. Mucous membranes are moist.     NECK: Supple, no palpable adenopathy.    LUNGS: Clear to auscultation, no wheezing, rales, or rhonchi.    HEART: Regular rate and rhythm without murmur.    ABDOMEN: Soft, nontender, and nondistended.  No hepatosplenomegaly is noted.    : No painful voiding, no pelvic pain    EXTREMITIES: Without any cyanosis, clubbing, rash, lesions or edema.    NEUROLOGIC: Awake, alert, oriented, grossly intact    SKIN: Warm, dry, good turgor.      LABS:                        11.1   17.02 )-----------( 230      ( 09 Jun 2023 11:30 )             34.5     06-09    139  |  105  |  12  ----------------------------<  140<H>  4.8   |  32<H>  |  0.44<L>    Ca    9.2      09 Jun 2023 11:30                          MICROBIOLOGY: (if applicable)    RADIOLOGY & ADDITIONAL STUDIES:  EKG:   CXR:  ECHO:    IMPRESSION: 82y Female PAST MEDICAL & SURGICAL HISTORY:  Asthma with acute exacerbation, unspecified asthma severity      Essential hypertension      Hypothyroid      GERD (gastroesophageal reflux disease)      Constipation      Fe deficiency anemia      History of arthroplasty of right hip       p/w       Impression: This is an 81 Y/O Female from [facility] . Was sent from NH for AMS and Hypoxia . Had a s/p Right Hip Replacement 2 weeks ago at [facility] . Was in ICU and admitted for Acute Renal failure with Hyperkalemia secondary to Obstructive Uropathy and Acute Hypoxic Respiratory Failure in setting of Acute Metabolic Encephalopthy due to Urine Retention , Uremia and UTI . Has Asthma but no exacerbation at this time.  06-01-23 Negative swab for Covid 19 . Now at Medicine Unit and saturating good room air.      Suggestion:  O2 saturation 96% room air. So far saturating good room air.   Continue PRN Albuterol 90 mcg 2 Puffs Q 6 Hours.   Continue Tiotropium 2 Puffs Daily.   Continue Symbicort 80-4.5 mcg 2 Puffs Twice Daily.     Pulmonary oral hygiene care especially every after oral inhaler used.   DVT/ GI prophylactic. On Heparin 5,000 Units SQ Q8 Hours.

## 2023-06-09 NOTE — PROGRESS NOTE ADULT - ASSESSMENT
81 yo F w/ Hx Asthma, primary HTN, GERD, constipation, Fe def Anemia, Hypothyroidism and recent hip replacement who was sent in from NH for AMS and hypoxia. Ms. Garcia had a RIGHT hip replacement approx 2 weeks ago at [facility] w/ DC to [facility] for sub acute rehab.p/w decreased PO intake.  NH started her on Oxygen 6L due to new hypoxia.  In ED: Patient arrived with BUN Cr 173/7.85 and serum K 7.2 w/ EKG showing Sinus Tachycardia, no T wave changes  CT abd showing large distended bladder w/ b/l hydronephrosis. Given 2L IVF, Insulin 5 and Dextrose. Ram placed with urine studies. Urine appeared w/ bipin pus. Given 1x Cefepime dose.   IN ICU Patient's kidney function and potassium improved. CHIP likely due to post-renal obstruction. Acute encephalopathy likely due to uremia. CTH showed no acute findings. She was also treated for AHRF w/ duonebs. Currently tolerating nasal cannula. Venous Doppler of LE was negative for DVT. Urine Culture was negative. She passed her swallow evaluation and currently back to her baseline mental status.    Downgrade to Medicine 6/6

## 2023-06-09 NOTE — PROGRESS NOTE ADULT - SUBJECTIVE AND OBJECTIVE BOX
PATIENT SEEN AND EXAMINED ON :- 6/9/23  DATE OF SERVICE:   6/9/23          Interim events noted,Labs ,Radiological studies and Cardiology tests reviewed .    MR#[id number]  PATIENT NAME:-LEWIS RODRIGUEZ         [facility] COURSE: HPI:  81 yo F w/ Hx Asthma, primary HTN, GERD, constipation, Fe def Anemia, Hypothyroidism and recent hip replacement who was sent in from NH for AMS and hypoxia. Ms. Rodriguez had a RIGHT hip replacement approx 2 weeks ago at [facility] w/ DC to [facility] for sub acute rehab. Daughter reports that she had began to have diminished PO intake x few days and is not speaking as she usually does. NH started her on Oxygen 6L due to new hypoxia but she does not use O2 at baseline.     In ED: Patient arrived with BUN Cr 173/7.85 and serum K 7.2 w/ EKG showing no peaked T waves. Vitals notable for hypoxia, on 5L NC maintaining 94%.  CT abd showing large distended bladder w/ b/l hydronephrosis. Given 2L IVF, Insulin 5 and Dextrose. Ram placed with urine studies. Urine appeared w/ bipin pus. Given 1x Cefepime dose. 1x Duoneb and ABG for hypoxia. (01 Jun 2023 20:12)      INTERIM EVENTS:Patient seen at bedside ,interim events noted.      PMH -reviewed admission note, no change since admission  HEART FAILURE: Acute[ ]Chronic[ ] Systolic[ ] Diastolic[ ] Combined Systolic and Diastolic[ ]  CAD[ ] CABG[ ] PCI[ ]  DEVICES[ ] PPM[ ] ICD[ ] ILR[ ]  ATRIAL FIBRILLATION[ ] Paroxysmal[ ] Permanent[ ] CHADS2-[  ]  CHIP[ ] CKD1[ ] CKD2[ ] CKD3[ ] CKD4[ ] ESRD[ ]  COPD[ ] HTN[ ]   DM[ ] Type1[ ] Type 2[ ]   CVA[ ] Paresis[ ]    AMBULATION: Assisted[ ] Cane/walker[ ] Independent[ ]    MEDICATIONS  (STANDING):  amLODIPine   Tablet 5 milliGRAM(s) Oral daily  atorvastatin 10 milliGRAM(s) Oral at bedtime  budesonide  80 MICROgram(s)/formoterol 4.5 MICROgram(s) Inhaler 2 Puff(s) Inhalation two times a day  cefepime   IVPB 1000 milliGRAM(s) IV Intermittent every 12 hours  ferrous    sulfate 325 milliGRAM(s) Oral daily  heparin   Injectable 5000 Unit(s) SubCutaneous every 8 hours  levothyroxine 50 MICROGram(s) Oral daily  metoprolol tartrate 12.5 milliGRAM(s) Oral two times a day  pantoprazole   Suspension 40 milliGRAM(s) Oral daily  tiotropium 2.5 MICROgram(s) Inhaler 2 Puff(s) Inhalation daily    MEDICATIONS  (PRN):  albuterol    90 MICROgram(s) HFA Inhaler 2 Puff(s) Inhalation every 6 hours PRN Shortness of Breath and/or Wheezing            REVIEW OF SYSTEMS:  Constitutional: [ ] fever, [ ]weight loss,  [ ]fatigue [ ]weight gain  Eyes: [ ] visual changes  Respiratory: [ ]shortness of breath;  [ ] cough, [ ]wheezing, [ ]chills, [ ]hemoptysis  Cardiovascular: [ ] chest pain, [ ]palpitations, [ ]dizziness,  [ ]leg swelling[ ]orthopnea[ ]PND  Gastrointestinal: [ ] abdominal pain, [ ]nausea, [ ]vomiting,  [ ]diarrhea [ ]Constipation [ ]Melena  Genitourinary: [ ] dysuria, [ ] hematuria [ ]Ram  Neurologic: [ ] headaches [ ] tremors[ ]weakness [ ]Paralysis Right[ ] Left[ ]  Skin: [ ] itching, [ ]burning, [ ] rashes  Endocrine: [ ] heat or cold intolerance  Musculoskeletal: [ ] joint pain or swelling; [ ] muscle, back, or extremity pain  Psychiatric: [ ] depression, [ ]anxiety, [ ]mood swings, or [ ]difficulty sleeping  Hematologic: [ ] easy bruising, [ ] bleeding gums    [ ] All remaining systems negative except as per above.   [ ]Unable to obtain.  [x] No change in ROS since admission      Vital Signs Last 24 Hrs  T(C): 36.8 (09 Jun 2023 12:30), Max: 37.3 (08 Jun 2023 20:34)  T(F): 98.2 (09 Jun 2023 12:30), Max: 99.1 (08 Jun 2023 20:34)  HR: 102 (09 Jun 2023 17:24) (84 - 102)  BP: 116/64 (09 Jun 2023 17:24) (116/64 - 131/62)  BP(mean): 83 (09 Jun 2023 12:30) (83 - 83)  RR: 16 (09 Jun 2023 17:24) (16 - 18)  SpO2: 93% (09 Jun 2023 12:30) (92% - 94%)    Parameters below as of 09 Jun 2023 12:30  Patient On (Oxygen Delivery Method): room air      I&O's Summary    08 Jun 2023 07:01  -  09 Jun 2023 07:00  --------------------------------------------------------  IN: 0 mL / OUT: 600 mL / NET: -600 mL    09 Jun 2023 07:01  -  09 Jun 2023 20:21  --------------------------------------------------------  IN: 0 mL / OUT: 700 mL / NET: -700 mL        PHYSICAL EXAM:  General: No acute distress BMI-  HEENT: EOMI, PERRL  Neck: Supple, [ ] JVD  Lungs: Equal air entry bilaterally; [ ] rales [ ] wheezing [ ] rhonchi  Heart: Regular rate and rhythm; [x ] murmur   2/6 [ x] systolic [ ] diastolic [ ] radiation[ ] rubs [ ]  gallops  Abdomen: Nontender, bowel sounds present  Extremities: No clubbing, cyanosis, [ ] edema [ ]Pulses  equal and intact  Nervous system:  Alert & Oriented X3, no focal deficits  Psychiatric: Normal affect  Skin: No rashes or lesions    LABS:  06-09    139  |  105  |  12  ----------------------------<  140<H>  4.8   |  32<H>  |  0.44<L>    Ca    9.2      09 Jun 2023 11:30      Creatinine Trend: 0.44<--, 0.28<--, 0.27<--, 0.20<--, 0.26<--, 0.31<--                        11.1   17.02 )-----------( 230      ( 09 Jun 2023 11:30 )             34.5

## 2023-06-09 NOTE — PROGRESS NOTE ADULT - SUBJECTIVE AND OBJECTIVE BOX
Patient is a 82y old  Female who presents with a chief complaint of Hyperkalemia, Acute/Chronic renal failure (09 Jun 2023 13:12)      INTERVAL HPI/OVERNIGHT EVENTS: no acute events overnight     REVIEW OF SYSTEMS:  CONSTITUTIONAL: No fever, chills  ENMT:  No difficulty hearing, no change in vision  NECK: No pain or stiffness  RESPIRATORY: No cough, SOB  CARDIOVASCULAR: No chest pain, palpitations  GASTROINTESTINAL: No abdominal pain. No nausea, vomiting, or diarrhea  GENITOURINARY: No dysuria  NEUROLOGICAL: No HA  SKIN: No itching, burning, rashes, or lesions   LYMPH NODES: No enlarged glands  ENDOCRINE: No heat or cold intolerance; No hair loss  MUSCULOSKELETAL: No joint pain or swelling; No muscle, back, or extremity pain  PSYCHIATRIC: No depression, anxiety  HEME/LYMPH: No easy bruising, or bleeding gums    T(C): 36.8 (06-09-23 @ 12:30), Max: 37.3 (06-08-23 @ 20:34)  HR: 96 (06-09-23 @ 12:30) (84 - 96)  BP: 119/74 (06-09-23 @ 12:30) (117/55 - 131/62)  RR: 17 (06-09-23 @ 12:30) (16 - 18)  SpO2: 93% (06-09-23 @ 12:30) (92% - 94%)  Wt(kg): --Vital Signs Last 24 Hrs  T(C): 36.8 (09 Jun 2023 12:30), Max: 37.3 (08 Jun 2023 20:34)  T(F): 98.2 (09 Jun 2023 12:30), Max: 99.1 (08 Jun 2023 20:34)  HR: 96 (09 Jun 2023 12:30) (84 - 96)  BP: 119/74 (09 Jun 2023 12:30) (117/55 - 131/62)  BP(mean): 83 (09 Jun 2023 12:30) (83 - 83)  RR: 17 (09 Jun 2023 12:30) (16 - 18)  SpO2: 93% (09 Jun 2023 12:30) (92% - 94%)    Parameters below as of 09 Jun 2023 12:30  Patient On (Oxygen Delivery Method): room air    MEDICATIONS  (STANDING):  amLODIPine   Tablet 5 milliGRAM(s) Oral daily  atorvastatin 10 milliGRAM(s) Oral at bedtime  budesonide  80 MICROgram(s)/formoterol 4.5 MICROgram(s) Inhaler 2 Puff(s) Inhalation two times a day  cefepime   IVPB 1000 milliGRAM(s) IV Intermittent every 12 hours  ferrous    sulfate 325 milliGRAM(s) Oral daily  heparin   Injectable 5000 Unit(s) SubCutaneous every 8 hours  levothyroxine 50 MICROGram(s) Oral daily  metoprolol tartrate 12.5 milliGRAM(s) Oral two times a day  pantoprazole   Suspension 40 milliGRAM(s) Oral daily  tiotropium 2.5 MICROgram(s) Inhaler 2 Puff(s) Inhalation daily    MEDICATIONS  (PRN):  albuterol    90 MICROgram(s) HFA Inhaler 2 Puff(s) Inhalation every 6 hours PRN Shortness of Breath and/or Wheezing      PHYSICAL EXAM:  GENERAL: NAD  EYES: clear conjunctiva; EOMI  ENMT: Moist mucous membranes  NECK: Supple, No JVD, Normal thyroid  CHEST/LUNG: Clear to auscultation bilaterally; No rales, rhonchi, wheezing, or rubs  HEART: S1, S2, Regular rate and rhythm  ABDOMEN: Soft, Nontender, Nondistended; Bowel sounds present  NEURO: Alert & Oriented X3  EXTREMITIES: No LE edema, no calf tenderness  LYMPH: No lymphadenopathy noted  SKIN: No rashes or lesion  : Ram with clear yellow urine     Consultant(s) Notes Reviewed:  [x ] YES  [ ] NO  Care Discussed with Consultants/Other Providers [ x] YES  [ ] NO    LABS:                        11.1   17.02 )-----------( 230      ( 09 Jun 2023 11:30 )             34.5     06-09    139  |  105  |  12  ----------------------------<  140<H>  4.8   |  32<H>  |  0.44<L>    Ca    9.2      09 Jun 2023 11:30        CAPILLARY BLOOD GLUCOSE    RADIOLOGY & ADDITIONAL TESTS:    Imaging Personally Reviewed:  [x ] YES  [ ] NO  < from: US Renal (06.07.23 @ 10:32) >    ACC: [id number] EXAM:  US KIDNEY(S)   ORDERED BY: EVIN ROWLAND     PROCEDURE DATE:  06/07/2023          INTERPRETATION:  CLINICAL INFORMATION: Postacute kidney injury.    COMPARISON: None available.    TECHNIQUE: Sonography of the kidneys and bladder.    FINDINGS:  Right kidney: 11.0 cm. No renal mass, hydronephrosis or calculi.    Left kidney: 11.4 cm. No renal mass, hydronephrosis or calculi.    Urinary bladder: Collapsed around a Ram catheter.    IMPRESSION:  Normal renal ultrasound.    ---End of Report ---            AYLEEN SIDDIQUI MD; Attending Radiologist  This document has been electronically signed. Jun 7 2023 10:44AM    < end of copied text >

## 2023-06-10 RX ADMIN — BUDESONIDE AND FORMOTEROL FUMARATE DIHYDRATE 2 PUFF(S): 160; 4.5 AEROSOL RESPIRATORY (INHALATION) at 21:29

## 2023-06-10 RX ADMIN — AMLODIPINE BESYLATE 5 MILLIGRAM(S): 2.5 TABLET ORAL at 05:46

## 2023-06-10 RX ADMIN — Medication 325 MILLIGRAM(S): at 12:01

## 2023-06-10 RX ADMIN — CEFEPIME 100 MILLIGRAM(S): 1 INJECTION, POWDER, FOR SOLUTION INTRAMUSCULAR; INTRAVENOUS at 05:47

## 2023-06-10 RX ADMIN — HEPARIN SODIUM 5000 UNIT(S): 5000 INJECTION INTRAVENOUS; SUBCUTANEOUS at 05:46

## 2023-06-10 RX ADMIN — Medication 50 MICROGRAM(S): at 05:46

## 2023-06-10 RX ADMIN — HEPARIN SODIUM 5000 UNIT(S): 5000 INJECTION INTRAVENOUS; SUBCUTANEOUS at 21:28

## 2023-06-10 RX ADMIN — ATORVASTATIN CALCIUM 10 MILLIGRAM(S): 80 TABLET, FILM COATED ORAL at 21:29

## 2023-06-10 RX ADMIN — CEFEPIME 100 MILLIGRAM(S): 1 INJECTION, POWDER, FOR SOLUTION INTRAMUSCULAR; INTRAVENOUS at 18:19

## 2023-06-10 RX ADMIN — TIOTROPIUM BROMIDE 2 PUFF(S): 18 CAPSULE ORAL; RESPIRATORY (INHALATION) at 12:00

## 2023-06-10 RX ADMIN — Medication 12.5 MILLIGRAM(S): at 05:46

## 2023-06-10 RX ADMIN — BUDESONIDE AND FORMOTEROL FUMARATE DIHYDRATE 2 PUFF(S): 160; 4.5 AEROSOL RESPIRATORY (INHALATION) at 12:00

## 2023-06-10 RX ADMIN — Medication 12.5 MILLIGRAM(S): at 18:16

## 2023-06-10 RX ADMIN — PANTOPRAZOLE SODIUM 40 MILLIGRAM(S): 20 TABLET, DELAYED RELEASE ORAL at 12:01

## 2023-06-10 RX ADMIN — HEPARIN SODIUM 5000 UNIT(S): 5000 INJECTION INTRAVENOUS; SUBCUTANEOUS at 14:54

## 2023-06-10 RX ADMIN — ALBUTEROL 2 PUFF(S): 90 AEROSOL, METERED ORAL at 21:28

## 2023-06-10 NOTE — PROGRESS NOTE ADULT - SUBJECTIVE AND OBJECTIVE BOX
Time of Visit:  Patient seen and examined. pat is sitting in chair comfortable     MEDICATIONS  (STANDING):  amLODIPine   Tablet 5 milliGRAM(s) Oral daily  atorvastatin 10 milliGRAM(s) Oral at bedtime  budesonide  80 MICROgram(s)/formoterol 4.5 MICROgram(s) Inhaler 2 Puff(s) Inhalation two times a day  cefepime   IVPB 1000 milliGRAM(s) IV Intermittent every 12 hours  ferrous    sulfate 325 milliGRAM(s) Oral daily  heparin   Injectable 5000 Unit(s) SubCutaneous every 8 hours  levothyroxine 50 MICROGram(s) Oral daily  metoprolol tartrate 12.5 milliGRAM(s) Oral two times a day  pantoprazole   Suspension 40 milliGRAM(s) Oral daily  tiotropium 2.5 MICROgram(s) Inhaler 2 Puff(s) Inhalation daily      MEDICATIONS  (PRN):  albuterol    90 MICROgram(s) HFA Inhaler 2 Puff(s) Inhalation every 6 hours PRN Shortness of Breath and/or Wheezing       Medications up to date at time of exam.      PHYSICAL EXAMINATION:  Patient has no new complaints.  GENERAL: The patient is a well-developed, well-nourished, in no apparent distress.     Vital Signs Last 24 Hrs  T(C): 37.2 (10 Antolin 2023 13:30), Max: 37.7 (09 Jun 2023 20:41)  T(F): 99 (10 Antolin 2023 13:30), Max: 99.8 (09 Jun 2023 20:41)  HR: 85 (10 Antolin 2023 13:30) (77 - 86)  BP: 105/52 (10 Antolin 2023 13:30) (101/50 - 134/63)  BP(mean): --  RR: 17 (10 Antolin 2023 13:30) (16 - 18)  SpO2: 96% (10 Antolin 2023 13:30) (89% - 97%)    Parameters below as of 10 Antolin 2023 13:30  Patient On (Oxygen Delivery Method): nasal cannula  O2 Flow (L/min): 2     (if applicable)    Chest Tube (if applicable)    HEENT: Head is normocephalic and atraumatic. Extraocular muscles are intact. Mucous membranes are moist.     NECK: Supple, no palpable adenopathy.    LUNGS: Clear to auscultation, no wheezing, rales, or rhonchi.    HEART: Regular rate and rhythm without murmur.    ABDOMEN: Soft, nontender, and nondistended.  No hepatosplenomegaly is noted.    : No painful voiding, no pelvic pain    EXTREMITIES: Without any cyanosis, clubbing, rash, lesions or edema.    NEUROLOGIC: Awake, alert, oriented, grossly intact    SKIN: Warm, dry, good turgor.      LABS:                        11.1   17.02 )-----------( 230      ( 09 Jun 2023 11:30 )             34.5     06-09    139  |  105  |  12  ----------------------------<  140<H>  4.8   |  32<H>  |  0.44<L>    Ca    9.2      09 Jun 2023 11:30                          MICROBIOLOGY: (if applicable)    RADIOLOGY & ADDITIONAL STUDIES:  EKG:   CXR:  ECHO:    IMPRESSION: 82y Female PAST MEDICAL & SURGICAL HISTORY:  Asthma with acute exacerbation, unspecified asthma severity      Essential hypertension      Hypothyroid      GERD (gastroesophageal reflux disease)      Constipation      Fe deficiency anemia      History of arthroplasty of right hip       p/w         Impression: This is an 81 Y/O Female from [facility] . Was sent from NH for AMS and Hypoxia . Had a s/p Right Hip Replacement 2 weeks ago at [facility] . Was in ICU and admitted for Acute Renal failure with Hyperkalemia secondary to Obstructive Uropathy and Acute Hypoxic Respiratory Failure in setting of Acute Metabolic Encephalopthy due to Urine Retention , Uremia and UTI . Has Asthma but no exacerbation at this time.  06-01-23 Negative swab for Covid 19 . Now at Medicine Unit and saturating good room air.      Suggestion:  O2 saturation 96% room air. So far saturating good room air.   Continue PRN Albuterol 90 mcg 2 Puffs Q 6 Hours.   Continue Tiotropium 2 Puffs Daily.   Continue Symbicort 80-4.5 mcg 2 Puffs Twice Daily.     Pulmonary oral hygiene care especially every after oral inhaler used.   DVT/ GI prophylactic. On Heparin 5,000 Units SQ Q8 Hours.

## 2023-06-10 NOTE — PROGRESS NOTE ADULT - ASSESSMENT
83 yo F w/ Hx Asthma, primary HTN, GERD, constipation, Fe def Anemia, Hypothyroidism and recent hip replacement who was sent in from NH for AMS and hypoxia. Ms. Garcia had a RIGHT hip replacement approx 2 weeks ago at [facility] w/ DC to [facility] for sub acute rehab.p/w decreased PO intake.  NH started her on Oxygen 6L due to new hypoxia.  In ED: Patient arrived with BUN Cr 173/7.85 and serum K 7.2 w/ EKG showing Sinus Tachycardia, no T wave changes  CT abd showing large distended bladder w/ b/l hydronephrosis. Given 2L IVF, Insulin 5 and Dextrose. Ram placed with urine studies. Urine appeared w/ bipin pus. Given 1x Cefepime dose.   IN ICU Patient's kidney function and potassium improved. CHIP likely due to post-renal obstruction. Acute encephalopathy likely due to uremia. CTH showed no acute findings. She was also treated for AHRF w/ duonebs. Currently tolerating nasal cannula. Venous Doppler of LE was negative for DVT. Urine Culture was negative. She passed her swallow evaluation and currently back to her baseline mental status.    Downgrade to Medicine 6/6

## 2023-06-10 NOTE — PROGRESS NOTE ADULT - SUBJECTIVE AND OBJECTIVE BOX
PATIENT SEEN AND EXAMINED ON :- 6/10/23  DATE OF SERVICE:  6/10/23           Interim events noted,Labs ,Radiological studies and Cardiology tests reviewed .    MR#[id number]  PATIENT NAME:-LEWIS RODRIGUEZ       [facility] COURSE: HPI:  83 yo F w/ Hx Asthma, primary HTN, GERD, constipation, Fe def Anemia, Hypothyroidism and recent hip replacement who was sent in from NH for AMS and hypoxia. Ms. Rodriguez had a RIGHT hip replacement approx 2 weeks ago at [facility] w/ DC to [facility] for sub acute rehab. Daughter reports that she had began to have diminished PO intake x few days and is not speaking as she usually does. NH started her on Oxygen 6L due to new hypoxia but she does not use O2 at baseline.     In ED: Patient arrived with BUN Cr 173/7.85 and serum K 7.2 w/ EKG showing no peaked T waves. Vitals notable for hypoxia, on 5L NC maintaining 94%.  CT abd showing large distended bladder w/ b/l hydronephrosis. Given 2L IVF, Insulin 5 and Dextrose. Ram placed with urine studies. Urine appeared w/ bipin pus. Given 1x Cefepime dose. 1x Duoneb and ABG for hypoxia. (01 Jun 2023 20:12)      INTERIM EVENTS:Patient seen at bedside ,interim events noted.      PMH -reviewed admission note, no change since admission  HEART FAILURE: Acute[ ]Chronic[ ] Systolic[ ] Diastolic[ ] Combined Systolic and Diastolic[ ]  CAD[ ] CABG[ ] PCI[ ]  DEVICES[ ] PPM[ ] ICD[ ] ILR[ ]  ATRIAL FIBRILLATION[ ] Paroxysmal[ ] Permanent[ ] CHADS2-[  ]  CHIP[ ] CKD1[ ] CKD2[ ] CKD3[ ] CKD4[ ] ESRD[ ]  COPD[ ] HTN[ ]   DM[ ] Type1[ ] Type 2[ ]   CVA[ ] Paresis[ ]    AMBULATION: Assisted[ ] Cane/walker[ ] Independent[ ]    MEDICATIONS  (STANDING):  amLODIPine   Tablet 5 milliGRAM(s) Oral daily  atorvastatin 10 milliGRAM(s) Oral at bedtime  budesonide  80 MICROgram(s)/formoterol 4.5 MICROgram(s) Inhaler 2 Puff(s) Inhalation two times a day  cefepime   IVPB 1000 milliGRAM(s) IV Intermittent every 12 hours  ferrous    sulfate 325 milliGRAM(s) Oral daily  heparin   Injectable 5000 Unit(s) SubCutaneous every 8 hours  levothyroxine 50 MICROGram(s) Oral daily  metoprolol tartrate 12.5 milliGRAM(s) Oral two times a day  pantoprazole   Suspension 40 milliGRAM(s) Oral daily  tiotropium 2.5 MICROgram(s) Inhaler 2 Puff(s) Inhalation daily    MEDICATIONS  (PRN):  albuterol    90 MICROgram(s) HFA Inhaler 2 Puff(s) Inhalation every 6 hours PRN Shortness of Breath and/or Wheezing            REVIEW OF SYSTEMS:  Constitutional: [ ] fever, [ ]weight loss,  [ ]fatigue [ ]weight gain  Eyes: [ ] visual changes  Respiratory: [ ]shortness of breath;  [ ] cough, [ ]wheezing, [ ]chills, [ ]hemoptysis  Cardiovascular: [ ] chest pain, [ ]palpitations, [ ]dizziness,  [ ]leg swelling[ ]orthopnea[ ]PND  Gastrointestinal: [ ] abdominal pain, [ ]nausea, [ ]vomiting,  [ ]diarrhea [ ]Constipation [ ]Melena  Genitourinary: [ ] dysuria, [ ] hematuria [ ]Ram  Neurologic: [ ] headaches [ ] tremors[ ]weakness [ ]Paralysis Right[ ] Left[ ]  Skin: [ ] itching, [ ]burning, [ ] rashes  Endocrine: [ ] heat or cold intolerance  Musculoskeletal: [ ] joint pain or swelling; [ ] muscle, back, or extremity pain  Psychiatric: [ ] depression, [ ]anxiety, [ ]mood swings, or [ ]difficulty sleeping  Hematologic: [ ] easy bruising, [ ] bleeding gums    [ ] All remaining systems negative except as per above.   [ ]Unable to obtain.  [x] No change in ROS since admission      Vital Signs Last 24 Hrs  T(C): 37.2 (10 Antolin 2023 13:30), Max: 37.7 (09 Jun 2023 20:41)  T(F): 99 (10 Antolin 2023 13:30), Max: 99.8 (09 Jun 2023 20:41)  HR: 85 (10 Antolin 2023 13:30) (77 - 86)  BP: 105/52 (10 Antolin 2023 13:30) (101/50 - 134/63)  BP(mean): --  RR: 17 (10 Antolin 2023 13:30) (16 - 18)  SpO2: 96% (10 Antolin 2023 13:30) (89% - 97%)    Parameters below as of 10 Antolin 2023 13:30  Patient On (Oxygen Delivery Method): nasal cannula  O2 Flow (L/min): 2    I&O's Summary    09 Jun 2023 07:01  -  10 Antolin 2023 07:00  --------------------------------------------------------  IN: 0 mL / OUT: 1625 mL / NET: -1625 mL        PHYSICAL EXAM:  General: No acute distress BMI-  HEENT: EOMI, PERRL  Neck: Supple, [ ] JVD  Lungs: Equal air entry bilaterally; [ ] rales [ ] wheezing [ ] rhonchi  Heart: Regular rate and rhythm; [x ] murmur   2/6 [ x] systolic [ ] diastolic [ ] radiation[ ] rubs [ ]  gallops  Abdomen: Nontender, bowel sounds present  Extremities: No clubbing, cyanosis, [ ] edema [ ]Pulses  equal and intact  Nervous system:  Alert & Oriented X3, no focal deficits  Psychiatric: Normal affect  Skin: No rashes or lesions    LABS:  06-09    139  |  105  |  12  ----------------------------<  140<H>  4.8   |  32<H>  |  0.44<L>    Ca    9.2      09 Jun 2023 11:30      Creatinine Trend: 0.44<--, 0.28<--, 0.27<--, 0.20<--, 0.26<--, 0.31<--                        11.1   17.02 )-----------( 230      ( 09 Jun 2023 11:30 )             34.5

## 2023-06-11 RX ORDER — ACETAMINOPHEN 500 MG
650 TABLET ORAL ONCE
Refills: 0 | Status: COMPLETED | OUTPATIENT
Start: 2023-06-11 | End: 2023-06-11

## 2023-06-11 RX ORDER — ACETAMINOPHEN 500 MG
650 TABLET ORAL EVERY 6 HOURS
Refills: 0 | Status: DISCONTINUED | OUTPATIENT
Start: 2023-06-11 | End: 2023-06-12

## 2023-06-11 RX ADMIN — Medication 650 MILLIGRAM(S): at 14:52

## 2023-06-11 RX ADMIN — HEPARIN SODIUM 5000 UNIT(S): 5000 INJECTION INTRAVENOUS; SUBCUTANEOUS at 14:20

## 2023-06-11 RX ADMIN — Medication 650 MILLIGRAM(S): at 15:52

## 2023-06-11 RX ADMIN — BUDESONIDE AND FORMOTEROL FUMARATE DIHYDRATE 2 PUFF(S): 160; 4.5 AEROSOL RESPIRATORY (INHALATION) at 22:12

## 2023-06-11 RX ADMIN — HEPARIN SODIUM 5000 UNIT(S): 5000 INJECTION INTRAVENOUS; SUBCUTANEOUS at 05:55

## 2023-06-11 RX ADMIN — AMLODIPINE BESYLATE 5 MILLIGRAM(S): 2.5 TABLET ORAL at 05:57

## 2023-06-11 RX ADMIN — Medication 325 MILLIGRAM(S): at 11:38

## 2023-06-11 RX ADMIN — PANTOPRAZOLE SODIUM 40 MILLIGRAM(S): 20 TABLET, DELAYED RELEASE ORAL at 11:38

## 2023-06-11 RX ADMIN — CEFEPIME 100 MILLIGRAM(S): 1 INJECTION, POWDER, FOR SOLUTION INTRAMUSCULAR; INTRAVENOUS at 17:46

## 2023-06-11 RX ADMIN — TIOTROPIUM BROMIDE 2 PUFF(S): 18 CAPSULE ORAL; RESPIRATORY (INHALATION) at 11:37

## 2023-06-11 RX ADMIN — Medication 50 MICROGRAM(S): at 05:56

## 2023-06-11 RX ADMIN — ATORVASTATIN CALCIUM 10 MILLIGRAM(S): 80 TABLET, FILM COATED ORAL at 22:11

## 2023-06-11 RX ADMIN — HEPARIN SODIUM 5000 UNIT(S): 5000 INJECTION INTRAVENOUS; SUBCUTANEOUS at 22:13

## 2023-06-11 RX ADMIN — BUDESONIDE AND FORMOTEROL FUMARATE DIHYDRATE 2 PUFF(S): 160; 4.5 AEROSOL RESPIRATORY (INHALATION) at 11:37

## 2023-06-11 NOTE — PROGRESS NOTE ADULT - PROBLEM SELECTOR PLAN 1
in setting of urine retention, uremia, and UTI  - resolved now   - CT head w/out acute finding, unremarkable   - pt is at her baseline now

## 2023-06-11 NOTE — PROGRESS NOTE ADULT - PROBLEM SELECTOR PLAN 4
controlled   - Metop 12.5mg BID, Amlodipine 5mg qd  - Consider restarting Losartan if remains hypertensive

## 2023-06-11 NOTE — PROGRESS NOTE ADULT - ASSESSMENT
81 yo F w/ Hx Asthma, primary HTN, GERD, constipation, Fe def Anemia, Hypothyroidism and recent hip replacement who was sent in from NH for AMS and hypoxia. Ms. Garcia had a RIGHT hip replacement approx 2 weeks ago at [facility] w/ DC to [facility] for sub acute rehab.p/w decreased PO intake.  NH started her on Oxygen 6L due to new hypoxia.  In ED: Patient arrived with BUN Cr 173/7.85 and serum K 7.2 w/ EKG showing Sinus Tachycardia, no T wave changes  CT abd showing large distended bladder w/ b/l hydronephrosis. Given 2L IVF, Insulin 5 and Dextrose. Ram placed with urine studies. Urine appeared w/ bipin pus. Given 1x Cefepime dose.   IN ICU Patient's kidney function and potassium improved. CHIP likely due to post-renal obstruction. Acute encephalopathy likely due to uremia. CTH showed no acute findings. She was also treated for AHRF w/ duonebs. Currently tolerating nasal cannula. Venous Doppler of LE was negative for DVT. Urine Culture was negative. She passed her swallow evaluation and currently back to her baseline mental status.    Downgrade to Medicine 6/6

## 2023-06-11 NOTE — PROGRESS NOTE ADULT - PROBLEM SELECTOR PLAN 5
- saturating well on 3L NC  - cont Duoneb q6h  - B/L LE US negative for DVT  - resolved now

## 2023-06-11 NOTE — PROGRESS NOTE ADULT - SUBJECTIVE AND OBJECTIVE BOX
Time of Visit:  Patient seen and examined.     MEDICATIONS  (STANDING):  amLODIPine   Tablet 5 milliGRAM(s) Oral daily  atorvastatin 10 milliGRAM(s) Oral at bedtime  budesonide  80 MICROgram(s)/formoterol 4.5 MICROgram(s) Inhaler 2 Puff(s) Inhalation two times a day  cefepime   IVPB 1000 milliGRAM(s) IV Intermittent every 12 hours  ferrous    sulfate 325 milliGRAM(s) Oral daily  heparin   Injectable 5000 Unit(s) SubCutaneous every 8 hours  levothyroxine 50 MICROGram(s) Oral daily  metoprolol tartrate 12.5 milliGRAM(s) Oral two times a day  pantoprazole   Suspension 40 milliGRAM(s) Oral daily  tiotropium 2.5 MICROgram(s) Inhaler 2 Puff(s) Inhalation daily      MEDICATIONS  (PRN):  acetaminophen     Tablet .. 650 milliGRAM(s) Oral every 6 hours PRN Mild Pain (1 - 3)  albuterol    90 MICROgram(s) HFA Inhaler 2 Puff(s) Inhalation every 6 hours PRN Shortness of Breath and/or Wheezing       Medications up to date at time of exam.      PHYSICAL EXAMINATION:  Patient has no new complaints.  GENERAL: The patient is a well-developed, well-nourished, in no apparent distress.     Vital Signs Last 24 Hrs  T(C): 36.7 (11 Jun 2023 12:02), Max: 36.9 (10 Antolin 2023 20:05)  T(F): 98.1 (11 Jun 2023 12:02), Max: 98.5 (10 Antolin 2023 20:05)  HR: 99 (11 Jun 2023 17:44) (75 - 99)  BP: 113/60 (11 Jun 2023 17:44) (106/54 - 119/66)  BP(mean): 66 (11 Jun 2023 05:25) (66 - 66)  RR: 17 (11 Jun 2023 12:02) (16 - 18)  SpO2: 96% (11 Jun 2023 15:10) (95% - 97%)    Parameters below as of 11 Jun 2023 15:10      O2 Concentration (%): 2   (if applicable)    Chest Tube (if applicable)    HEENT: Head is normocephalic and atraumatic. Extraocular muscles are intact. Mucous membranes are moist.     NECK: Supple, no palpable adenopathy.    LUNGS: Clear to auscultation, no wheezing, rales, or rhonchi.    HEART: Regular rate and rhythm without murmur.    ABDOMEN: Soft, nontender, and nondistended.  No hepatosplenomegaly is noted.    : No painful voiding, no pelvic pain    EXTREMITIES: Without any cyanosis, clubbing, rash, lesions or edema.    NEUROLOGIC: Awake, alert, oriented, grossly intact    SKIN: Warm, dry, good turgor.      LABS:                              MICROBIOLOGY: (if applicable)    RADIOLOGY & ADDITIONAL STUDIES:  EKG:   CXR:  ECHO:    IMPRESSION: 82y Female PAST MEDICAL & SURGICAL HISTORY:  Asthma with acute exacerbation, unspecified asthma severity      Essential hypertension      Hypothyroid      GERD (gastroesophageal reflux disease)      Constipation      Fe deficiency anemia      History of arthroplasty of right hip       p/w           Impression: This is an 83 Y/O Female from [facility] . Was sent from NH for AMS and Hypoxia . Had a s/p Right Hip Replacement 2 weeks ago at [facility] . Was in ICU and admitted for Acute Renal failure with Hyperkalemia secondary to Obstructive Uropathy and Acute Hypoxic Respiratory Failure in setting of Acute Metabolic Encephalopthy due to Urine Retention , Uremia and UTI . Has Asthma but no exacerbation at this time.  06-01-23 Negative swab for Covid 19 . Now at Medicine Unit and saturating good room air.      Suggestion:  O2 saturation 96% room air. So far saturating good room air.   Continue PRN Albuterol 90 mcg 2 Puffs Q 6 Hours.   Continue Tiotropium 2 Puffs Daily.   Continue Symbicort 80-4.5 mcg 2 Puffs Twice Daily.     Pulmonary oral hygiene care especially every after oral inhaler used.   DVT/ GI prophylactic. On Heparin 5,000 Units SQ Q8 Hours.

## 2023-06-11 NOTE — PROGRESS NOTE ADULT - NUTRITIONAL ASSESSMENT
This patient has been assessed with a concern for Malnutrition and has been determined to have a diagnosis/diagnoses of Severe protein-calorie malnutrition.    This patient is being managed with:   Diet Soft and Bite Sized-  DASH/TLC {Sodium & Cholesterol Restricted}  Supplement Feeding Modality:  Oral  Ensure Compact Cans or Servings Per Day:  1       Frequency:  Two Times a day  Entered: Jun 6 2023  3:03PM  

## 2023-06-11 NOTE — PROGRESS NOTE ADULT - TIME BILLING
- Review of records, telemetry, vital signs and daily labs.   - General and cardiovascular physical examination.  - Generation of cardiovascular treatment plan.  - Coordination of care.      Patient was seen and examined by me on 6/6/23,interim events noted,labs and radiology studies reviewed.  Hernan Salazar MD,FACC.  [address] [place], [place] [id number].  [phone]
- Review of records, telemetry, vital signs and daily labs.   - General and cardiovascular physical examination.  - Generation of cardiovascular treatment plan.  - Coordination of care.      Patient was seen and examined by me on 6/7/23,interim events noted,labs and radiology studies reviewed.  Hernan Salazar MD,FACC.  [address] [place], [place] [id number].  [phone]    Discussed with NP covering
- Review of records, telemetry, vital signs and daily labs.   - General and cardiovascular physical examination.  - Generation of cardiovascular treatment plan.  - Coordination of care.      Patient was seen and examined by me on 6/10/23,interim events noted,labs and radiology studies reviewed.  Hernan Salazar MD,FACC.  [address] [place], [place] [id number].  [phone]
- Review of records, telemetry, vital signs and daily labs.   - General and cardiovascular physical examination.  - Generation of cardiovascular treatment plan.  - Coordination of care.      Patient was seen and examined by me on 6/9/23,interim events noted,labs and radiology studies reviewed.  Hernan Salazar MD,FACC.  [address] [place], [place] [id number].  [phone]    Discussed with NP covering
- Review of records, telemetry, vital signs and daily labs.   - General and cardiovascular physical examination.  - Generation of cardiovascular treatment plan.  - Coordination of care.      Patient was seen and examined by me on 6/11/23,interim events noted,labs and radiology studies reviewed.  Hernan Salazar MD,FACC.  [address] [place], [place] [id number].  [phone]

## 2023-06-11 NOTE — PROGRESS NOTE ADULT - PROBLEM SELECTOR PROBLEM 2
Obstructed, uropathy

## 2023-06-11 NOTE — PROGRESS NOTE ADULT - SUBJECTIVE AND OBJECTIVE BOX
PATIENT SEEN AND EXAMINED ON :- 6/11/23  DATE OF SERVICE: 6/11/23            Interim events noted,Labs ,Radiological studies and Cardiology tests reviewed .    MR#[id number]  PATIENT NAME:-LEWIS RODRIGUEZ           [facility] [place] COU[facility]: HPI:  83 yo F w/ Hx Asthma, primary HTN, GERD, constipation, Fe def Anemia, Hypothyroidism and recent hip replacement who was sent in from NH for AMS and hypoxia. Ms. Rodriguez had a RIGHT hip replacement approx 2 weeks ago at [facility] w/ DC to [facility] for sub acute rehab. Daughter reports that she had began to have diminished PO intake x few days and is not speaking as she usually does. NH started her on Oxygen 6L due to new hypoxia but she does not use O2 at baseline.     In ED: Patient arrived with BUN Cr 173/7.85 and serum K 7.2 w/ EKG showing no peaked T waves. Vitals notable for hypoxia, on 5L NC maintaining 94%.  CT abd showing large distended bladder w/ b/l hydronephrosis. Given 2L IVF, Insulin 5 and Dextrose. Ram placed with urine studies. Urine appeared w/ bipin pus. Given 1x Cefepime dose. 1x Duoneb and ABG for hypoxia. (01 Jun 2023 20:12)      INTERIM EVENTS:Patient seen at bedside ,interim events noted.      PMH -reviewed admission note, no change since admission  HEART FAILURE: Acute[ ]Chronic[ ] Systolic[ ] Diastolic[ ] Combined Systolic and Diastolic[ ]  CAD[ ] CABG[ ] PCI[ ]  DEVICES[ ] PPM[ ] ICD[ ] ILR[ ]  ATRIAL FIBRILLATION[ ] Paroxysmal[ ] Permanent[ ] CHADS2-[  ]  CHIP[ ] CKD1[ ] CKD2[ ] CKD3[ ] CKD4[ ] ESRD[ ]  COPD[ ] HTN[ ]   DM[ ] Type1[ ] Type 2[ ]   CVA[ ] Paresis[ ]    AMBULATION: Assisted[ ] Cane/walker[ ] Independent[ ]    MEDICATIONS  (STANDING):  amLODIPine   Tablet 5 milliGRAM(s) Oral daily  atorvastatin 10 milliGRAM(s) Oral at bedtime  budesonide  80 MICROgram(s)/formoterol 4.5 MICROgram(s) Inhaler 2 Puff(s) Inhalation two times a day  cefepime   IVPB 1000 milliGRAM(s) IV Intermittent every 12 hours  ferrous    sulfate 325 milliGRAM(s) Oral daily  heparin   Injectable 5000 Unit(s) SubCutaneous every 8 hours  levothyroxine 50 MICROGram(s) Oral daily  metoprolol tartrate 12.5 milliGRAM(s) Oral two times a day  pantoprazole   Suspension 40 milliGRAM(s) Oral daily  tiotropium 2.5 MICROgram(s) Inhaler 2 Puff(s) Inhalation daily    MEDICATIONS  (PRN):  acetaminophen     Tablet .. 650 milliGRAM(s) Oral every 6 hours PRN Mild Pain (1 - 3)  albuterol    90 MICROgram(s) HFA Inhaler 2 Puff(s) Inhalation every 6 hours PRN Shortness of Breath and/or Wheezing            REVIEW OF SYSTEMS:  Constitutional: [ ] fever, [ ]weight loss,  [ ]fatigue [ ]weight gain  Eyes: [ ] visual changes  Respiratory: [ ]shortness of breath;  [ ] cough, [ ]wheezing, [ ]chills, [ ]hemoptysis  Cardiovascular: [ ] chest pain, [ ]palpitations, [ ]dizziness,  [ ]leg swelling[ ]orthopnea[ ]PND  Gastrointestinal: [ ] abdominal pain, [ ]nausea, [ ]vomiting,  [ ]diarrhea [ ]Constipation [ ]Melena  Genitourinary: [ ] dysuria, [ ] hematuria [ ]Ram  Neurologic: [ ] headaches [ ] tremors[ ]weakness [ ]Paralysis Right[ ] Left[ ]  Skin: [ ] itching, [ ]burning, [ ] rashes  Endocrine: [ ] heat or cold intolerance  Musculoskeletal: [ ] joint pain or swelling; [ ] muscle, back, or extremity pain  Psychiatric: [ ] depression, [ ]anxiety, [ ]mood swings, or [ ]difficulty sleeping  Hematologic: [ ] easy bruising, [ ] bleeding gums    [ ] All remaining systems negative except as per above.   [ ]Unable to obtain.  [x] No change in ROS since admission      Vital Signs Last 24 Hrs  T(C): 36.7 (11 Jun 2023 12:02), Max: 36.9 (10 Antolin 2023 20:05)  T(F): 98.1 (11 Jun 2023 12:02), Max: 98.5 (10 Antolin 2023 20:05)  HR: 99 (11 Jun 2023 17:44) (75 - 99)  BP: 113/60 (11 Jun 2023 17:44) (106/54 - 119/66)  BP(mean): 66 (11 Jun 2023 05:25) (66 - 66)  RR: 17 (11 Jun 2023 12:02) (16 - 18)  SpO2: 96% (11 Jun 2023 15:10) (95% - 97%)    Parameters below as of 11 Jun 2023 15:10      O2 Concentration (%): 2  I&O's Summary    10 Antolin 2023 07:01  -  11 Jun 2023 07:00  --------------------------------------------------------  IN: 0 mL / OUT: 1000 mL / NET: -1000 mL    11 Jun 2023 07:01  -  11 Jun 2023 19:23  --------------------------------------------------------  IN: 0 mL / OUT: 500 mL / NET: -500 mL        PHYSICAL EXAM:  General: No acute distress BMI-  HEENT: EOMI, PERRL  Neck: Supple, [ ] JVD  Lungs: Equal air entry bilaterally; [ ] rales [ ] wheezing [ ] rhonchi  Heart: Regular rate and rhythm; [x ] murmur   2/6 [ x] systolic [ ] diastolic [ ] radiation[ ] rubs [ ]  gallops  Abdomen: Nontender, bowel sounds present  Extremities: No clubbing, cyanosis, [ ] edema [ ]Pulses  equal and intact  Nervous system:  Alert & Oriented X3, no focal deficits  Psychiatric: Normal affect  Skin: No rashes or lesions    LABS:        Creatinine Trend: 0.44<--, 0.28<--, 0.27<--, 0.20<--, 0.26<--, 0.31<--

## 2023-06-11 NOTE — PROGRESS NOTE ADULT - PROBLEM SELECTOR PLAN 6
On albuterol at home  - cont Duoneb likely will switch ot MDI in am

## 2023-06-11 NOTE — PROGRESS NOTE ADULT - PROBLEM SELECTOR PLAN 2
- CT showing obstructive urinary retention and b/l hydronephrosis  - benavides reinserted, 6/8, failed TOV overnight   - Dr. Kent
- CT showing obstructive urinary retention and b/l hydronephrosis  - continue benavides   - Dr. Kent
- CT showing obstructive urinary retention and b/l hydronephrosis  - continue benavides   - Dr. Kent

## 2023-06-11 NOTE — PROGRESS NOTE ADULT - PROBLEM SELECTOR PLAN 7
on home dose PO synthroid 50 mcg qd

## 2023-06-12 ENCOUNTER — TRANSCRIPTION ENCOUNTER (OUTPATIENT)
Age: 83
End: 2023-06-12

## 2023-06-12 VITALS
OXYGEN SATURATION: 95 % | RESPIRATION RATE: 18 BRPM | TEMPERATURE: 99 F | HEART RATE: 81 BPM | SYSTOLIC BLOOD PRESSURE: 110 MMHG | DIASTOLIC BLOOD PRESSURE: 57 MMHG

## 2023-06-12 LAB
ALBUMIN SERPL ELPH-MCNC: 2.6 G/DL — LOW (ref 3.5–5)
ALP SERPL-CCNC: 97 U/L — SIGNIFICANT CHANGE UP (ref 40–120)
ALT FLD-CCNC: 26 U/L DA — SIGNIFICANT CHANGE UP (ref 10–60)
ANION GAP SERPL CALC-SCNC: 4 MMOL/L — LOW (ref 5–17)
AST SERPL-CCNC: 21 U/L — SIGNIFICANT CHANGE UP (ref 10–40)
BILIRUB SERPL-MCNC: 0.4 MG/DL — SIGNIFICANT CHANGE UP (ref 0.2–1.2)
BUN SERPL-MCNC: 14 MG/DL — SIGNIFICANT CHANGE UP (ref 7–18)
CALCIUM SERPL-MCNC: 9 MG/DL — SIGNIFICANT CHANGE UP (ref 8.4–10.5)
CHLORIDE SERPL-SCNC: 107 MMOL/L — SIGNIFICANT CHANGE UP (ref 96–108)
CO2 SERPL-SCNC: 30 MMOL/L — SIGNIFICANT CHANGE UP (ref 22–31)
CREAT SERPL-MCNC: 0.42 MG/DL — LOW (ref 0.5–1.3)
EGFR: 98 ML/MIN/1.73M2 — SIGNIFICANT CHANGE UP
GLUCOSE SERPL-MCNC: 98 MG/DL — SIGNIFICANT CHANGE UP (ref 70–99)
HCT VFR BLD CALC: 31.6 % — LOW (ref 34.5–45)
HGB BLD-MCNC: 9.8 G/DL — LOW (ref 11.5–15.5)
MAGNESIUM SERPL-MCNC: 2.1 MG/DL — SIGNIFICANT CHANGE UP (ref 1.6–2.6)
MCHC RBC-ENTMCNC: 31 GM/DL — LOW (ref 32–36)
MCHC RBC-ENTMCNC: 32.8 PG — SIGNIFICANT CHANGE UP (ref 27–34)
MCV RBC AUTO: 105.7 FL — HIGH (ref 80–100)
NRBC # BLD: 0 /100 WBCS — SIGNIFICANT CHANGE UP (ref 0–0)
PHOSPHATE SERPL-MCNC: 3.1 MG/DL — SIGNIFICANT CHANGE UP (ref 2.5–4.5)
PLATELET # BLD AUTO: 253 K/UL — SIGNIFICANT CHANGE UP (ref 150–400)
POTASSIUM SERPL-MCNC: 5.1 MMOL/L — SIGNIFICANT CHANGE UP (ref 3.5–5.3)
POTASSIUM SERPL-SCNC: 5.1 MMOL/L — SIGNIFICANT CHANGE UP (ref 3.5–5.3)
PROT SERPL-MCNC: 6.8 G/DL — SIGNIFICANT CHANGE UP (ref 6–8.3)
RBC # BLD: 2.99 M/UL — LOW (ref 3.8–5.2)
RBC # FLD: 13 % — SIGNIFICANT CHANGE UP (ref 10.3–14.5)
SARS-COV-2 RNA SPEC QL NAA+PROBE: SIGNIFICANT CHANGE UP
SODIUM SERPL-SCNC: 141 MMOL/L — SIGNIFICANT CHANGE UP (ref 135–145)
WBC # BLD: 7.35 K/UL — SIGNIFICANT CHANGE UP (ref 3.8–10.5)
WBC # FLD AUTO: 7.35 K/UL — SIGNIFICANT CHANGE UP (ref 3.8–10.5)

## 2023-06-12 PROCEDURE — 85025 COMPLETE CBC W/AUTO DIFF WBC: CPT

## 2023-06-12 PROCEDURE — 83735 ASSAY OF MAGNESIUM: CPT

## 2023-06-12 PROCEDURE — 87635 SARS-COV-2 COVID-19 AMP PRB: CPT

## 2023-06-12 PROCEDURE — 87040 BLOOD CULTURE FOR BACTERIA: CPT

## 2023-06-12 PROCEDURE — 83935 ASSAY OF URINE OSMOLALITY: CPT

## 2023-06-12 PROCEDURE — 84480 ASSAY TRIIODOTHYRONINE (T3): CPT

## 2023-06-12 PROCEDURE — 84132 ASSAY OF SERUM POTASSIUM: CPT

## 2023-06-12 PROCEDURE — 84133 ASSAY OF URINE POTASSIUM: CPT

## 2023-06-12 PROCEDURE — 84540 ASSAY OF URINE/UREA-N: CPT

## 2023-06-12 PROCEDURE — 93041 RHYTHM ECG TRACING: CPT

## 2023-06-12 PROCEDURE — 94640 AIRWAY INHALATION TREATMENT: CPT

## 2023-06-12 PROCEDURE — 97530 THERAPEUTIC ACTIVITIES: CPT

## 2023-06-12 PROCEDURE — 82803 BLOOD GASES ANY COMBINATION: CPT

## 2023-06-12 PROCEDURE — 70450 CT HEAD/BRAIN W/O DYE: CPT | Mod: MA

## 2023-06-12 PROCEDURE — 85610 PROTHROMBIN TIME: CPT

## 2023-06-12 PROCEDURE — 84156 ASSAY OF PROTEIN URINE: CPT

## 2023-06-12 PROCEDURE — 0225U NFCT DS DNA&RNA 21 SARSCOV2: CPT

## 2023-06-12 PROCEDURE — 71250 CT THORAX DX C-: CPT | Mod: MA

## 2023-06-12 PROCEDURE — 80053 COMPREHEN METABOLIC PANEL: CPT

## 2023-06-12 PROCEDURE — 85027 COMPLETE CBC AUTOMATED: CPT

## 2023-06-12 PROCEDURE — 84300 ASSAY OF URINE SODIUM: CPT

## 2023-06-12 PROCEDURE — 80048 BASIC METABOLIC PNL TOTAL CA: CPT

## 2023-06-12 PROCEDURE — 82570 ASSAY OF URINE CREATININE: CPT

## 2023-06-12 PROCEDURE — 87086 URINE CULTURE/COLONY COUNT: CPT

## 2023-06-12 PROCEDURE — 82550 ASSAY OF CK (CPK): CPT

## 2023-06-12 PROCEDURE — 87640 STAPH A DNA AMP PROBE: CPT

## 2023-06-12 PROCEDURE — 84100 ASSAY OF PHOSPHORUS: CPT

## 2023-06-12 PROCEDURE — 85379 FIBRIN DEGRADATION QUANT: CPT

## 2023-06-12 PROCEDURE — 93005 ELECTROCARDIOGRAM TRACING: CPT

## 2023-06-12 PROCEDURE — 82330 ASSAY OF CALCIUM: CPT

## 2023-06-12 PROCEDURE — 71045 X-RAY EXAM CHEST 1 VIEW: CPT

## 2023-06-12 PROCEDURE — 84295 ASSAY OF SERUM SODIUM: CPT

## 2023-06-12 PROCEDURE — 87641 MR-STAPH DNA AMP PROBE: CPT

## 2023-06-12 PROCEDURE — 92610 EVALUATE SWALLOWING FUNCTION: CPT

## 2023-06-12 PROCEDURE — 93971 EXTREMITY STUDY: CPT

## 2023-06-12 PROCEDURE — 81001 URINALYSIS AUTO W/SCOPE: CPT

## 2023-06-12 PROCEDURE — 99285 EMERGENCY DEPT VISIT HI MDM: CPT

## 2023-06-12 PROCEDURE — 84436 ASSAY OF TOTAL THYROXINE: CPT

## 2023-06-12 PROCEDURE — 76775 US EXAM ABDO BACK WALL LIM: CPT

## 2023-06-12 PROCEDURE — 97116 GAIT TRAINING THERAPY: CPT

## 2023-06-12 PROCEDURE — 83605 ASSAY OF LACTIC ACID: CPT

## 2023-06-12 PROCEDURE — 85730 THROMBOPLASTIN TIME PARTIAL: CPT

## 2023-06-12 PROCEDURE — 84443 ASSAY THYROID STIM HORMONE: CPT

## 2023-06-12 PROCEDURE — 82962 GLUCOSE BLOOD TEST: CPT

## 2023-06-12 PROCEDURE — 36415 COLL VENOUS BLD VENIPUNCTURE: CPT

## 2023-06-12 PROCEDURE — 74176 CT ABD & PELVIS W/O CONTRAST: CPT | Mod: MA

## 2023-06-12 RX ORDER — ACETAMINOPHEN 500 MG
2 TABLET ORAL
Qty: 0 | Refills: 0 | DISCHARGE
Start: 2023-06-12

## 2023-06-12 RX ORDER — TIOTROPIUM BROMIDE 18 UG/1
2 CAPSULE ORAL; RESPIRATORY (INHALATION)
Qty: 0 | Refills: 0 | DISCHARGE
Start: 2023-06-12

## 2023-06-12 RX ORDER — BUDESONIDE AND FORMOTEROL FUMARATE DIHYDRATE 160; 4.5 UG/1; UG/1
2 AEROSOL RESPIRATORY (INHALATION)
Qty: 0 | Refills: 0 | DISCHARGE
Start: 2023-06-12

## 2023-06-12 RX ORDER — ATORVASTATIN CALCIUM 80 MG/1
1 TABLET, FILM COATED ORAL
Qty: 0 | Refills: 0 | DISCHARGE
Start: 2023-06-12

## 2023-06-12 RX ADMIN — PANTOPRAZOLE SODIUM 40 MILLIGRAM(S): 20 TABLET, DELAYED RELEASE ORAL at 12:19

## 2023-06-12 RX ADMIN — Medication 325 MILLIGRAM(S): at 12:19

## 2023-06-12 RX ADMIN — Medication 50 MICROGRAM(S): at 05:41

## 2023-06-12 RX ADMIN — HEPARIN SODIUM 5000 UNIT(S): 5000 INJECTION INTRAVENOUS; SUBCUTANEOUS at 14:15

## 2023-06-12 RX ADMIN — Medication 12.5 MILLIGRAM(S): at 05:39

## 2023-06-12 RX ADMIN — HEPARIN SODIUM 5000 UNIT(S): 5000 INJECTION INTRAVENOUS; SUBCUTANEOUS at 05:39

## 2023-06-12 RX ADMIN — BUDESONIDE AND FORMOTEROL FUMARATE DIHYDRATE 2 PUFF(S): 160; 4.5 AEROSOL RESPIRATORY (INHALATION) at 12:17

## 2023-06-12 RX ADMIN — TIOTROPIUM BROMIDE 2 PUFF(S): 18 CAPSULE ORAL; RESPIRATORY (INHALATION) at 12:18

## 2023-06-12 RX ADMIN — AMLODIPINE BESYLATE 5 MILLIGRAM(S): 2.5 TABLET ORAL at 05:39

## 2023-06-12 NOTE — PROGRESS NOTE ADULT - PROVIDER SPECIALTY LIST ADULT
Critical Care
Critical Care
Infectious Disease
Infectious Disease
Pulmonology
Pulmonology
Cardiology
Internal Medicine
Critical Care
Nephrology
Nephrology
Pulmonology
Critical Care
Critical Care
Infectious Disease
Nephrology
Nephrology
Internal Medicine
Internal Medicine
Cardiology

## 2023-06-12 NOTE — PROGRESS NOTE ADULT - SUBJECTIVE AND OBJECTIVE BOX
Time of Visit:  Patient seen and examined.     MEDICATIONS  (STANDING):  amLODIPine   Tablet 5 milliGRAM(s) Oral daily  atorvastatin 10 milliGRAM(s) Oral at bedtime  budesonide  80 MICROgram(s)/formoterol 4.5 MICROgram(s) Inhaler 2 Puff(s) Inhalation two times a day  ferrous    sulfate 325 milliGRAM(s) Oral daily  heparin   Injectable 5000 Unit(s) SubCutaneous every 8 hours  levothyroxine 50 MICROGram(s) Oral daily  metoprolol tartrate 12.5 milliGRAM(s) Oral two times a day  pantoprazole   Suspension 40 milliGRAM(s) Oral daily  tiotropium 2.5 MICROgram(s) Inhaler 2 Puff(s) Inhalation daily      MEDICATIONS  (PRN):  acetaminophen     Tablet .. 650 milliGRAM(s) Oral every 6 hours PRN Mild Pain (1 - 3)  albuterol    90 MICROgram(s) HFA Inhaler 2 Puff(s) Inhalation every 6 hours PRN Shortness of Breath and/or Wheezing       Medications up to date at time of exam.    ROS: No fever, chills, nasal congestion ,cough on exam  .   PHYSICAL EXAMINATION:    Vital Signs Last 24 Hrs  T(C): 37.2 (12 Jun 2023 12:55), Max: 37.2 (12 Jun 2023 12:55)  T(F): 98.9 (12 Jun 2023 12:55), Max: 98.9 (12 Jun 2023 12:55)  HR: 81 (12 Jun 2023 12:55) (77 - 99)  BP: 110/57 (12 Jun 2023 12:55) (109/50 - 124/72)  BP(mean): 74 (12 Jun 2023 12:55) (64 - 74)  RR: 18 (12 Jun 2023 12:55) (16 - 18)  SpO2: 95% (12 Jun 2023 12:55) (95% - 97%)    Parameters below as of 12 Jun 2023 12:55  Patient On (Oxygen Delivery Method): nasal cannula  O2 Flow (L/min): 2     General : Alert and oriented. Able to answer question at rest with no SOB . No acute distress .     HEENT: Head is normocephalic and atraumatic. No nasal tenderness. Extraocular muscles are intact. Mucous membranes are moist.     NECK: Supple, no palpable adenopathy.    LUNGS: Non labored. No wheezing . No use of accessory muscle.     HEART: S1 S2 Regular rate and no click / rub.     ABDOMEN: Soft, nontender, and nondistended.  Active bowel sounds.     ; No bladder distention and tenderness .     NEUROLOGIC: Awake, alert, oriented.     SKIN: Warm and moist . Non diaphoretic.       LABS:                        9.8    7.35  )-----------( 253      ( 12 Jun 2023 06:35 )             31.6     06-12    141  |  107  |  14  ----------------------------<  98  5.1   |  30  |  0.42<L>    Ca    9.0      12 Jun 2023 06:35  Phos  3.1     06-12  Mg     2.1     06-12    TPro  6.8  /  Alb  2.6<L>  /  TBili  0.4  /  DBili  x   /  AST  21  /  ALT  26  /  AlkPhos  97  06-12                        MICROBIOLOGY: (if applicable)    RADIOLOGY & ADDITIONAL STUDIES:  EKG:   CXR:  ECHO:    IMPRESSION: 82y Female PAST MEDICAL & SURGICAL HISTORY:  Asthma with acute exacerbation, unspecified asthma severity      Essential hypertension      Hypothyroid      GERD (gastroesophageal reflux disease)      Constipation      Fe deficiency anemia      History of arthroplasty of right hip       Impression: This is an 81 Y/O Female from [facility] . Was sent from NH for AMS and Hypoxia . Had a s/p Right Hip Replacement 2 weeks ago at [facility] . Was in ICU and admitted for Acute Renal failure with Hyperkalemia secondary to Obstructive Uropathy and Acute Hypoxic Respiratory Failure in setting of Acute Metabolic Encephalopthy due to Urine Retention , Uremia and UTI . Has Asthma but no exacerbation at this time. 06-12-23, 06-09-23,  06-01-23 Negative swab for Covid 19 . Now in Medicine Unit .      Suggestion:  O2 saturation 96% room air. Monitor O2 saturation trend room air , using Oxygen supplementation on and off but saturating good room air on exam.    Continue PRN Albuterol 90 mcg 2 Puffs Q 6 Hours.   Continue Tiotropium 2 Puffs Daily.   Continue Symbicort 80-4.5 mcg 2 Puffs Twice Daily.     Pulmonary oral hygiene care especially every after oral inhaler used.   DVT/ GI prophylactic. On Heparin 5,000 Units SQ Q8 Hours.

## 2023-06-12 NOTE — DISCHARGE NOTE NURSING/CASE MANAGEMENT/SOCIAL WORK - PATIENT PORTAL LINK FT
You can access the FollowMyHealth Patient Portal offered by [facility] by registering at the following website: [email or web address]. By joining [facility]’s FollowMyHealth portal, you will also be able to view your health information using other applications (apps) compatible with our system.

## 2023-06-12 NOTE — PROGRESS NOTE ADULT - REASON FOR ADMISSION
Hyperkalemia, Acute/Chronic renal failure
no
Hyperkalemia, Acute/Chronic renal failure

## 2023-06-12 NOTE — DISCHARGE NOTE NURSING/CASE MANAGEMENT/SOCIAL WORK - [email or web address]_GEN_ALL_CORE
For information on Fall & Injury Prevention, visit: [email or web address] OR  [email or web address] OR  https://www.cdc.gov/steadi/patient.html

## 2023-10-27 NOTE — DIETITIAN INITIAL EVALUATION ADULT - NSFNSGIIOFT_GEN_A_CORE
DISPLAY PLAN FREE TEXT
Ht=?  Wt data in EMR: 139 lb 6/1/23; 118.6 lb 6/2/23; 125.8 lb 6/3/23, changes may due to scale/fluid variance

## 2023-11-24 NOTE — PHYSICAL THERAPY INITIAL EVALUATION ADULT - PERTINENT HX OF CURRENT PROBLEM, REHAB EVAL
independent
Patient was brought to ED from [facility] with AMS and hypoxia. Patient s/p R THR on May 24th. Requested Ortho PA for an adb. wedge as patient seen with only knee immobilizer on the R LE.

## 2024-07-31 NOTE — ED PROVIDER NOTE - BREATH SOUNDS
[Nutrition/ Exercise/ Weight Management] : nutrition, exercise, weight management [Vitamins/Supplements] : vitamins/supplements [Preconception Care/ Fertility options] : preconception care, fertility options [FreeTextEntry2] : Patient counseled on IUD removal risks vs. benefits, procedure and side effects. Patient desires midwifery care as she hopes to have a natural birth with minimal interventions. Patient oriented to midwifery practice model. All questions and concerns were addressed.  mild expiratory wheezing/WHEEZES

## 2024-12-15 NOTE — PHYSICAL THERAPY INITIAL EVALUATION ADULT - PATIENT/FAMILY AGREES WITH PLAN
Anemia is likely due to chronic disease due to Chronic liver disease. Most recent hemoglobin and hematocrit are listed below.  Recent Labs     12/14/24  0517 12/14/24  1616 12/15/24  0520   HGB 6.8* 7.9* 7.3*   HCT 21.2* 24.2* 22.8*       Plan  - Monitor serial CBC: Daily  - Transfuse PRBC if patient becomes hemodynamically unstable, symptomatic or H/H drops below 7/21.  - Patient has received 1 units of PRBCs on 11/20, 11/21, 12/15  - Patient's anemia is currently stable  - Hb baseline 7-8. No obvious bleeding  - Eliquis held on admission.  DVT prophylaxis held.   VICK/yes

## 2025-04-07 NOTE — PROGRESS NOTE ADULT - CRITICAL CARE SERVICES PROVIDED
Patient is critically ill, requiring critical care services.
07-Apr-2025 21:55